# Patient Record
Sex: FEMALE | Race: BLACK OR AFRICAN AMERICAN | Employment: UNEMPLOYED | URBAN - METROPOLITAN AREA
[De-identification: names, ages, dates, MRNs, and addresses within clinical notes are randomized per-mention and may not be internally consistent; named-entity substitution may affect disease eponyms.]

---

## 2018-05-18 ENCOUNTER — APPOINTMENT (OUTPATIENT)
Dept: GENERAL RADIOLOGY | Age: 58
End: 2018-05-18
Attending: PHYSICIAN ASSISTANT
Payer: SELF-PAY

## 2018-05-18 ENCOUNTER — HOSPITAL ENCOUNTER (EMERGENCY)
Age: 58
Discharge: HOME | End: 2018-05-18
Attending: EMERGENCY MEDICINE | Admitting: EMERGENCY MEDICINE
Payer: SELF-PAY

## 2018-05-18 VITALS
SYSTOLIC BLOOD PRESSURE: 156 MMHG | HEART RATE: 87 BPM | TEMPERATURE: 98.2 F | OXYGEN SATURATION: 96 % | RESPIRATION RATE: 16 BRPM | BODY MASS INDEX: 26.21 KG/M2 | DIASTOLIC BLOOD PRESSURE: 92 MMHG | HEIGHT: 59 IN | WEIGHT: 130 LBS

## 2018-05-18 DIAGNOSIS — S82.832A OTHER CLOSED FRACTURE OF DISTAL END OF LEFT FIBULA, INITIAL ENCOUNTER: Primary | ICD-10-CM

## 2018-05-18 PROCEDURE — 99282 EMERGENCY DEPT VISIT SF MDM: CPT

## 2018-05-18 PROCEDURE — 73610 X-RAY EXAM OF ANKLE: CPT

## 2018-05-18 PROCEDURE — 75810000053 HC SPLINT APPLICATION

## 2018-05-18 RX ORDER — HYDROCODONE BITARTRATE AND ACETAMINOPHEN 5; 325 MG/1; MG/1
1 TABLET ORAL
Qty: 10 TAB | Refills: 0 | OUTPATIENT
Start: 2018-05-18 | End: 2022-04-29

## 2018-05-18 RX ORDER — IBUPROFEN 600 MG/1
600 TABLET ORAL
Qty: 20 TAB | Refills: 0 | OUTPATIENT
Start: 2018-05-18 | End: 2022-04-29

## 2018-05-18 NOTE — ED NOTES
Short leg cast applied and pt return demonstrate use of crutches. Pt accepted DC data and med's. Patient (s)  given copy of dc instructions and 2 script(s). Patient (s)  verbalized understanding of instructions and script (s). Patient given a current medication reconciliation form and verbalized understanding of their medications. Patient (s) verbalized understanding of the importance of discussing medications with  his or her physician or clinic they will be following up with. Patient alert and oriented and in no acute distress. Patient discharged home ambulatory with self.

## 2018-05-18 NOTE — ED NOTES
Patient alert and oriented x4. Patient reports left ankle pain x2 days after slipping on a wet piece of cardboard. Denies hitting head and denies LOC. Patient denies any other complaints at this time. Emergency Department Nursing Plan of Care       The Nursing Plan of Care is developed from the Nursing assessment and Emergency Department Attending provider initial evaluation. The plan of care may be reviewed in the ED Provider note.     The Plan of Care was developed with the following considerations:   Patient / Family readiness to learn indicated by:verbalized understanding  Persons(s) to be included in education: patient  Barriers to Learning/Limitations:No    [id number] Domingo Toribio RN    5/18/2018   2:01 PM

## 2018-05-18 NOTE — ED PROVIDER NOTES
EMERGENCY DEPARTMENT HISTORY AND PHYSICAL EXAM    Date: 5/18/2018  Patient Name: Scott Rodriguez    History of Presenting Illness     Chief Complaint   Patient presents with    Ankle Pain     left x 2 days after slipping on wet board         History Provided By: Patient    HPI: Scott Rodriguez is a 65 y.o. female with No significant past medical history who presents with L ankle pain x 2 days. Pt states while walking home she slipped on a wet board and twisted ankle. Pt states \"I think it's sprained\"  Pt states she took one of her mother's \"prescription tylenol\" pills PTA. Pt rates pain 7/10. Pt has been using crutches at home    PCP: No primary care provider on file. Current Outpatient Prescriptions   Medication Sig Dispense Refill    HYDROcodone-acetaminophen (NORCO) 5-325 mg per tablet Take 1 Tab by mouth every eight (8) hours as needed for Pain. Max Daily Amount: 3 Tabs. 10 Tab 0    ibuprofen (MOTRIN) 600 mg tablet Take 1 Tab by mouth every six (6) hours as needed for Pain. 20 Tab 0       Past History     Past Medical History:  History reviewed. No pertinent past medical history. Past Surgical History:  History reviewed. No pertinent surgical history. Family History:  History reviewed. No pertinent family history. Social History:  Social History   Substance Use Topics    Smoking status: Current Every Day Smoker     Packs/day: 1.00    Smokeless tobacco: None    Alcohol use Yes       Allergies:  No Known Allergies      Review of Systems   Review of Systems   Musculoskeletal: Positive for arthralgias, gait problem and joint swelling. Neurological: Negative for speech difficulty and weakness. Psychiatric/Behavioral: Positive for self-injury. All other systems reviewed and are negative.       Physical Exam     Vitals:    05/18/18 1328   BP: (!) 156/92   Pulse: 87   Resp: 16   Temp: 98.2 °F (36.8 °C)   SpO2: 96%   Weight: 59 kg (130 lb)   Height: 4' 11\" (1.499 m)     Physical Exam Constitutional: She is oriented to person, place, and time. She appears well-developed and well-nourished. No distress. HENT:   Head: Normocephalic and atraumatic. Eyes: Conjunctivae are normal.   Cardiovascular: Normal rate, regular rhythm and normal heart sounds. Pulmonary/Chest: Effort normal and breath sounds normal. No respiratory distress. She has no wheezes. She has no rales. Musculoskeletal:        Left ankle: She exhibits decreased range of motion and swelling. She exhibits no ecchymosis, no deformity, no laceration and normal pulse. Tenderness. Lateral malleolus and medial malleolus tenderness found. Achilles tendon normal.   Neurological: She is alert and oriented to person, place, and time. Skin: Skin is warm and dry. Psychiatric: She has a normal mood and affect. Her behavior is normal. Judgment and thought content normal.   Nursing note and vitals reviewed. at 3:20 PM    Diagnostic Study Results     Labs -   No results found for this or any previous visit (from the past 12 hour(s)). Radiologic Studies -   XR ANKLE LT MIN 3 V   Final Result        CT Results  (Last 48 hours)    None        CXR Results  (Last 48 hours)    None        Study Result      EXAM:  XR ANKLE LT MIN 3 V     INDICATION:  Fall 2 days ago. Left ankle pain     COMPARISON: None.     FINDINGS: Three views of the left ankle demonstrate a spiral fracture of the  distal fibula. There is soft tissue swelling.       IMPRESSION  IMPRESSION: Spiral fracture distal fibula. Medical Decision Making   I am the first provider for this patient. I reviewed the vital signs, available nursing notes, past medical history, past surgical history, family history and social history. Vital Signs-Reviewed the patient's vital signs. ED Course:   Definitive fracture care:  2:48 PM  Splint applied, +NVI, pt already has crutches , ortho follow up given.     Disposition:  Discharged    DISCHARGE NOTE:   3:20 PM      Care plan outlined and precautions discussed. Patient has no new complaints, changes, or physical findings. Results of xray were reviewed with the patient. All medications were reviewed with the patient; will d/c home. All of pt's questions and concerns were addressed. Patient was instructed and agrees to follow up with ortho, as well as to return to the ED upon further deterioration. Patient is ready to go home. Follow-up Information     Follow up With Details Comments [address] [place] Schedule an appointment as soon as possible for a visit today podiatrist- for f/u of fracture Meka Srinivasan [facility] [address]  [phone]            Current Discharge Medication List      START taking these medications    Details   HYDROcodone-acetaminophen (Gonsales Raul) 5-325 mg per tablet Take 1 Tab by mouth every eight (8) hours as needed for Pain. Max Daily Amount: 3 Tabs. Qty: 10 Tab, Refills: 0    Associated Diagnoses: Other closed fracture of distal end of left fibula, initial encounter      ibuprofen (MOTRIN) 600 mg tablet Take 1 Tab by mouth every six (6) hours as needed for Pain. Qty: 20 Tab, Refills: 0             Provider Notes (Medical Decision Making):   DDX: strain, sprain, fracture    Procedures:  APPLY SHORT LEG SPLINT  Date/Time: 5/18/2018 3:19 PM  Performed by: Юлия Arteaga  Authorized by: Юлия Arteaga     Consent:     Consent obtained:  Verbal    Consent given by:  Parent    Risks discussed:  Pain and swelling  Pre-procedure details:     Sensation:  Normal  Procedure details:     Laterality:  Left    Location:  Ankle    Ankle:  L ankle    Cast type:  Short leg    Splint type:  Short leg    Supplies:  Elastic bandage, cotton padding and Ortho-Glass  Post-procedure details:     Pain:  Unchanged    Sensation:  Normal    Patient tolerance of procedure:   Tolerated well, no immediate complications  Comments:      Placed by PA student and tech        Diagnosis     Clinical Impression:   1.  Other closed fracture of distal end of left fibula, initial encounter

## 2018-05-18 NOTE — DISCHARGE INSTRUCTIONS
Learning About RICE (Rest, Ice, Compression, and Elevation)  What is RICE? RICE is a way to care for an injury. RICE helps relieve pain and swelling. It may also help with healing and flexibility. RICE stands for:  · Rest and protect the injured or sore area. · Ice or a cold pack used as soon as possible. · Compression, or wrapping the injured or sore area with an elastic bandage. · Elevation (propping up) the injured or sore area. How do you do RICE? You can use RICE for home treatment when you have general aches and pains or after an injury or surgery. Rest  · Do not put weight on the injury for at least 24 to 48 hours. · Use crutches for a badly sprained knee or ankle. · Support a sprained wrist, elbow, or shoulder with a sling. Ice  · Put ice or a cold pack on the injury right away to reduce pain and swelling. Frozen vegetables will also work as an ice pack. Put a thin cloth between the ice or cold pack and your skin. The cloth protects the injured area from getting too cold. · Use ice for 10 to 15 minutes at a time for the first 48 to 72 hours. Compression  · Use compression for sprains, strains, and surgeries of the arms and legs. · Wrap the injured area with an elastic bandage or compression sleeve to reduce swelling. · Don't wrap it too tightly. If the area below it feels numb, tingles, or feels cool, loosen the wrap. Elevation  · Use elevation for areas of the body that can be propped up, such as arms and legs. · Prop up the injured area on pillows whenever you use ice. Keep it propped up anytime you sit or lie down. · Try to keep the injured area at or above the level of your heart. This will help reduce swelling and bruising. Where can you learn more? Go to http://cezar-jourdan.info/. Enter B758 in the search box to learn more about \"Learning About RICE (Rest, Ice, Compression, and Elevation). \"  Current as of: March 21, 2017  Content Version: 11.4  © 4201-0150 Healthwise, Incorporated. Care instructions adapted under license by [employer] (which disclaims liability or warranty for this information). If you have questions about a medical condition or this instruction, always ask your healthcare professional. [address] any warranty or liability for your use of this information. Broken Ankle: Care Instructions  Your Care Instructions    An ankle may break (fracture) during sports, a fall, or other accidents. Fractures can range from a small, hairline crack, to a bone or bones broken into two or more pieces. Your treatment depends on how bad the break is. Your doctor may have put your ankle in a splint or cast to allow it to heal or to keep it stable until you see another doctor. It may take weeks or months for your ankle to heal. You can help your ankle heal with some care at home. You heal best when you take good care of yourself. Eat a variety of healthy foods, and don't smoke. You may have had a sedative to help you relax. You may be unsteady after having sedation. It can take a few hours for the medicine's effects to wear off. Common side effects of sedation include nausea, vomiting, and feeling sleepy or tired. The doctor has checked you carefully, but problems can develop later. If you notice any problems or new symptoms,  get medical treatment right away. Follow-up care is a key part of your treatment and safety. Be sure to make and go to all appointments, and call your doctor if you are having problems. It's also a good idea to know your test results and keep a list of the medicines you take. How can you care for yourself at home? · If the doctor gave you a sedative:  ¨ For 24 hours, don't do anything that requires attention to detail. It takes time for the medicine's effects to completely wear off. ¨ For your safety, do not drive or operate any machinery that could be dangerous.  Wait until the medicine wears off and you can think clearly and react easily. · Put ice or a cold pack on your ankle for 10 to 20 minutes at a time. Try to do this every 1 to 2 hours for the next 3 days (when you are awake). Put a thin cloth between the ice and your cast or splint. Keep your cast or splint dry. · Follow the cast care instructions your doctor gives you. If you have a splint, do not take it off unless your doctor tells you to. · Be safe with medicines. Take pain medicines exactly as directed. ¨ If the doctor gave you a prescription medicine for pain, take it as prescribed. ¨ If you are not taking a prescription pain medicine, ask your doctor if you can take an over-the-counter medicine. · Prop up your leg on pillows in the first few days after the injury. Keep the ankle higher than the level of your heart. This will help reduce swelling. · Do not put weight on your ankle unless your doctor tells you to. Use crutches to walk. · Follow instructions for exercises to keep your leg strong. · Wiggle your toes often to reduce swelling and stiffness. When should you call for help? Call 911 anytime you think you may need emergency care. For example, call if:  ? · You have chest pain, are short of breath, or you cough up blood. ? · You are very sleepy and you have trouble waking up. ?Call your doctor now or seek immediate medical care if:  ? · You have new or worse nausea or vomiting. ? · You have new or worse pain. ? · Your foot is cool or pale or changes color. ? · You have tingling, weakness, or numbness in your toes. ? · Your cast or splint feels too tight. ? · You have signs of a blood clot in your leg (called a deep vein thrombosis), such as:  ¨ Pain in your calf, back of the knee, thigh, or groin. ¨ Redness or swelling in your leg. ? Watch closely for changes in your health, and be sure to contact your doctor if:  ? · You have a problem with your splint or cast.   ? · You do not get better as expected.    Where can you learn more? Go to http://cezar-jourdan.info/. Enter P763 in the search box to learn more about \"Broken Ankle: Care Instructions. \"  Current as of: March 21, 2017  Content Version: 11.4  © 8800-5472 Healthwise, Incorporated. Care instructions adapted under license by [employer] (which disclaims liability or warranty for this information). If you have questions about a medical condition or this instruction, always ask your healthcare professional. [place] [address] any warranty or liability for your use of this information.

## 2021-10-28 ENCOUNTER — HOSPITAL ENCOUNTER (EMERGENCY)
Age: 61
Discharge: HOME | End: 2021-10-28
Attending: EMERGENCY MEDICINE
Payer: MEDICAID

## 2021-10-28 ENCOUNTER — APPOINTMENT (OUTPATIENT)
Dept: GENERAL RADIOLOGY | Age: 61
End: 2021-10-28
Attending: EMERGENCY MEDICINE
Payer: MEDICAID

## 2021-10-28 VITALS
OXYGEN SATURATION: 98 % | SYSTOLIC BLOOD PRESSURE: 156 MMHG | HEART RATE: 80 BPM | RESPIRATION RATE: 16 BRPM | BODY MASS INDEX: 25.8 KG/M2 | WEIGHT: 128 LBS | TEMPERATURE: 98.1 F | DIASTOLIC BLOOD PRESSURE: 96 MMHG | HEIGHT: 59 IN

## 2021-10-28 DIAGNOSIS — Z72.0 TOBACCO USE: ICD-10-CM

## 2021-10-28 DIAGNOSIS — I10 ASYMPTOMATIC HYPERTENSION: Primary | ICD-10-CM

## 2021-10-28 DIAGNOSIS — R05.9 COUGH: ICD-10-CM

## 2021-10-28 PROCEDURE — 99284 EMERGENCY DEPT VISIT MOD MDM: CPT

## 2021-10-28 PROCEDURE — 93005 ELECTROCARDIOGRAM TRACING: CPT

## 2021-10-28 PROCEDURE — 71045 X-RAY EXAM CHEST 1 VIEW: CPT

## 2021-10-28 NOTE — ED PROVIDER NOTES
EMERGENCY DEPARTMENT HISTORY AND PHYSICAL EXAM      Date: 10/28/2021  Patient Name: Abbey Landa    History of Presenting Illness     Chief Complaint   Patient presents with    Hypertension    Cough       History Provided By: Patient    HPI: Abbey Landa, 62 y.o. female presents to the ED with cc of hypertension. 11year-old female with history of tobacco use presents emergency department with a chief complaint of high blood pressure. Patient went to donate plasma and was told her blood pressure was high in the 613L systolic. She denies any history of hypertension. She has not seen a PCP as she \"stays away from doctors. \". Patient presents emergency department due to concern for high blood pressure. Patient denies chest pain, shortness of breath, numbness or weakness, headache, dizziness, abdominal pain, vomiting or diarrhea. Patient does report a dry nonproductive cough x1 month. There are no other complaints, changes, or physical findings at this time. PCP: Pk, MD Yeyo    No current facility-administered medications on file prior to encounter. Current Outpatient Medications on File Prior to Encounter   Medication Sig Dispense Refill    HYDROcodone-acetaminophen (NORCO) 5-325 mg per tablet Take 1 Tab by mouth every eight (8) hours as needed for Pain. Max Daily Amount: 3 Tabs. (Patient not taking: Reported on 10/28/2021) 10 Tab 0    ibuprofen (MOTRIN) 600 mg tablet Take 1 Tab by mouth every six (6) hours as needed for Pain. (Patient not taking: Reported on 10/28/2021) 20 Tab 0       Past History     Past Medical History:  History reviewed. No pertinent past medical history. Past Surgical History:  History reviewed. No pertinent surgical history. Family History:  History reviewed. No pertinent family history.     Social History:  Social History     Tobacco Use    Smoking status: Current Every Day Smoker     Packs/day: 1.00    Smokeless tobacco: Never Used   Substance Use Topics  Alcohol use: Yes    Drug use: Yes     Types: Cocaine     Comment: last use 10/26/2021       Allergies:  No Known Allergies      Review of Systems   Review of Systems   Constitutional: Negative for activity change, chills and fever. HENT: Negative for facial swelling and voice change. Eyes: Negative for redness. Respiratory: Positive for cough. Negative for shortness of breath and wheezing. Cardiovascular: Negative for chest pain and leg swelling. Gastrointestinal: Negative for abdominal pain, diarrhea, nausea and vomiting. Genitourinary: Negative for decreased urine volume. Musculoskeletal: Negative for gait problem. Skin: Negative for pallor and rash. Neurological: Negative for tremors and facial asymmetry. Psychiatric/Behavioral: Negative for agitation. All other systems reviewed and are negative. Physical Exam   Physical Exam  Vitals and nursing note reviewed. Constitutional:       Comments: 11year-old female, sitting on stretcher, no acute distress   HENT:      Head: Normocephalic and atraumatic. Cardiovascular:      Rate and Rhythm: Normal rate and regular rhythm. Heart sounds: No murmur heard. No friction rub. No gallop. Pulmonary:      Effort: Pulmonary effort is normal.      Breath sounds: Normal breath sounds. No wheezing, rhonchi or rales. Comments: Not hypoxic on room air, no respiratory distress  Abdominal:      Palpations: Abdomen is soft. Tenderness: There is no abdominal tenderness. Musculoskeletal:         General: No swelling. Normal range of motion. Cervical back: Normal range of motion. Skin:     General: Skin is warm. Capillary Refill: Capillary refill takes less than 2 seconds. Neurological:      General: No focal deficit present. Mental Status: She is alert.    Psychiatric:         Mood and Affect: Mood normal.         Diagnostic Study Results     Labs -     Recent Results (from the past 12 hour(s))   EKG, 12 LEAD, INITIAL    Collection Time: 10/28/21 10:48 AM   Result Value Ref Range    Ventricular Rate 79 BPM    Atrial Rate 79 BPM    P-R Interval 148 ms    QRS Duration 74 ms    Q-T Interval 386 ms    QTC Calculation (Bezet) 442 ms    Calculated P Axis 46 degrees    Calculated R Axis 5 degrees    Calculated T Axis 52 degrees    Diagnosis       Normal sinus rhythm  Possible Left atrial enlargement  Borderline ECG  No previous ECGs available         Radiologic Studies -   XR CHEST PORT   Final Result   No acute process. CT Results  (Last 48 hours)    None        CXR Results  (Last 48 hours)               10/28/21 1041  XR CHEST PORT Final result    Impression:  No acute process. Narrative: Indication: Cough       Comparison: None       Portable exam of the chest obtained at 1036 demonstrates normal heart size. There is no acute process in the lung fields. The osseous structures are   unremarkable. Medical Decision Making   I am the first provider for this patient. I reviewed the vital signs, available nursing notes, past medical history, past surgical history, family history and social history. Vital Signs-Reviewed the patient's vital signs. Patient Vitals for the past 12 hrs:   Temp Pulse Resp BP SpO2   10/28/21 1110 -- -- -- (!) 147/90 97 %   10/28/21 1100 -- -- -- (!) 167/87 97 %   10/28/21 1037 -- -- -- (!) 126/102 --   10/28/21 1025 98.2 °F (36.8 °C) 88 16 (!) 155/101 97 %     Records Reviewed: Nursing Notes and Old Medical Records    Provider Notes (Medical Decision Making):     81-year-old female presents emergency department chief complaint of elevated blood pressure. Vital signs stable, not hypoxic. Blood pressure is mildly elevated here but not at a level to make me suspect hypertensive urgency or emergency. Suspect asymptomatic hypertension. Check EKG. Guarding patient's cough, likely smoker's cough, check plain film to exclude pneumothorax, pneumonia.   No wheezing on exam.  Doubt PE. Discussed with patient, likely asymptomatic hypertension, recommend lifestyle trial such as quitting smoking. Recommend PCP follow-up to discuss initiation of antihypertensives. ED Course:   Initial assessment performed. The patients presenting problems have been discussed, and they are in agreement with the care plan formulated and outlined with them. I have encouraged them to ask questions as they arise throughout their visit. ED Course as of Oct 28 1119   Thu Oct 28, 2021   1048 Preliminary EKG interpreted by me. Shows normal sinus rhythm with a HR of 79. No ST elevations or depressions concerning for ischemia. Normal intervals. [MB]   1104 Chest x-ray is unremarkable. [MB]   1651 Patient reassessed, resting in bed, no distress. Discussed PCP follow-up and return precautions. [MB]      ED Course User Index  [MB] Racheal Suarez MD         PROCEDURES      The patient was counseled on the dangers of tobacco use, and was advised to quit. Reviewed strategies to maximize success, including substitution of other forms of reinforcement. 3 minutes spent. Radha Dillon MD      Disposition:    Discharged    DISCHARGE PLAN:  1. Current Discharge Medication List        2. Follow-up Information     Follow up With Specialties Details Why Contact Info    [facility] EMERGENCY DEPT Emergency Medicine  If symptoms worsen [address]    [facility] [address] [place]  In 1 week  [facility] [address] [facility]  In 1 week  [address]  [address] [facility]  [phone]        3. Return to ED if worse     Diagnosis     Clinical Impression:   1. Asymptomatic hypertension    2. Cough    3. Tobacco use        Attestations:    Radha Dillon MD    Please note that this dictation was completed with [email or web address], the [employer] voice recognition software.   Quite often unanticipated grammatical, syntax, homophones, and other interpretive errors are inadvertently transcribed by the computer software. Please disregard these errors. Please excuse any errors that have escaped final proofreading. Thank you.

## 2021-10-28 NOTE — ED NOTES
Pt presents to ED with c/o high blood pressure x 1 week. Pt reports she was trying to donate blood but was unable to do so because they told her that her BP was too high. Per pt, she does not have a PMH of HTN nor is she on medication. Pt also reporting a chronic, dry cough but patient is a daily smoker. Additionally, pt reports occasional crack/cocaine use with her last use being 10/26/2021. RR even and unlabored, skin is warm and dry. Resting on stretcher in NAD. A/O x 4. Call bell within reach. Emergency Department Nursing Plan of Care       The Nursing Plan of Care is developed from the Nursing assessment and Emergency Department Attending provider initial evaluation. The plan of care may be reviewed in the ED Provider note.     The Plan of Care was developed with the following considerations:   Patient / Family readiness to learn indicated by:verbalized understanding  Persons(s) to be included in education: patient  Barriers to Learning/Limitations:No    Signed     Roxanne Corbett RN    10/28/2021   10:55 AM

## 2021-10-28 NOTE — ED NOTES
Pt appears stable, VSS, AOx4, calm and cooperative. Pt discharged home by self. Instructions to follow up with PCP by 11/04/2021 given to patient. Pt ambulatory w steady gait.

## 2021-10-28 NOTE — DISCHARGE INSTRUCTIONS
You were seen in the ER for your symptoms. Please try to cut back and quit smoking. Please monitor your blood pressure and follow-up with your primary doctors.

## 2021-10-28 NOTE — ED TRIAGE NOTES
Pt arrives in the ED with complaints of hypertension x few weeks. Pt denies symptoms related to this. Pt states that when she went to donate blood she was told that her pressure was too high. Pt also reports dry cough x 1 month.

## 2021-10-29 LAB
ATRIAL RATE: 79 BPM
CALCULATED P AXIS, ECG09: 46 DEGREES
CALCULATED R AXIS, ECG10: 5 DEGREES
CALCULATED T AXIS, ECG11: 52 DEGREES
DIAGNOSIS, 93000: NORMAL
P-R INTERVAL, ECG05: 148 MS
Q-T INTERVAL, ECG07: 386 MS
QRS DURATION, ECG06: 74 MS
QTC CALCULATION (BEZET), ECG08: 442 MS
VENTRICULAR RATE, ECG03: 79 BPM

## 2022-04-29 ENCOUNTER — APPOINTMENT (OUTPATIENT)
Dept: VASCULAR SURGERY | Age: 62
End: 2022-04-29
Attending: NURSE PRACTITIONER
Payer: MEDICAID

## 2022-04-29 ENCOUNTER — HOSPITAL ENCOUNTER (EMERGENCY)
Age: 62
Discharge: HOME | End: 2022-04-29
Attending: EMERGENCY MEDICINE
Payer: MEDICAID

## 2022-04-29 ENCOUNTER — APPOINTMENT (OUTPATIENT)
Dept: GENERAL RADIOLOGY | Age: 62
End: 2022-04-29
Attending: NURSE PRACTITIONER
Payer: MEDICAID

## 2022-04-29 VITALS
HEIGHT: 59 IN | BODY MASS INDEX: 26.21 KG/M2 | TEMPERATURE: 99.1 F | DIASTOLIC BLOOD PRESSURE: 95 MMHG | RESPIRATION RATE: 19 BRPM | SYSTOLIC BLOOD PRESSURE: 184 MMHG | WEIGHT: 130 LBS | OXYGEN SATURATION: 96 % | HEART RATE: 96 BPM

## 2022-04-29 DIAGNOSIS — R03.0 ELEVATED BLOOD PRESSURE READING: ICD-10-CM

## 2022-04-29 DIAGNOSIS — J42 CHRONIC BRONCHITIS, UNSPECIFIED CHRONIC BRONCHITIS TYPE (HCC): Primary | ICD-10-CM

## 2022-04-29 LAB
ALBUMIN SERPL-MCNC: 3.7 G/DL (ref 3.5–5)
ALBUMIN/GLOB SERPL: 0.9 {RATIO} (ref 1.1–2.2)
ALP SERPL-CCNC: 97 U/L (ref 45–117)
ALT SERPL-CCNC: 21 U/L (ref 12–78)
ANION GAP SERPL CALC-SCNC: 9 MMOL/L (ref 5–15)
AST SERPL-CCNC: 19 U/L (ref 15–37)
BASOPHILS # BLD: 0.1 K/UL (ref 0–0.1)
BASOPHILS NFR BLD: 1 % (ref 0–1)
BILIRUB SERPL-MCNC: 0.1 MG/DL (ref 0.2–1)
BNP SERPL-MCNC: 103 PG/ML (ref 0–125)
BUN SERPL-MCNC: 14 MG/DL (ref 6–20)
BUN/CREAT SERPL: 14 (ref 12–20)
CALCIUM SERPL-MCNC: 9.1 MG/DL (ref 8.5–10.1)
CHLORIDE SERPL-SCNC: 107 MMOL/L (ref 97–108)
CO2 SERPL-SCNC: 25 MMOL/L (ref 21–32)
CREAT SERPL-MCNC: 0.97 MG/DL (ref 0.55–1.02)
DIFFERENTIAL METHOD BLD: ABNORMAL
EOSINOPHIL # BLD: 0.1 K/UL (ref 0–0.4)
EOSINOPHIL NFR BLD: 1 % (ref 0–7)
ERYTHROCYTE [DISTWIDTH] IN BLOOD BY AUTOMATED COUNT: 15.3 % (ref 11.5–14.5)
GLOBULIN SER CALC-MCNC: 4 G/DL (ref 2–4)
GLUCOSE SERPL-MCNC: 102 MG/DL (ref 65–100)
HCT VFR BLD AUTO: 43.6 % (ref 35–47)
HGB BLD-MCNC: 14.4 G/DL (ref 11.5–16)
IMM GRANULOCYTES # BLD AUTO: 0 K/UL (ref 0–0.04)
IMM GRANULOCYTES NFR BLD AUTO: 0 % (ref 0–0.5)
LYMPHOCYTES # BLD: 2.2 K/UL (ref 0.8–3.5)
LYMPHOCYTES NFR BLD: 29 % (ref 12–49)
MCH RBC QN AUTO: 30 PG (ref 26–34)
MCHC RBC AUTO-ENTMCNC: 33 G/DL (ref 30–36.5)
MCV RBC AUTO: 90.8 FL (ref 80–99)
MONOCYTES # BLD: 0.5 K/UL (ref 0–1)
MONOCYTES NFR BLD: 7 % (ref 5–13)
NEUTS SEG # BLD: 4.7 K/UL (ref 1.8–8)
NEUTS SEG NFR BLD: 62 % (ref 32–75)
NRBC # BLD: 0 K/UL (ref 0–0.01)
NRBC BLD-RTO: 0 PER 100 WBC
PLATELET # BLD AUTO: 202 K/UL (ref 150–400)
PMV BLD AUTO: 11.1 FL (ref 8.9–12.9)
POTASSIUM SERPL-SCNC: 4.1 MMOL/L (ref 3.5–5.1)
PROT SERPL-MCNC: 7.7 G/DL (ref 6.4–8.2)
RBC # BLD AUTO: 4.8 M/UL (ref 3.8–5.2)
SODIUM SERPL-SCNC: 141 MMOL/L (ref 136–145)
WBC # BLD AUTO: 7.6 K/UL (ref 3.6–11)

## 2022-04-29 PROCEDURE — 77030025612 HC NEB KT VYRM -A

## 2022-04-29 PROCEDURE — 74011000250 HC RX REV CODE- 250: Performed by: NURSE PRACTITIONER

## 2022-04-29 PROCEDURE — 83880 ASSAY OF NATRIURETIC PEPTIDE: CPT

## 2022-04-29 PROCEDURE — 93970 EXTREMITY STUDY: CPT

## 2022-04-29 PROCEDURE — 85025 COMPLETE CBC W/AUTO DIFF WBC: CPT

## 2022-04-29 PROCEDURE — 74011636637 HC RX REV CODE- 636/637: Performed by: NURSE PRACTITIONER

## 2022-04-29 PROCEDURE — 80053 COMPREHEN METABOLIC PANEL: CPT

## 2022-04-29 PROCEDURE — 71045 X-RAY EXAM CHEST 1 VIEW: CPT

## 2022-04-29 PROCEDURE — 94640 AIRWAY INHALATION TREATMENT: CPT

## 2022-04-29 PROCEDURE — 36415 COLL VENOUS BLD VENIPUNCTURE: CPT

## 2022-04-29 PROCEDURE — 99284 EMERGENCY DEPT VISIT MOD MDM: CPT

## 2022-04-29 RX ORDER — IPRATROPIUM BROMIDE AND ALBUTEROL SULFATE 2.5; .5 MG/3ML; MG/3ML
3 SOLUTION RESPIRATORY (INHALATION)
Status: COMPLETED | OUTPATIENT
Start: 2022-04-29 | End: 2022-04-29

## 2022-04-29 RX ORDER — PREDNISONE 20 MG/1
60 TABLET ORAL DAILY
Qty: 15 TABLET | Refills: 0 | Status: SHIPPED | OUTPATIENT
Start: 2022-04-29 | End: 2022-05-04

## 2022-04-29 RX ORDER — PREDNISONE 20 MG/1
60 TABLET ORAL
Status: COMPLETED | OUTPATIENT
Start: 2022-04-29 | End: 2022-04-29

## 2022-04-29 RX ORDER — AZITHROMYCIN 250 MG/1
TABLET, FILM COATED ORAL
Qty: 6 TABLET | Refills: 0 | Status: SHIPPED | OUTPATIENT
Start: 2022-04-29

## 2022-04-29 RX ORDER — FLUTICASONE PROPIONATE AND SALMETEROL 250; 50 UG/1; UG/1
1 POWDER RESPIRATORY (INHALATION) EVERY 12 HOURS
Qty: 1 EACH | Refills: 0 | Status: SHIPPED | OUTPATIENT
Start: 2022-04-29

## 2022-04-29 RX ORDER — PREDNISONE 20 MG/1
60 TABLET ORAL ONCE
Status: DISCONTINUED | OUTPATIENT
Start: 2022-04-29 | End: 2022-04-29

## 2022-04-29 RX ORDER — ALBUTEROL SULFATE 90 UG/1
2 AEROSOL, METERED RESPIRATORY (INHALATION)
Qty: 18 G | Refills: 0 | Status: SHIPPED | OUTPATIENT
Start: 2022-04-29

## 2022-04-29 RX ADMIN — IPRATROPIUM BROMIDE AND ALBUTEROL SULFATE 3 ML: .5; 2.5 SOLUTION RESPIRATORY (INHALATION) at 15:40

## 2022-04-29 RX ADMIN — PREDNISONE 60 MG: 20 TABLET ORAL at 15:40

## 2022-04-29 NOTE — PROGRESS NOTES
CM opened case for assessment of D/C planning needs, CM reviewed chart. CM reviewed chart. CM completed assessment with pt at bedside. Pt is alert and oriented throughout encounter. CM introduced self/role, verified demographics, and discussed discharge planning. CM spoke with patient in therm of multiple ER visits she states she has a PCP at [facility] but don't like [facility] so she comes here cause she lives in [place]. Discuss follow-up with patient okay to have PCP scheduled.      [address] [facility], [address] [phone]

## 2022-04-29 NOTE — ED NOTES
Pt reports bilat calf pain x 1 year and dry, non productive cough that is worse at night x 5-6 months. Pt is alert and oriented x 4, RR even and unlabored, skin is warm and dry. Assessment completed and pt updated on plan of care. Call bell in reach. Emergency Department Nursing Plan of Care       The Nursing Plan of Care is developed from the Nursing assessment and Emergency Department Attending provider initial evaluation. The plan of care may be reviewed in the ED Provider note.     The Plan of Care was developed with the following considerations:   Patient / Family readiness to learn indicated by:verbalized understanding  Persons(s) to be included in education: patient  Barriers to Learning/Limitations:No    Signed     Bart Tatum RN    4/29/2022   11:45 AM

## 2022-04-29 NOTE — ED PROVIDER NOTES
EMERGENCY DEPARTMENT HISTORY AND PHYSICAL EXAM      Date: 4/29/2022  Patient Name: Osei Salinas    History of Presenting Illness     Chief Complaint   Patient presents with    Cough     non productive cough x 5-6 months, denies fever, chills and body aches    Leg Pain     \"I walked two blocks and my legs are aching like crazy. \"        History Provided By: Patient    Additional History (Context): Osei Salinas is a 62 y.o. female with substance use  who presents with cough and leg pain. Patient states cough present for approximately a year. Patient states symptoms are intermittent and is a dry cough. Associated with shortness of breath and wheezing. Denies fever, chills, chest pain, nasal symptoms, sore throat. Patient states she has been told she had bronchitis or asthma in the past and has an albuterol inhaler. States inhaler helps at times. He also reports bilateral leg pain that aches more after walking 2 blocks. Denies swelling, redness, warmth, bruising to legs. Denies history of DVT, PE, history of immobilizations or surgeries. She reports history of EtOH and substance use. Reports last crack cocaine use 2 days ago. Denies history of heart disease or CHF. PCP: Yeyo Whittington MD    Current Outpatient Medications   Medication Sig Dispense Refill    predniSONE (DELTASONE) 20 mg tablet Take 60 mg by mouth daily for 5 days. With Breakfast 15 Tablet 0    albuterol (PROVENTIL HFA, VENTOLIN HFA, PROAIR HFA) 90 mcg/actuation inhaler Take 2 Puffs by inhalation every four (4) hours as needed for Wheezing. 18 g 0    fluticasone propion-salmeteroL (Advair Diskus) 250-50 mcg/dose diskus inhaler Take 1 Puff by inhalation every twelve (12) hours.  1 Each 0    azithromycin (Zithromax Z-Tejinder) 250 mg tablet Take 2 tabs by mouth once and then repeat daily for 4 days 6 Tablet 0       Past History     Past Medical History:  Past Medical History:   Diagnosis Date    Drug abuse ([facility] [address]     states, \"I do crack every once in a while\"       Past Surgical History:  No past surgical history on file. Family History:  No family history on file. Social History:  Social History     Tobacco Use    Smoking status: Current Every Day Smoker     Packs/day: 1.00    Smokeless tobacco: Never Used   Substance Use Topics    Alcohol use: Yes    Drug use: Yes     Types: Cocaine     Comment: last use 10/26/2021       Allergies:  No Known Allergies      Review of Systems   Review of Systems   Constitutional: Negative for appetite change, chills, fatigue and fever. HENT: Negative for congestion, ear pain, rhinorrhea and sore throat. Eyes: Negative for pain and itching. Respiratory: Positive for cough, shortness of breath and wheezing. Negative for chest tightness. Cardiovascular: Negative for chest pain, palpitations and leg swelling. Gastrointestinal: Negative for abdominal pain, constipation, diarrhea, nausea and vomiting. Genitourinary: Negative for dysuria, frequency and urgency. Musculoskeletal: Positive for arthralgias (leg pain). Negative for back pain, gait problem and joint swelling. Skin: Negative for color change and rash. Neurological: Negative for dizziness, weakness, numbness and headaches. All other systems reviewed and are negative. Physical Exam     Vitals:    04/29/22 1430 04/29/22 1506 04/29/22 1539   BP: (!) 184/95     Pulse: 96     Resp: 19     Temp: 99.1 °F (37.3 °C)     SpO2: 98% 98% 96%   Weight: 59 kg (130 lb)     Height: 4' 11\" (1.499 m)       Physical Exam  Vitals and nursing note reviewed. Constitutional:       General: She is not in acute distress. Appearance: She is well-developed. She is not ill-appearing. HENT:      Head: Normocephalic and atraumatic.       Right Ear: Tympanic membrane and ear canal normal.      Left Ear: Tympanic membrane and ear canal normal.      Nose: Nose normal.      Mouth/Throat:      Mouth: Mucous membranes are moist.      Pharynx: Oropharynx is clear. No oropharyngeal exudate or posterior oropharyngeal erythema. Eyes:      Extraocular Movements: Extraocular movements intact. Conjunctiva/sclera: Conjunctivae normal.      Pupils: Pupils are equal, round, and reactive to light. Cardiovascular:      Rate and Rhythm: Normal rate and regular rhythm. Pulses: Normal pulses. Heart sounds: Normal heart sounds. Pulmonary:      Effort: Pulmonary effort is normal.      Breath sounds: Normal breath sounds. Abdominal:      General: Bowel sounds are normal. There is no distension. Palpations: Abdomen is soft. Tenderness: There is no abdominal tenderness. There is no guarding or rebound. Musculoskeletal:      Cervical back: Normal range of motion and neck supple. Comments:   BLE calf tenderness. No rubor on elevation. Skin texture intact. Pedal pulses 2+ bilaterally. No swelling, erythema or bruising. Skin:     General: Skin is warm and dry. Capillary Refill: Capillary refill takes less than 2 seconds. Neurological:      Mental Status: She is alert and oriented to person, place, and time. Diagnostic Study Results     Labs -     Recent Results (from the past 12 hour(s))   CBC WITH AUTOMATED DIFF    Collection Time: 04/29/22  3:13 PM   Result Value Ref Range    WBC 7.6 3.6 - 11.0 K/uL    RBC 4.80 3.80 - 5.20 M/uL    HGB 14.4 11.5 - 16.0 g/dL    HCT 43.6 35.0 - 47.0 %    MCV 90.8 80.0 - 99.0 FL    MCH 30.0 26.0 - 34.0 PG    MCHC 33.0 30.0 - 36.5 g/dL    RDW 15.3 (H) 11.5 - 14.5 %    PLATELET 829 131 - 765 K/uL    MPV 11.1 8.9 - 12.9 FL    NRBC 0.0 0  WBC    ABSOLUTE NRBC 0.00 0.00 - 0.01 K/uL    NEUTROPHILS 62 32 - 75 %    LYMPHOCYTES 29 12 - 49 %    MONOCYTES 7 5 - 13 %    EOSINOPHILS 1 0 - 7 %    BASOPHILS 1 0 - 1 %    IMMATURE GRANULOCYTES 0 0.0 - 0.5 %    ABS. NEUTROPHILS 4.7 1.8 - 8.0 K/UL    ABS. LYMPHOCYTES 2.2 0.8 - 3.5 K/UL    ABS. MONOCYTES 0.5 0.0 - 1.0 K/UL    ABS.  EOSINOPHILS 0.1 0.0 - 0.4 K/UL ABS. BASOPHILS 0.1 0.0 - 0.1 K/UL    ABS. IMM. GRANS. 0.0 0.00 - 0.04 K/UL    DF AUTOMATED     METABOLIC PANEL, COMPREHENSIVE    Collection Time: 04/29/22  3:13 PM   Result Value Ref Range    Sodium 141 136 - 145 mmol/L    Potassium 4.1 3.5 - 5.1 mmol/L    Chloride 107 97 - 108 mmol/L    CO2 25 21 - 32 mmol/L    Anion gap 9 5 - 15 mmol/L    Glucose 102 (H) 65 - 100 mg/dL    BUN 14 6 - 20 MG/DL    Creatinine 0.97 0.55 - 1.02 MG/DL    BUN/Creatinine ratio 14 12 - 20      GFR est AA >60 >60 ml/min/1.73m2    GFR est non-AA 59 (L) >60 ml/min/1.73m2    Calcium 9.1 8.5 - 10.1 MG/DL    Bilirubin, total 0.1 (L) 0.2 - 1.0 MG/DL    ALT (SGPT) 21 12 - 78 U/L    AST (SGOT) 19 15 - 37 U/L    Alk. phosphatase 97 45 - 117 U/L    Protein, total 7.7 6.4 - 8.2 g/dL    Albumin 3.7 3.5 - 5.0 g/dL    Globulin 4.0 2.0 - 4.0 g/dL    A-G Ratio 0.9 (L) 1.1 - 2.2     NT-PRO BNP    Collection Time: 04/29/22  3:13 PM   Result Value Ref Range    NT pro- 0 - 125 PG/ML       Radiologic Studies -   XR CHEST PORT   Final Result   Mild interstitial coarsening is nonspecific and can be seen in asthma,   bronchitis, atypical or viral pneumonia            DUPLEX LOWER EXT VENOUS BILAT    (Results Pending)     CT Results  (Last 48 hours)    None        CXR Results  (Last 48 hours)               04/29/22 1521  XR CHEST PORT Final result    Impression:  Mild interstitial coarsening is nonspecific and can be seen in asthma,   bronchitis, atypical or viral pneumonia               Narrative:  EXAM: XR CHEST PORT       INDICATION: c/o cough r/o pneumonia       COMPARISON: 10.28.21       FINDINGS: A portable AP radiograph of the chest was obtained at 1512 hours. There is mild new coarsening of interstitial markings but no infiltrate. The   cardiac and mediastinal contours are stable. The bones and soft tissues are   grossly within normal limits. Medical Decision Making   I am the first provider for this patient.     I reviewed the vital signs, available nursing notes, past medical history, past surgical history, family history and social history. Vital Signs-Reviewed the patient's vital signs. Records Reviewed: Nursing Notes and Old Medical Records    Provider Notes (Medical Decision Making):   61 yo F present with cough and leg pain exhibiting clear breath sounds bilaterally but observed persistent non productive cough. Pt has low grade temp on arrival with elevated blood pressure. Pt does not have appropriate follow up with PCP. Due to uncontrolled HTN and chronic cough plan to obtain labs and CXR to r/o PNA, pleural effusion, CHF, pulmonary edema, Asthma, bronchitis, COPD    Pt has bi;lateral calf tenderness upon palpation but not significant on dorsiflexion. Plan to obtain doppler to r/o DVT. Differential diagnosis include PVD, PAD       ED Course:   ED Course as of 04/29/22 1744 Fri Apr 29, 2022   1531 Progress Note:   Pt has persistent cough while in room. Plan to give duoneb and prednisone. CXR shows interstitial marking consistent with asthma, bronchitis or PNA  [NA]   1742 Progress Note:     Informed by vascular tech, Thomas Santiago, that preliminary study negative for DVT. Informed by  jordyn that patient has PCP at [facility] but has not followed up in some  time.  will follow up with patient regarding assisting appointment. Plan to treat with prednisone, albuterol inhaler and azithromycin. In addition, pt will benefit from ICS inhaler due to chronic bronchitis. Patient sx improved while in the ER with nebulizer and prednisone. Advised to start prednisone tomorrow  [NA]      ED Course User Index  [NA] Elaine Lr NP         Disposition:  Discharge   DISCHARGE NOTE:     Pt has been reexamined. Patient has no new complaints, changes, or physical findings. Care plan outlined and precautions discussed. All of pt's questions and concerns were addressed.  Patient was instructed and agrees to follow up with PCP, as well as to return to the ED upon further deterioration. Patient is ready to go home. Follow-up Information     Follow up With Specialties Details Why Contact Info        the  will contact [facility] to set up PCP care for you          Current Discharge Medication List      START taking these medications    Details   predniSONE (DELTASONE) 20 mg tablet Take 60 mg by mouth daily for 5 days. With Breakfast  Qty: 15 Tablet, Refills: 0  Start date: 4/29/2022, End date: 5/4/2022      albuterol (PROVENTIL HFA, VENTOLIN HFA, PROAIR HFA) 90 mcg/actuation inhaler Take 2 Puffs by inhalation every four (4) hours as needed for Wheezing. Qty: 18 g, Refills: 0  Start date: 4/29/2022      fluticasone propion-salmeteroL (Advair Diskus) 250-50 mcg/dose diskus inhaler Take 1 Puff by inhalation every twelve (12) hours. Qty: 1 Each, Refills: 0  Start date: 4/29/2022      azithromycin (Zithromax Z-Tejinder) 250 mg tablet Take 2 tabs by mouth once and then repeat daily for 4 days  Qty: 6 Tablet, Refills: 0  Start date: 4/29/2022         STOP taking these medications       HYDROcodone-acetaminophen (NORCO) 5-325 mg per tablet Comments:   Reason for Stopping:         ibuprofen (MOTRIN) 600 mg tablet Comments:   Reason for Stopping:                   Diagnosis     Clinical Impression:   1.  Chronic bronchitis, unspecified chronic bronchitis type ([address]    2. Elevated blood pressure reading

## 2022-04-29 NOTE — ED NOTES
Patient (s) was given copy of dc instructions and 0 paper script(s) and 3 electronic scripts. Patient (s)  verbalized understanding of instructions and script (s). Patient given a current medication reconciliation form and verbalized understanding of their medications. Patient (s) verbalized understanding of the importance of discussing medications with  his or her physician or clinic they will be following up with. Patient alert and oriented and in no acute distress. Patient offered wheelchair from treatment area to hospital entrance, patient  wheelchair.

## 2022-04-29 NOTE — DISCHARGE INSTRUCTIONS
It was a pleasure taking care of you at [facility] Emergency Department today. We know that when you come to [facility], you are entrusting us with your health, comfort, and safety. Our physicians and nurses honor that trust, and we truly appreciate the opportunity to care for you and your loved ones. We also value our feedback. If you receive a survey about your Emergency Department experience today, please fill it out. We care about our patients' feedback, and we listen to what you have to say. Thank you!

## 2022-05-02 PROCEDURE — 93970 EXTREMITY STUDY: CPT | Performed by: INTERNAL MEDICINE

## 2023-01-21 ENCOUNTER — APPOINTMENT (OUTPATIENT)
Dept: GENERAL RADIOLOGY | Age: 63
End: 2023-01-21
Attending: EMERGENCY MEDICINE
Payer: MEDICAID

## 2023-01-21 ENCOUNTER — HOSPITAL ENCOUNTER (EMERGENCY)
Age: 63
Discharge: HOME | End: 2023-01-21
Attending: EMERGENCY MEDICINE
Payer: MEDICAID

## 2023-01-21 VITALS
HEIGHT: 59 IN | RESPIRATION RATE: 18 BRPM | TEMPERATURE: 98 F | HEART RATE: 114 BPM | DIASTOLIC BLOOD PRESSURE: 77 MMHG | SYSTOLIC BLOOD PRESSURE: 125 MMHG | OXYGEN SATURATION: 96 % | WEIGHT: 134 LBS | BODY MASS INDEX: 27.01 KG/M2

## 2023-01-21 DIAGNOSIS — J20.9 ACUTE BRONCHITIS, UNSPECIFIED ORGANISM: Primary | ICD-10-CM

## 2023-01-21 LAB
FLUAV RNA SPEC QL NAA+PROBE: NOT DETECTED
FLUBV RNA SPEC QL NAA+PROBE: NOT DETECTED
SARS-COV-2, COV2: NOT DETECTED

## 2023-01-21 PROCEDURE — 99283 EMERGENCY DEPT VISIT LOW MDM: CPT

## 2023-01-21 PROCEDURE — 87636 SARSCOV2 & INF A&B AMP PRB: CPT

## 2023-01-21 PROCEDURE — 77030029684 HC NEB SM VOL KT MONA -A

## 2023-01-21 PROCEDURE — 71045 X-RAY EXAM CHEST 1 VIEW: CPT

## 2023-01-21 PROCEDURE — 74011250637 HC RX REV CODE- 250/637: Performed by: EMERGENCY MEDICINE

## 2023-01-21 PROCEDURE — 74011636637 HC RX REV CODE- 636/637: Performed by: EMERGENCY MEDICINE

## 2023-01-21 PROCEDURE — 74011000250 HC RX REV CODE- 250: Performed by: EMERGENCY MEDICINE

## 2023-01-21 PROCEDURE — 94640 AIRWAY INHALATION TREATMENT: CPT

## 2023-01-21 RX ORDER — PREDNISONE 20 MG/1
60 TABLET ORAL DAILY
Qty: 15 TABLET | Refills: 0 | Status: SHIPPED | OUTPATIENT
Start: 2023-01-21 | End: 2023-01-26

## 2023-01-21 RX ORDER — IPRATROPIUM BROMIDE AND ALBUTEROL SULFATE 2.5; .5 MG/3ML; MG/3ML
3 SOLUTION RESPIRATORY (INHALATION)
Status: COMPLETED | OUTPATIENT
Start: 2023-01-21 | End: 2023-01-21

## 2023-01-21 RX ORDER — ALBUTEROL SULFATE 90 UG/1
2 AEROSOL, METERED RESPIRATORY (INHALATION)
Qty: 1 EACH | Refills: 0 | Status: SHIPPED | OUTPATIENT
Start: 2023-01-21 | End: 2023-01-28

## 2023-01-21 RX ORDER — BENZONATATE 100 MG/1
100 CAPSULE ORAL
Qty: 30 CAPSULE | Refills: 0 | Status: SHIPPED | OUTPATIENT
Start: 2023-01-21 | End: 2023-01-28

## 2023-01-21 RX ORDER — PREDNISONE 20 MG/1
60 TABLET ORAL
Status: COMPLETED | OUTPATIENT
Start: 2023-01-21 | End: 2023-01-21

## 2023-01-21 RX ORDER — GUAIFENESIN 100 MG/5ML
200 SOLUTION ORAL
Status: COMPLETED | OUTPATIENT
Start: 2023-01-21 | End: 2023-01-21

## 2023-01-21 RX ADMIN — IPRATROPIUM BROMIDE AND ALBUTEROL SULFATE 3 ML: 2.5; .5 SOLUTION RESPIRATORY (INHALATION) at 11:18

## 2023-01-21 RX ADMIN — GUAIFENESIN 200 MG: 200 SOLUTION ORAL at 10:32

## 2023-01-21 RX ADMIN — PREDNISONE 60 MG: 20 TABLET ORAL at 10:33

## 2023-01-21 NOTE — LETTER
[facility] EMERGENCY DEPT  [address] [address] [phone]    Work/School Note    Date: 1/21/2023    To Whom It May concern:      Josh Levi was seen and treated today in the emergency room by the following provider(s):  Attending Provider: David Floyd MD.      Josh Levi is excused from work/school on 01/21/23. She is clear to return to work/school on 01/22/23.         Sincerely,          Batsheva Cary RN

## 2023-01-21 NOTE — ED PROVIDER NOTES
EMERGENCY DEPARTMENT HISTORY AND PHYSICAL EXAM      Date: 1/21/2023  Patient Name: Rose Gregory    History of Presenting Illness     Chief Complaint   Patient presents with    Cold Symptoms     Patient presents to ED with c/o cold symptom since yesterday       History Provided By: Patient    HPI: Rose Gregory, 65 y.o. female presents to the ED with cc of cough, chest congestion and body aches for the past 2 days. There are no other associated symptoms, patient concerns, or physical findings at this time. I reviewed the vital signs, available nursing notes, past medical history, past surgical history, family history and social history. Vital Signs:  Patient Vitals for the past 12 hrs:   Temp Pulse Resp BP SpO2   01/21/23 1010 98 °F (36.7 °C) (!) 114 18 (!) 108/96 95 %     Vital signs reviewed. Current Medications:  No current facility-administered medications on file prior to encounter. Current Outpatient Medications on File Prior to Encounter   Medication Sig Dispense Refill    albuterol (PROVENTIL HFA, VENTOLIN HFA, PROAIR HFA) 90 mcg/actuation inhaler Take 2 Puffs by inhalation every four (4) hours as needed for Wheezing. 18 g 0    fluticasone propion-salmeteroL (Advair Diskus) 250-50 mcg/dose diskus inhaler Take 1 Puff by inhalation every twelve (12) hours. 1 Each 0    azithromycin (Zithromax Z-Tejinder) 250 mg tablet Take 2 tabs by mouth once and then repeat daily for 4 days (Patient not taking: Reported on 1/21/2023) 6 Tablet 0       Past History     Past Medical History:  Past Medical History:   Diagnosis Date    Asthma     Drug abuse ([facility] [address]     states, \"I do crack every once in a while\"       Past Surgical History:  History reviewed. No pertinent surgical history. Family History:  History reviewed. No pertinent family history.     Social History:  Social History     Tobacco Use    Smoking status: Every Day     Packs/day: 1.00     Types: Cigarettes    Smokeless tobacco: Never   Substance Use Topics    Alcohol use: Yes    Drug use: Yes     Types: Cocaine     Comment: last use 10/26/2021       Allergies:  No Known Allergies      Review of Systems   Review of Systems   Constitutional:  Positive for chills. Negative for fever. HENT:  Positive for congestion. Negative for sore throat. Eyes:  Negative for photophobia and redness. Respiratory:  Positive for cough and shortness of breath. Negative for wheezing. Cardiovascular:  Negative for chest pain and leg swelling. Gastrointestinal:  Negative for abdominal pain, blood in stool, nausea and vomiting. Genitourinary:  Negative for difficulty urinating, dysuria, hematuria, menstrual problem and vaginal bleeding. Musculoskeletal:  Negative for back pain and joint swelling. Neurological:  Negative for dizziness, seizures, syncope, speech difficulty, weakness, numbness and headaches. Hematological:  Negative for adenopathy. Psychiatric/Behavioral:  Negative for agitation, confusion and suicidal ideas. The patient is not nervous/anxious. All other systems reviewed and are negative. Physical Exam   Physical Exam  Vitals and nursing note reviewed. Exam conducted with a chaperone present. Constitutional:       General: She is not in acute distress. Appearance: Normal appearance. She is well-developed. HENT:      Head: Normocephalic and atraumatic. Mouth/Throat:      Pharynx: No oropharyngeal exudate. Eyes:      General:         Right eye: No discharge. Left eye: No discharge. Extraocular Movements: Extraocular movements intact. Conjunctiva/sclera: Conjunctivae normal.      Pupils: Pupils are equal, round, and reactive to light. Neck:      Vascular: No JVD. Cardiovascular:      Rate and Rhythm: Normal rate and regular rhythm. Heart sounds: Normal heart sounds. Pulmonary:      Effort: Pulmonary effort is normal. No respiratory distress. Breath sounds: Rhonchi present. No wheezing. Abdominal:      General: Bowel sounds are normal. There is no distension. Palpations: Abdomen is soft. Tenderness: There is no abdominal tenderness. There is no guarding or rebound. Musculoskeletal:         General: No tenderness. Normal range of motion. Cervical back: Normal range of motion and neck supple. Lymphadenopathy:      Cervical: No cervical adenopathy. Skin:     General: Skin is warm and dry. Findings: No rash. Neurological:      Mental Status: She is alert and oriented to person, place, and time. Cranial Nerves: No cranial nerve deficit. Deep Tendon Reflexes: Reflexes are normal and symmetric. Psychiatric:         Behavior: Behavior normal.       Emergency Department Course   ED Course:  Initial assessment performed. The patient's complaints have been discussed, and they are in agreement with the care plan formulated and outlined with them. I have encouraged them to ask questions as they arise throughout their visit. EKG interpretation: (Preliminary)  EKG read and interpreted by Dakota Juárez MD     Medical Decision Making:  Bronchitis, pneumonia, influenza, COVID-19 infection. Critical Care Time:      Procedure:      Progress note:   Time:    Disposition:  DISCHARGED at 11:50 am,  I reviewed exam findings, diagnostic results, and clinical impression with patient. Counseled patient on diagnosis and care plan. Encouraged patient to ask questions and discussed need for follow up with primary care and to return to ED precautions. Patient expresses understanding at this time. I have reviewed discharge instructions with the patient and/or family/caregiver who verbalized understanding. The patient has been re-evaluated and is ready for discharge. Discharge instructions have been provided and explained to the patient. Ready for discharge. DISCHARGE PLAN:  1. Current Discharge Medication List        2. Follow-up Information    None       3.   Return to ED if current symptoms worsen or new symptoms arise. 4. Follow up with Other, Phys, MD in 3-5 days. Diagnosis     Clinical Impression: No diagnosis found.

## 2023-01-21 NOTE — ED NOTES
Bedside shift change report given to Elisa Mcrae (oncoming nurse) by Michelle Woodson (offgoing nurse). Report included the following information SBAR, Kardex, ED Summary, [facility] and Recent Results.

## 2023-04-28 ENCOUNTER — APPOINTMENT (OUTPATIENT)
Dept: CT IMAGING | Age: 63
End: 2023-04-28
Attending: EMERGENCY MEDICINE
Payer: MEDICAID

## 2023-04-28 ENCOUNTER — HOSPITAL ENCOUNTER (EMERGENCY)
Age: 63
Discharge: HOME | End: 2023-04-28
Attending: EMERGENCY MEDICINE
Payer: MEDICAID

## 2023-04-28 VITALS
SYSTOLIC BLOOD PRESSURE: 136 MMHG | OXYGEN SATURATION: 93 % | TEMPERATURE: 98.5 F | DIASTOLIC BLOOD PRESSURE: 67 MMHG | HEART RATE: 100 BPM | HEIGHT: 59 IN | WEIGHT: 133 LBS | RESPIRATION RATE: 24 BRPM | BODY MASS INDEX: 26.81 KG/M2

## 2023-04-28 DIAGNOSIS — R10.11 ABDOMINAL PAIN, RIGHT UPPER QUADRANT: ICD-10-CM

## 2023-04-28 DIAGNOSIS — F14.10 COCAINE ABUSE (HCC): ICD-10-CM

## 2023-04-28 DIAGNOSIS — R07.9 CHEST PAIN, UNSPECIFIED TYPE: Primary | ICD-10-CM

## 2023-04-28 DIAGNOSIS — D18.03 HEMANGIOMA OF LIVER: ICD-10-CM

## 2023-04-28 DIAGNOSIS — R06.02 SOB (SHORTNESS OF BREATH): ICD-10-CM

## 2023-04-28 LAB
ALBUMIN SERPL-MCNC: 4.2 G/DL (ref 3.5–5)
ALBUMIN/GLOB SERPL: 0.9 (ref 1.1–2.2)
ALP SERPL-CCNC: 90 U/L (ref 45–117)
ALT SERPL-CCNC: 29 U/L (ref 12–78)
ANION GAP BLD CALC-SCNC: 9 (ref 10–20)
ANION GAP SERPL CALC-SCNC: 9 MMOL/L (ref 5–15)
AST SERPL-CCNC: 35 U/L (ref 15–37)
ATRIAL RATE: 112 BPM
BASOPHILS # BLD: 0.1 K/UL (ref 0–0.1)
BASOPHILS NFR BLD: 1 % (ref 0–1)
BILIRUB SERPL-MCNC: 0.5 MG/DL (ref 0.2–1)
BNP SERPL-MCNC: 37 PG/ML (ref 0–125)
BUN SERPL-MCNC: 13 MG/DL (ref 6–20)
BUN/CREAT SERPL: 12 (ref 12–20)
CA-I BLD-MCNC: 1.19 MMOL/L (ref 1.12–1.32)
CALCIUM SERPL-MCNC: 9.5 MG/DL (ref 8.5–10.1)
CALCULATED P AXIS, ECG09: 70 DEGREES
CALCULATED R AXIS, ECG10: 10 DEGREES
CALCULATED T AXIS, ECG11: 53 DEGREES
CHLORIDE BLD-SCNC: 105 MMOL/L (ref 100–108)
CHLORIDE SERPL-SCNC: 101 MMOL/L (ref 97–108)
CO2 BLD-SCNC: 25 MMOL/L (ref 19–24)
CO2 SERPL-SCNC: 28 MMOL/L (ref 21–32)
CREAT SERPL-MCNC: 1.05 MG/DL (ref 0.55–1.02)
CREAT UR-MCNC: 0.9 MG/DL (ref 0.6–1.3)
DIAGNOSIS, 93000: NORMAL
DIFFERENTIAL METHOD BLD: ABNORMAL
EOSINOPHIL # BLD: 0.2 K/UL (ref 0–0.4)
EOSINOPHIL NFR BLD: 2 % (ref 0–7)
ERYTHROCYTE [DISTWIDTH] IN BLOOD BY AUTOMATED COUNT: 15.2 % (ref 11.5–14.5)
GLOBULIN SER CALC-MCNC: 4.5 G/DL (ref 2–4)
GLUCOSE BLD STRIP.AUTO-MCNC: 120 MG/DL (ref 74–106)
GLUCOSE SERPL-MCNC: 113 MG/DL (ref 65–100)
HCT VFR BLD AUTO: 41 % (ref 35–47)
HGB BLD-MCNC: 13.2 G/DL (ref 11.5–16)
IMM GRANULOCYTES # BLD AUTO: 0.1 K/UL (ref 0–0.04)
IMM GRANULOCYTES NFR BLD AUTO: 0 % (ref 0–0.5)
LACTATE BLD-SCNC: 1.25 MMOL/L (ref 0.4–2)
LIPASE SERPL-CCNC: 80 U/L (ref 73–393)
LYMPHOCYTES # BLD: 3.8 K/UL (ref 0.8–3.5)
LYMPHOCYTES NFR BLD: 33 % (ref 12–49)
MCH RBC QN AUTO: 27.5 PG (ref 26–34)
MCHC RBC AUTO-ENTMCNC: 32.2 G/DL (ref 30–36.5)
MCV RBC AUTO: 85.4 FL (ref 80–99)
MONOCYTES # BLD: 0.9 K/UL (ref 0–1)
MONOCYTES NFR BLD: 8 % (ref 5–13)
NEUTS SEG # BLD: 6.6 K/UL (ref 1.8–8)
NEUTS SEG NFR BLD: 56 % (ref 32–75)
NRBC # BLD: 0 K/UL (ref 0–0.01)
NRBC BLD-RTO: 0 PER 100 WBC
P-R INTERVAL, ECG05: 140 MS
PLATELET # BLD AUTO: 338 K/UL (ref 150–400)
PMV BLD AUTO: 11 FL (ref 8.9–12.9)
POTASSIUM BLD-SCNC: 4.1 MMOL/L (ref 3.5–5.5)
POTASSIUM SERPL-SCNC: 4.2 MMOL/L (ref 3.5–5.1)
PROT SERPL-MCNC: 8.7 G/DL (ref 6.4–8.2)
Q-T INTERVAL, ECG07: 328 MS
QRS DURATION, ECG06: 64 MS
QTC CALCULATION (BEZET), ECG08: 447 MS
RBC # BLD AUTO: 4.8 M/UL (ref 3.8–5.2)
SODIUM BLD-SCNC: 139 MMOL/L (ref 136–145)
SODIUM SERPL-SCNC: 138 MMOL/L (ref 136–145)
TROPONIN I SERPL HS-MCNC: 6 NG/L (ref 0–51)
TROPONIN I SERPL HS-MCNC: 6 NG/L (ref 0–51)
VENTRICULAR RATE, ECG03: 112 BPM
WBC # BLD AUTO: 11.6 K/UL (ref 3.6–11)

## 2023-04-28 PROCEDURE — 80053 COMPREHEN METABOLIC PANEL: CPT

## 2023-04-28 PROCEDURE — 93005 ELECTROCARDIOGRAM TRACING: CPT

## 2023-04-28 PROCEDURE — 85025 COMPLETE CBC W/AUTO DIFF WBC: CPT

## 2023-04-28 PROCEDURE — 99285 EMERGENCY DEPT VISIT HI MDM: CPT

## 2023-04-28 PROCEDURE — 83605 ASSAY OF LACTIC ACID: CPT

## 2023-04-28 PROCEDURE — 83880 ASSAY OF NATRIURETIC PEPTIDE: CPT

## 2023-04-28 PROCEDURE — 74011250636 HC RX REV CODE- 250/636: Performed by: EMERGENCY MEDICINE

## 2023-04-28 PROCEDURE — 96374 THER/PROPH/DIAG INJ IV PUSH: CPT

## 2023-04-28 PROCEDURE — 80047 BASIC METABLC PNL IONIZED CA: CPT

## 2023-04-28 PROCEDURE — 96375 TX/PRO/DX INJ NEW DRUG ADDON: CPT

## 2023-04-28 PROCEDURE — 74011000636 HC RX REV CODE- 636: Performed by: EMERGENCY MEDICINE

## 2023-04-28 PROCEDURE — 94760 N-INVAS EAR/PLS OXIMETRY 1: CPT

## 2023-04-28 PROCEDURE — 83690 ASSAY OF LIPASE: CPT

## 2023-04-28 PROCEDURE — 84484 ASSAY OF TROPONIN QUANT: CPT

## 2023-04-28 PROCEDURE — 71275 CT ANGIOGRAPHY CHEST: CPT

## 2023-04-28 PROCEDURE — 36415 COLL VENOUS BLD VENIPUNCTURE: CPT

## 2023-04-28 PROCEDURE — 74011000250 HC RX REV CODE- 250: Performed by: EMERGENCY MEDICINE

## 2023-04-28 PROCEDURE — 94640 AIRWAY INHALATION TREATMENT: CPT

## 2023-04-28 RX ORDER — LORAZEPAM 2 MG/ML
1 INJECTION INTRAMUSCULAR
Status: COMPLETED | OUTPATIENT
Start: 2023-04-28 | End: 2023-04-28

## 2023-04-28 RX ORDER — KETOROLAC TROMETHAMINE 30 MG/ML
15 INJECTION, SOLUTION INTRAMUSCULAR; INTRAVENOUS
Status: COMPLETED | OUTPATIENT
Start: 2023-04-28 | End: 2023-04-28

## 2023-04-28 RX ORDER — IPRATROPIUM BROMIDE AND ALBUTEROL SULFATE 2.5; .5 MG/3ML; MG/3ML
3 SOLUTION RESPIRATORY (INHALATION)
Status: COMPLETED | OUTPATIENT
Start: 2023-04-28 | End: 2023-04-28

## 2023-04-28 RX ADMIN — IPRATROPIUM BROMIDE AND ALBUTEROL SULFATE 3 ML: .5; 3 SOLUTION RESPIRATORY (INHALATION) at 18:47

## 2023-04-28 RX ADMIN — KETOROLAC TROMETHAMINE 15 MG: 30 INJECTION, SOLUTION INTRAMUSCULAR at 17:04

## 2023-04-28 RX ADMIN — LORAZEPAM 1 MG: 2 INJECTION INTRAMUSCULAR; INTRAVENOUS at 16:13

## 2023-04-28 RX ADMIN — METHYLPREDNISOLONE SODIUM SUCCINATE 125 MG: 125 INJECTION, POWDER, FOR SOLUTION INTRAMUSCULAR; INTRAVENOUS at 18:54

## 2023-04-28 RX ADMIN — IOPAMIDOL 100 ML: 755 INJECTION, SOLUTION INTRAVENOUS at 18:37

## 2023-04-28 NOTE — ED NOTES
Per pt reports sob that started today with non productive cough x 1 year, +bilateral breast spasms, admits to using cocaine yesterday. PMHx of chronic bronchitis. Pt is alert and oriented x 4, speech is clear, no acute distress noted. Emergency Department Nursing Plan of Care       The Nursing Plan of Care is developed from the Nursing assessment and Emergency Department Attending provider initial evaluation. The plan of care may be reviewed in the ED Provider note.     The Plan of Care was developed with the following considerations:   Patient / Family readiness to learn indicated by:verbalized understanding  Persons(s) to be included in education: patient  Barriers to Learning/Limitations:No    [address] [facility], [place]    4/28/2023   3:28 PM

## 2023-04-28 NOTE — DISCHARGE INSTRUCTIONS
You were evaluated in the emergency department for chest pain and shortness of breath. Your examination was reassuring as was your work-up including blood work, EKG, chest xray, and CT scan. It will be important for you to follow-up with your primary care physician in 2-3 days. If you develop worsening symptoms such as worsening chest pain, shortness of breath, or abdominal pain, please return to the emergency department immediately. CTA CHEST ABD PELV W CONT    Result Date: 4/28/2023  No evidence of pulmonary embolism. No aortic aneurysm or dissection. No acute process is identified in the chest, abdomen or pelvis. Enhancing focus in the left hepatic lobe most likely represents a hemangioma. There are additional areas of hyper enhancement hepatic parenchyma which also most likely represent hemangiomas. Nonemergent abdominal ultrasound for further delineation recommended. Incidental/nonemergent findings are as described above.

## 2023-04-28 NOTE — ED TRIAGE NOTES
Increased shortness of breath today. Cough worsened over last few days. Chest pain with cough.   History of chronic bronchitis

## 2023-04-28 NOTE — ED PROVIDER NOTES
[facility] EMERGENCY DEPT  EMERGENCY DEPARTMENT ENCOUNTER       Pt Name: Bernardino Hawk  MRN: [id number]  [place] 1960  Date of evaluation: 4/28/2023  Provider: Allyn Parrish MD   PCP: Octavia Conley MD  Note Started: 3:10 PM 4/28/23     CHIEF COMPLAINT       Chief Complaint   Patient presents with    Shortness of Breath    Cough        HISTORY OF PRESENT ILLNESS: 1 or more elements      History From: Patient, History limited by: No limitations     Bernardino Hawk is a 65 y.o. female with history of polysubstance abuse who presents with chief complaint of chest pain, right breast pain, diffuse abdominal pain. Symptoms are associated with coughing. Symptoms have been present over the past 2 to 3 days. She describes symptoms as a spasm. Nursing Notes were all reviewed and agreed with or any disagreements were addressed in the HPI. REVIEW OF SYSTEMS        Positives and Pertinent negatives as per HPI. PAST HISTORY     Past Medical History:  Past Medical History:   Diagnosis Date    Asthma     Drug abuse ([facility] [address]     states, \"I do crack every once in a while\"       Past Surgical History:  History reviewed. No pertinent surgical history. Family History:  History reviewed. No pertinent family history. Social History:  Social History     Tobacco Use    Smoking status: Every Day     Packs/day: 1.00     Types: Cigarettes    Smokeless tobacco: Never   Substance Use Topics    Alcohol use: Yes    Drug use: Yes     Types: Cocaine     Comment: last use 10/26/2021       Allergies:  No Known Allergies    CURRENT MEDICATIONS      Previous Medications    ALBUTEROL (PROVENTIL HFA, VENTOLIN HFA, PROAIR HFA) 90 MCG/ACTUATION INHALER    Take 2 Puffs by inhalation every four (4) hours as needed for Wheezing.     AZITHROMYCIN (ZITHROMAX Z-LUKASZ) 250 MG TABLET    Take 2 tabs by mouth once and then repeat daily for 4 days    FLUTICASONE PROPION-SALMETEROL (ADVAIR DISKUS) 250-50 MCG/DOSE DISKUS INHALER    Take 1 Puff by inhalation every twelve (12) hours. SCREENINGS               No data recorded         PHYSICAL EXAM      ED Triage Vitals [04/28/23 1400]   ED Encounter Vitals Group      /67      Pulse (Heart Rate) (!) 101      Resp Rate 24      Temp 98.5 °F (36.9 °C)      Temp src       O2 Sat (%) 96 %      Weight 133 lb      Height 4' 11\"        Physical Exam     DIAGNOSTIC RESULTS   LABS:     No results found for this or any previous visit (from the past 12 hour(s)). EKG: If performed, independent interpretation documented below in the MDM section     RADIOLOGY:  Non-plain film images such as CT, Ultrasound and MRI are read by the radiologist. Plain radiographic images are visualized and preliminarily interpreted by the ED Provider with the findings documented in the MDM section. Interpretation per the Radiologist below, if available at the time of this note:     No results found. PROCEDURES   Unless otherwise noted below, none  Procedures     CRITICAL CARE TIME   ***    EMERGENCY DEPARTMENT COURSE and DIFFERENTIAL DIAGNOSIS/MDM   Vitals:    Vitals:    04/28/23 1400   BP: 136/67   Pulse: (!) 101   Resp: 24   Temp: 98.5 °F (36.9 °C)   SpO2: 96%   Weight: 60.3 kg (133 lb)   Height: 4' 11\" (1.499 m)        Patient was given the following medications:  Medications   LORazepam (ATIVAN) injection 1 mg (has no administration in time range)       Medical Decision Making  Amount and/or Complexity of Data Reviewed  Labs: ordered. Radiology: ordered. Risk  Prescription drug management. ***           Cardiac Monitoring: The cardiac monitor revealed the following rhythm as interpreted by me: {Rhythm including paccardio:81823}    The cardiac monitor was ordered secondary to the patient's reported complaint of *** and to monitor the patient for dysrhythmia. Soledad Cherry MD      FINAL IMPRESSION   No diagnosis found.       DISPOSITION/PLAN     ***Discharge Note:  The patient has been re-evaluated and is ready for discharge. Reviewed available results with patient. Counseled patient on diagnosis and care plan. Patient has expressed understanding, and all questions have been answered. Patient agrees with plan and agrees to follow up as recommended, or to return to the ED if their symptoms worsen. Discharge instructions have been provided and explained to the patient, along with reasons to return to the ED.        ***Admission Note:  Patient is being admitted to the hospital by  ***, Service: Hospitalist.  The results of their tests and reasons for their admission have been discussed with them and available family. They convey agreement and understanding for the need to be admitted and for their admission diagnosis. CLINICAL IMPRESSION  No diagnosis found. DISPOSITION  ***     PATIENT REFERRED TO:  Follow-up Information    None           DISCHARGE MEDICATIONS:  Current Discharge Medication List            DISCONTINUED MEDICATIONS:  Current Discharge Medication List          I am the Primary Clinician of Record. Sony Crowder MD (electronically signed)    (Please note that parts of this dictation were completed with voice recognition software. Quite often unanticipated grammatical, syntax, homophones, and other interpretive errors are inadvertently transcribed by the computer software. Please disregards these errors.  Please excuse any errors that have escaped final proofreading.)

## 2023-04-28 NOTE — ED NOTES
Bedside and Verbal shift change report given to Lauren Chaudhry  (oncoming nurse) by Sruthi Chacko (offgoing nurse). Report included the following information SBAR, ED Summary, MAR and Recent Results.

## 2023-05-01 LAB
ATRIAL RATE: 112 BPM
CALCULATED P AXIS, ECG09: 70 DEGREES
CALCULATED R AXIS, ECG10: 10 DEGREES
CALCULATED T AXIS, ECG11: 53 DEGREES
DIAGNOSIS, 93000: NORMAL
P-R INTERVAL, ECG05: 140 MS
Q-T INTERVAL, ECG07: 328 MS
QRS DURATION, ECG06: 64 MS
QTC CALCULATION (BEZET), ECG08: 447 MS
VENTRICULAR RATE, ECG03: 112 BPM

## 2023-09-16 ENCOUNTER — APPOINTMENT (OUTPATIENT)
Facility: HOSPITAL | Age: 63
End: 2023-09-16
Payer: MEDICAID

## 2023-09-16 ENCOUNTER — HOSPITAL ENCOUNTER (EMERGENCY)
Facility: HOSPITAL | Age: 63
Discharge: HOME | End: 2023-09-16
Attending: EMERGENCY MEDICINE
Payer: MEDICAID

## 2023-09-16 VITALS
BODY MASS INDEX: 26.21 KG/M2 | OXYGEN SATURATION: 100 % | HEART RATE: 115 BPM | WEIGHT: 130 LBS | DIASTOLIC BLOOD PRESSURE: 105 MMHG | TEMPERATURE: 98.5 F | RESPIRATION RATE: 20 BRPM | SYSTOLIC BLOOD PRESSURE: 157 MMHG | HEIGHT: 59 IN

## 2023-09-16 DIAGNOSIS — J45.901 ASTHMA WITH ACUTE EXACERBATION, UNSPECIFIED ASTHMA SEVERITY, UNSPECIFIED WHETHER PERSISTENT: Primary | ICD-10-CM

## 2023-09-16 DIAGNOSIS — R04.0 EPISTAXIS: ICD-10-CM

## 2023-09-16 DIAGNOSIS — S02.2XXA CLOSED FRACTURE OF NASAL BONE, INITIAL ENCOUNTER: ICD-10-CM

## 2023-09-16 DIAGNOSIS — F19.10 SUBSTANCE ABUSE (HCC): ICD-10-CM

## 2023-09-16 LAB
ANION GAP SERPL CALC-SCNC: 12 MMOL/L (ref 5–15)
BASOPHILS # BLD: 0.1 K/UL (ref 0–0.1)
BASOPHILS NFR BLD: 1 % (ref 0–1)
BUN SERPL-MCNC: 10 MG/DL (ref 6–20)
BUN/CREAT SERPL: 11 (ref 12–20)
CALCIUM SERPL-MCNC: 9.5 MG/DL (ref 8.5–10.1)
CHLORIDE SERPL-SCNC: 102 MMOL/L (ref 97–108)
CO2 SERPL-SCNC: 25 MMOL/L (ref 21–32)
CREAT SERPL-MCNC: 0.87 MG/DL (ref 0.55–1.02)
DIFFERENTIAL METHOD BLD: ABNORMAL
EKG ATRIAL RATE: 117 BPM
EKG DIAGNOSIS: NORMAL
EKG P AXIS: 53 DEGREES
EKG P-R INTERVAL: 144 MS
EKG Q-T INTERVAL: 326 MS
EKG QRS DURATION: 66 MS
EKG QTC CALCULATION (BAZETT): 454 MS
EKG R AXIS: -8 DEGREES
EKG T AXIS: 42 DEGREES
EKG VENTRICULAR RATE: 117 BPM
EOSINOPHIL # BLD: 0.1 K/UL (ref 0–0.4)
EOSINOPHIL NFR BLD: 1 % (ref 0–7)
ERYTHROCYTE [DISTWIDTH] IN BLOOD BY AUTOMATED COUNT: 17.9 % (ref 11.5–14.5)
GLUCOSE SERPL-MCNC: 101 MG/DL (ref 65–100)
HCT VFR BLD AUTO: 32.7 % (ref 35–47)
HGB BLD-MCNC: 9.9 G/DL (ref 11.5–16)
IMM GRANULOCYTES # BLD AUTO: 0 K/UL (ref 0–0.04)
IMM GRANULOCYTES NFR BLD AUTO: 0 % (ref 0–0.5)
LYMPHOCYTES # BLD: 2.3 K/UL (ref 0.8–3.5)
LYMPHOCYTES NFR BLD: 25 % (ref 12–49)
MCH RBC QN AUTO: 22.8 PG (ref 26–34)
MCHC RBC AUTO-ENTMCNC: 30.3 G/DL (ref 30–36.5)
MCV RBC AUTO: 75.2 FL (ref 80–99)
MONOCYTES # BLD: 0.6 K/UL (ref 0–1)
MONOCYTES NFR BLD: 6 % (ref 5–13)
NEUTS SEG # BLD: 6 K/UL (ref 1.8–8)
NEUTS SEG NFR BLD: 67 % (ref 32–75)
NRBC # BLD: 0 K/UL (ref 0–0.01)
NRBC BLD-RTO: 0 PER 100 WBC
PLATELET # BLD AUTO: 310 K/UL (ref 150–400)
PMV BLD AUTO: 10.2 FL (ref 8.9–12.9)
POTASSIUM SERPL-SCNC: 3.9 MMOL/L (ref 3.5–5.1)
RBC # BLD AUTO: 4.35 M/UL (ref 3.8–5.2)
SODIUM SERPL-SCNC: 139 MMOL/L (ref 136–145)
TROPONIN I SERPL HS-MCNC: 10 NG/L (ref 0–51)
WBC # BLD AUTO: 9 K/UL (ref 3.6–11)

## 2023-09-16 PROCEDURE — 30901 CONTROL OF NOSEBLEED: CPT

## 2023-09-16 PROCEDURE — 84484 ASSAY OF TROPONIN QUANT: CPT

## 2023-09-16 PROCEDURE — 70486 CT MAXILLOFACIAL W/O DYE: CPT

## 2023-09-16 PROCEDURE — 99285 EMERGENCY DEPT VISIT HI MDM: CPT

## 2023-09-16 PROCEDURE — 96374 THER/PROPH/DIAG INJ IV PUSH: CPT

## 2023-09-16 PROCEDURE — 2580000003 HC RX 258: Performed by: EMERGENCY MEDICINE

## 2023-09-16 PROCEDURE — 94640 AIRWAY INHALATION TREATMENT: CPT

## 2023-09-16 PROCEDURE — 70450 CT HEAD/BRAIN W/O DYE: CPT

## 2023-09-16 PROCEDURE — 80048 BASIC METABOLIC PNL TOTAL CA: CPT

## 2023-09-16 PROCEDURE — 6370000000 HC RX 637 (ALT 250 FOR IP): Performed by: EMERGENCY MEDICINE

## 2023-09-16 PROCEDURE — 85025 COMPLETE CBC W/AUTO DIFF WBC: CPT

## 2023-09-16 PROCEDURE — 96361 HYDRATE IV INFUSION ADD-ON: CPT

## 2023-09-16 PROCEDURE — 36415 COLL VENOUS BLD VENIPUNCTURE: CPT

## 2023-09-16 PROCEDURE — 6360000002 HC RX W HCPCS: Performed by: EMERGENCY MEDICINE

## 2023-09-16 PROCEDURE — 71045 X-RAY EXAM CHEST 1 VIEW: CPT

## 2023-09-16 RX ORDER — OXYMETAZOLINE HYDROCHLORIDE 0.05 G/100ML
2 SPRAY NASAL ONCE
Status: COMPLETED | OUTPATIENT
Start: 2023-09-16 | End: 2023-09-16

## 2023-09-16 RX ORDER — 0.9 % SODIUM CHLORIDE 0.9 %
1000 INTRAVENOUS SOLUTION INTRAVENOUS ONCE
Status: COMPLETED | OUTPATIENT
Start: 2023-09-16 | End: 2023-09-16

## 2023-09-16 RX ORDER — DEXAMETHASONE SODIUM PHOSPHATE 10 MG/ML
10 INJECTION INTRAMUSCULAR; INTRAVENOUS ONCE
Status: COMPLETED | OUTPATIENT
Start: 2023-09-16 | End: 2023-09-16

## 2023-09-16 RX ORDER — TRANEXAMIC ACID 100 MG/ML
100 INJECTION, SOLUTION INTRAVENOUS
Status: DISCONTINUED | OUTPATIENT
Start: 2023-09-16 | End: 2023-09-16 | Stop reason: HOSPADM

## 2023-09-16 RX ORDER — IPRATROPIUM BROMIDE AND ALBUTEROL SULFATE 2.5; .5 MG/3ML; MG/3ML
1 SOLUTION RESPIRATORY (INHALATION)
Status: COMPLETED | OUTPATIENT
Start: 2023-09-16 | End: 2023-09-16

## 2023-09-16 RX ADMIN — IPRATROPIUM BROMIDE AND ALBUTEROL SULFATE 1 DOSE: .5; 3 SOLUTION RESPIRATORY (INHALATION) at 18:18

## 2023-09-16 RX ADMIN — SODIUM CHLORIDE 1000 ML: 9 INJECTION, SOLUTION INTRAVENOUS at 18:39

## 2023-09-16 RX ADMIN — DEXAMETHASONE SODIUM PHOSPHATE 10 MG: 10 INJECTION INTRAMUSCULAR; INTRAVENOUS at 18:38

## 2023-09-16 RX ADMIN — NASAL DECONGESTANT 2 SPRAY: 0.05 SPRAY NASAL at 18:09

## 2023-09-16 ASSESSMENT — PAIN - FUNCTIONAL ASSESSMENT: PAIN_FUNCTIONAL_ASSESSMENT: NONE - DENIES PAIN

## 2023-09-16 NOTE — DISCHARGE INSTRUCTIONS
PLEASE FOLLOW UP WITH AN EAR NOSE AND THROAT SPECIALIST WITHIN 5 DAYS FOR FURTHER MANAGEMENT OF YOUR NOSE BLEED AND YOUR BROKEN NOSE. ATTACHED ARE RECOMMENDATIONS FOR CLINICS WHERE YOU CAN FOLLOW UP BUT YOU MAY GO TO ANY EAR NOSE AND THROAT SPECIALIST OF YOUR CHOICE. How can you care for your broken nose? - Leave nasal packing in place until a doctor removes it. - Put ice or a cold pack on your nose for 10 to 20 minutes at a time. Try to do this every 1 to 2 hours for the first 3 days (when you are awake) or until the swelling goes down. Put a thin cloth between the ice pack and your skin. - Sleep with your head slightly raised until the swelling goes down. Prop up your head and shoulders on pillows. - Ask your doctor when it's okay to return to your normal activities.

## 2023-09-16 NOTE — ED NOTES
General: well developed   VS: tachycardic and hypertensive upon arrival   Neuro: alert and oriented x4, follows commands, no neuro deficits   HEENT: chief complaint of nose bleed for the past hour. The bleeding was coming from both nostrils. Soaked through approx 8 gauze pads prior to administering afrin. Hearing and vision intact. Membranes moist   Cardiovascular: tachycardic and hypertensive. No chest pain. Respiratory: patient has audible wheezing and visible increased work of breathing. Adequate oxygenation on RA.   Abdominal: soft, non tender   Genitourinary: wdl   Musculoskeletal: ambulatory   Psych: appropriate     Appearance: increased work of breathing        Michelle Morales RN  09/16/23 3056
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
## 2023-09-17 NOTE — ED NOTES
Discharge instructions were given to the patient by May Austin RN. The patient left the Emergency Department ambulatory, alert and oriented and in no acute distress with 0 prescriptions. The patient was encouraged to call or return to the ED for worsening issues or problems and was encouraged to schedule a follow up appointment for continuing care. The patient verbalized understanding of discharge instructions and prescriptions, all questions were answered. The patient has no further concerns at this time.         Tyler Hernández RN  09/16/23 2100

## 2023-09-18 LAB
EKG ATRIAL RATE: 117 BPM
EKG DIAGNOSIS: NORMAL
EKG P AXIS: 53 DEGREES
EKG P-R INTERVAL: 144 MS
EKG Q-T INTERVAL: 326 MS
EKG QRS DURATION: 66 MS
EKG QTC CALCULATION (BAZETT): 454 MS
EKG R AXIS: -8 DEGREES
EKG T AXIS: 42 DEGREES
EKG VENTRICULAR RATE: 117 BPM

## 2024-03-18 ENCOUNTER — HOSPITAL ENCOUNTER (INPATIENT)
Facility: HOSPITAL | Age: 64
LOS: 2 days | Discharge: HOME | End: 2024-03-21
Attending: EMERGENCY MEDICINE | Admitting: GENERAL ACUTE CARE HOSPITAL
Payer: MEDICAID

## 2024-03-18 ENCOUNTER — APPOINTMENT (OUTPATIENT)
Facility: HOSPITAL | Age: 64
End: 2024-03-18
Payer: MEDICAID

## 2024-03-18 DIAGNOSIS — J44.1 COPD EXACERBATION (HCC): ICD-10-CM

## 2024-03-18 DIAGNOSIS — I21.4 NSTEMI (NON-ST ELEVATED MYOCARDIAL INFARCTION) (HCC): Primary | ICD-10-CM

## 2024-03-18 LAB
ALBUMIN SERPL-MCNC: 3.6 G/DL (ref 3.5–5)
ALBUMIN/GLOB SERPL: 0.9 (ref 1.1–2.2)
ALP SERPL-CCNC: 80 U/L (ref 45–117)
ALT SERPL-CCNC: 26 U/L (ref 12–78)
ANION GAP SERPL CALC-SCNC: 6 MMOL/L (ref 5–15)
AST SERPL-CCNC: 24 U/L (ref 15–37)
BASOPHILS # BLD: 0.1 K/UL (ref 0–0.1)
BASOPHILS NFR BLD: 1 % (ref 0–1)
BILIRUB SERPL-MCNC: 0.1 MG/DL (ref 0.2–1)
BUN SERPL-MCNC: 11 MG/DL (ref 6–20)
BUN/CREAT SERPL: 12 (ref 12–20)
CALCIUM SERPL-MCNC: 8.6 MG/DL (ref 8.5–10.1)
CHLORIDE SERPL-SCNC: 108 MMOL/L (ref 97–108)
CO2 SERPL-SCNC: 23 MMOL/L (ref 21–32)
CREAT SERPL-MCNC: 0.9 MG/DL (ref 0.55–1.02)
DIFFERENTIAL METHOD BLD: ABNORMAL
EOSINOPHIL # BLD: 0.1 K/UL (ref 0–0.4)
EOSINOPHIL NFR BLD: 1 % (ref 0–7)
ERYTHROCYTE [DISTWIDTH] IN BLOOD BY AUTOMATED COUNT: 16.4 % (ref 11.5–14.5)
GLOBULIN SER CALC-MCNC: 4 G/DL (ref 2–4)
GLUCOSE SERPL-MCNC: 138 MG/DL (ref 65–100)
HCT VFR BLD AUTO: 34.1 % (ref 35–47)
HGB BLD-MCNC: 10.3 G/DL (ref 11.5–16)
IMM GRANULOCYTES # BLD AUTO: 0.1 K/UL (ref 0–0.04)
IMM GRANULOCYTES NFR BLD AUTO: 1 % (ref 0–0.5)
LYMPHOCYTES # BLD: 0.9 K/UL (ref 0.8–3.5)
LYMPHOCYTES NFR BLD: 8 % (ref 12–49)
MCH RBC QN AUTO: 28.6 PG (ref 26–34)
MCHC RBC AUTO-ENTMCNC: 30.2 G/DL (ref 30–36.5)
MCV RBC AUTO: 94.7 FL (ref 80–99)
MONOCYTES # BLD: 0.5 K/UL (ref 0–1)
MONOCYTES NFR BLD: 4 % (ref 5–13)
NEUTS SEG # BLD: 9.5 K/UL (ref 1.8–8)
NEUTS SEG NFR BLD: 85 % (ref 32–75)
NRBC # BLD: 0 K/UL (ref 0–0.01)
NRBC BLD-RTO: 0 PER 100 WBC
NT PRO BNP: 56 PG/ML
PLATELET # BLD AUTO: 306 K/UL (ref 150–400)
PMV BLD AUTO: 10.3 FL (ref 8.9–12.9)
POTASSIUM SERPL-SCNC: 3.8 MMOL/L (ref 3.5–5.1)
PROT SERPL-MCNC: 7.6 G/DL (ref 6.4–8.2)
RBC # BLD AUTO: 3.6 M/UL (ref 3.8–5.2)
SODIUM SERPL-SCNC: 137 MMOL/L (ref 136–145)
TROPONIN I SERPL HS-MCNC: 235 NG/L (ref 0–51)
WBC # BLD AUTO: 11.1 K/UL (ref 3.6–11)

## 2024-03-18 PROCEDURE — 84484 ASSAY OF TROPONIN QUANT: CPT

## 2024-03-18 PROCEDURE — 6360000004 HC RX CONTRAST MEDICATION: Performed by: EMERGENCY MEDICINE

## 2024-03-18 PROCEDURE — 71275 CT ANGIOGRAPHY CHEST: CPT

## 2024-03-18 PROCEDURE — 36415 COLL VENOUS BLD VENIPUNCTURE: CPT

## 2024-03-18 PROCEDURE — 80053 COMPREHEN METABOLIC PANEL: CPT

## 2024-03-18 PROCEDURE — 99291 CRITICAL CARE FIRST HOUR: CPT

## 2024-03-18 PROCEDURE — 6370000000 HC RX 637 (ALT 250 FOR IP): Performed by: EMERGENCY MEDICINE

## 2024-03-18 PROCEDURE — 85025 COMPLETE CBC W/AUTO DIFF WBC: CPT

## 2024-03-18 PROCEDURE — 83880 ASSAY OF NATRIURETIC PEPTIDE: CPT

## 2024-03-18 PROCEDURE — 71045 X-RAY EXAM CHEST 1 VIEW: CPT

## 2024-03-18 PROCEDURE — 93005 ELECTROCARDIOGRAM TRACING: CPT | Performed by: EMERGENCY MEDICINE

## 2024-03-18 RX ORDER — IPRATROPIUM BROMIDE AND ALBUTEROL SULFATE 2.5; .5 MG/3ML; MG/3ML
1 SOLUTION RESPIRATORY (INHALATION)
Status: COMPLETED | OUTPATIENT
Start: 2024-03-18 | End: 2024-03-18

## 2024-03-18 RX ADMIN — IPRATROPIUM BROMIDE AND ALBUTEROL SULFATE 1 DOSE: .5; 3 SOLUTION RESPIRATORY (INHALATION) at 21:25

## 2024-03-18 RX ADMIN — IOPAMIDOL 60 ML: 755 INJECTION, SOLUTION INTRAVENOUS at 22:54

## 2024-03-18 ASSESSMENT — PAIN - FUNCTIONAL ASSESSMENT: PAIN_FUNCTIONAL_ASSESSMENT: NONE - DENIES PAIN

## 2024-03-19 ENCOUNTER — APPOINTMENT (OUTPATIENT)
Facility: HOSPITAL | Age: 64
End: 2024-03-19
Payer: MEDICAID

## 2024-03-19 PROBLEM — I21.4 NSTEMI (NON-ST ELEVATED MYOCARDIAL INFARCTION) (HCC): Status: ACTIVE | Noted: 2024-03-19

## 2024-03-19 LAB
AMMONIA PLAS-SCNC: 23 UMOL/L
AMPHET UR QL SCN: NEGATIVE
ANION GAP SERPL CALC-SCNC: 5 MMOL/L (ref 5–15)
APTT PPP: 27.5 SEC (ref 22.1–31)
B PERT DNA SPEC QL NAA+PROBE: NOT DETECTED
BARBITURATES UR QL SCN: NEGATIVE
BASOPHILS # BLD: 0 K/UL (ref 0–0.1)
BASOPHILS NFR BLD: 0 % (ref 0–1)
BENZODIAZ UR QL: NEGATIVE
BORDETELLA PARAPERTUSSIS BY PCR: NOT DETECTED
BUN SERPL-MCNC: 9 MG/DL (ref 6–20)
BUN/CREAT SERPL: 12 (ref 12–20)
C PNEUM DNA SPEC QL NAA+PROBE: NOT DETECTED
CALCIUM SERPL-MCNC: 9.6 MG/DL (ref 8.5–10.1)
CANNABINOIDS UR QL SCN: NEGATIVE
CHLORIDE SERPL-SCNC: 108 MMOL/L (ref 97–108)
CO2 SERPL-SCNC: 24 MMOL/L (ref 21–32)
COCAINE UR QL SCN: POSITIVE
CREAT SERPL-MCNC: 0.73 MG/DL (ref 0.55–1.02)
DIFFERENTIAL METHOD BLD: ABNORMAL
EKG ATRIAL RATE: 113 BPM
EKG DIAGNOSIS: NORMAL
EKG P AXIS: 51 DEGREES
EKG P-R INTERVAL: 144 MS
EKG Q-T INTERVAL: 340 MS
EKG QRS DURATION: 64 MS
EKG QTC CALCULATION (BAZETT): 466 MS
EKG R AXIS: 3 DEGREES
EKG T AXIS: 49 DEGREES
EKG VENTRICULAR RATE: 113 BPM
EOSINOPHIL # BLD: 0 K/UL (ref 0–0.4)
EOSINOPHIL NFR BLD: 0 % (ref 0–7)
ERYTHROCYTE [DISTWIDTH] IN BLOOD BY AUTOMATED COUNT: 16.4 % (ref 11.5–14.5)
FLUAV SUBTYP SPEC NAA+PROBE: NOT DETECTED
FLUBV RNA SPEC QL NAA+PROBE: NOT DETECTED
GLUCOSE SERPL-MCNC: 150 MG/DL (ref 65–100)
HADV DNA SPEC QL NAA+PROBE: NOT DETECTED
HCOV 229E RNA SPEC QL NAA+PROBE: NOT DETECTED
HCOV HKU1 RNA SPEC QL NAA+PROBE: NOT DETECTED
HCOV NL63 RNA SPEC QL NAA+PROBE: NOT DETECTED
HCOV OC43 RNA SPEC QL NAA+PROBE: NOT DETECTED
HCT VFR BLD AUTO: 35 % (ref 35–47)
HGB BLD-MCNC: 10.6 G/DL (ref 11.5–16)
HMPV RNA SPEC QL NAA+PROBE: NOT DETECTED
HPIV1 RNA SPEC QL NAA+PROBE: NOT DETECTED
HPIV2 RNA SPEC QL NAA+PROBE: NOT DETECTED
HPIV3 RNA SPEC QL NAA+PROBE: NOT DETECTED
HPIV4 RNA SPEC QL NAA+PROBE: NOT DETECTED
IMM GRANULOCYTES # BLD AUTO: 0 K/UL (ref 0–0.04)
IMM GRANULOCYTES NFR BLD AUTO: 0 % (ref 0–0.5)
INR PPP: 1.1 (ref 0.9–1.1)
LACTATE SERPL-SCNC: 0.7 MMOL/L (ref 0.4–2)
LYMPHOCYTES # BLD: 0.8 K/UL (ref 0.8–3.5)
LYMPHOCYTES NFR BLD: 9 % (ref 12–49)
Lab: ABNORMAL
M PNEUMO DNA SPEC QL NAA+PROBE: NOT DETECTED
MCH RBC QN AUTO: 28.3 PG (ref 26–34)
MCHC RBC AUTO-ENTMCNC: 30.3 G/DL (ref 30–36.5)
MCV RBC AUTO: 93.3 FL (ref 80–99)
METHADONE UR QL: NEGATIVE
MONOCYTES # BLD: 0.1 K/UL (ref 0–1)
MONOCYTES NFR BLD: 1 % (ref 5–13)
NEUTS SEG # BLD: 8 K/UL (ref 1.8–8)
NEUTS SEG NFR BLD: 90 % (ref 32–75)
NRBC # BLD: 0 K/UL (ref 0–0.01)
NRBC BLD-RTO: 0 PER 100 WBC
OPIATES UR QL: NEGATIVE
PCP UR QL: NEGATIVE
PLATELET # BLD AUTO: 352 K/UL (ref 150–400)
PMV BLD AUTO: 11 FL (ref 8.9–12.9)
POTASSIUM SERPL-SCNC: 4.3 MMOL/L (ref 3.5–5.1)
PROCALCITONIN SERPL-MCNC: <0.05 NG/ML
PROTHROMBIN TIME: 11.3 SEC (ref 9–11.1)
RBC # BLD AUTO: 3.75 M/UL (ref 3.8–5.2)
RBC MORPH BLD: ABNORMAL
RSV RNA SPEC QL NAA+PROBE: NOT DETECTED
RV+EV RNA SPEC QL NAA+PROBE: NOT DETECTED
SARS-COV-2 RNA RESP QL NAA+PROBE: NOT DETECTED
SODIUM SERPL-SCNC: 137 MMOL/L (ref 136–145)
THERAPEUTIC RANGE: NORMAL SECS (ref 58–77)
TROPONIN I SERPL HS-MCNC: 791 NG/L (ref 0–51)
TROPONIN I SERPL HS-MCNC: 872 NG/L (ref 0–51)
TROPONIN I SERPL HS-MCNC: 895 NG/L (ref 0–51)
UFH PPP CHRO-ACNC: 0.24 IU/ML
UFH PPP CHRO-ACNC: <0.1 IU/ML
WBC # BLD AUTO: 8.9 K/UL (ref 3.6–11)

## 2024-03-19 PROCEDURE — 1100000003 HC PRIVATE W/ TELEMETRY

## 2024-03-19 PROCEDURE — 2700000000 HC OXYGEN THERAPY PER DAY

## 2024-03-19 PROCEDURE — 84145 PROCALCITONIN (PCT): CPT

## 2024-03-19 PROCEDURE — 80307 DRUG TEST PRSMV CHEM ANLYZR: CPT

## 2024-03-19 PROCEDURE — 70450 CT HEAD/BRAIN W/O DYE: CPT

## 2024-03-19 PROCEDURE — 85730 THROMBOPLASTIN TIME PARTIAL: CPT

## 2024-03-19 PROCEDURE — 36415 COLL VENOUS BLD VENIPUNCTURE: CPT

## 2024-03-19 PROCEDURE — 99222 1ST HOSP IP/OBS MODERATE 55: CPT | Performed by: INTERNAL MEDICINE

## 2024-03-19 PROCEDURE — 84484 ASSAY OF TROPONIN QUANT: CPT

## 2024-03-19 PROCEDURE — 6360000002 HC RX W HCPCS: Performed by: GENERAL ACUTE CARE HOSPITAL

## 2024-03-19 PROCEDURE — 2580000003 HC RX 258: Performed by: GENERAL ACUTE CARE HOSPITAL

## 2024-03-19 PROCEDURE — 2500000003 HC RX 250 WO HCPCS: Performed by: GENERAL ACUTE CARE HOSPITAL

## 2024-03-19 PROCEDURE — 85025 COMPLETE CBC W/AUTO DIFF WBC: CPT

## 2024-03-19 PROCEDURE — 96374 THER/PROPH/DIAG INJ IV PUSH: CPT

## 2024-03-19 PROCEDURE — 85610 PROTHROMBIN TIME: CPT

## 2024-03-19 PROCEDURE — 83605 ASSAY OF LACTIC ACID: CPT

## 2024-03-19 PROCEDURE — 95816 EEG AWAKE AND DROWSY: CPT

## 2024-03-19 PROCEDURE — 85520 HEPARIN ASSAY: CPT

## 2024-03-19 PROCEDURE — 0202U NFCT DS 22 TRGT SARS-COV-2: CPT

## 2024-03-19 PROCEDURE — 6360000002 HC RX W HCPCS: Performed by: EMERGENCY MEDICINE

## 2024-03-19 PROCEDURE — 6370000000 HC RX 637 (ALT 250 FOR IP): Performed by: GENERAL ACUTE CARE HOSPITAL

## 2024-03-19 PROCEDURE — 80048 BASIC METABOLIC PNL TOTAL CA: CPT

## 2024-03-19 PROCEDURE — 94640 AIRWAY INHALATION TREATMENT: CPT

## 2024-03-19 PROCEDURE — 82140 ASSAY OF AMMONIA: CPT

## 2024-03-19 RX ORDER — CLOPIDOGREL BISULFATE 75 MG/1
75 TABLET ORAL DAILY
COMMUNITY

## 2024-03-19 RX ORDER — ACETAMINOPHEN 650 MG/1
650 SUPPOSITORY RECTAL EVERY 6 HOURS PRN
Status: DISCONTINUED | OUTPATIENT
Start: 2024-03-19 | End: 2024-03-21 | Stop reason: HOSPADM

## 2024-03-19 RX ORDER — BUDESONIDE AND FORMOTEROL FUMARATE DIHYDRATE 80; 4.5 UG/1; UG/1
2 AEROSOL RESPIRATORY (INHALATION) 2 TIMES DAILY
Status: ON HOLD | COMMUNITY
End: 2024-03-21

## 2024-03-19 RX ORDER — CETIRIZINE HYDROCHLORIDE 10 MG/1
10 TABLET ORAL DAILY
COMMUNITY

## 2024-03-19 RX ORDER — SODIUM CHLORIDE 9 MG/ML
INJECTION, SOLUTION INTRAVENOUS CONTINUOUS
Status: DISCONTINUED | OUTPATIENT
Start: 2024-03-19 | End: 2024-03-19

## 2024-03-19 RX ORDER — SODIUM CHLORIDE 0.9 % (FLUSH) 0.9 %
5-40 SYRINGE (ML) INJECTION PRN
Status: DISCONTINUED | OUTPATIENT
Start: 2024-03-19 | End: 2024-03-21 | Stop reason: HOSPADM

## 2024-03-19 RX ORDER — POTASSIUM CHLORIDE 20 MEQ/1
40 TABLET, EXTENDED RELEASE ORAL PRN
Status: DISCONTINUED | OUTPATIENT
Start: 2024-03-19 | End: 2024-03-21 | Stop reason: HOSPADM

## 2024-03-19 RX ORDER — SODIUM CHLORIDE 0.9 % (FLUSH) 0.9 %
5-40 SYRINGE (ML) INJECTION EVERY 12 HOURS SCHEDULED
Status: DISCONTINUED | OUTPATIENT
Start: 2024-03-19 | End: 2024-03-21 | Stop reason: HOSPADM

## 2024-03-19 RX ORDER — HEPARIN SODIUM 1000 [USP'U]/ML
30 INJECTION, SOLUTION INTRAVENOUS; SUBCUTANEOUS PRN
Status: DISCONTINUED | OUTPATIENT
Start: 2024-03-19 | End: 2024-03-21 | Stop reason: ALTCHOICE

## 2024-03-19 RX ORDER — ONDANSETRON 4 MG/1
4 TABLET, ORALLY DISINTEGRATING ORAL EVERY 8 HOURS PRN
Status: DISCONTINUED | OUTPATIENT
Start: 2024-03-19 | End: 2024-03-21 | Stop reason: HOSPADM

## 2024-03-19 RX ORDER — HEPARIN SODIUM 1000 [USP'U]/ML
60 INJECTION, SOLUTION INTRAVENOUS; SUBCUTANEOUS PRN
Status: DISCONTINUED | OUTPATIENT
Start: 2024-03-19 | End: 2024-03-21 | Stop reason: ALTCHOICE

## 2024-03-19 RX ORDER — ONDANSETRON 2 MG/ML
4 INJECTION INTRAMUSCULAR; INTRAVENOUS EVERY 4 HOURS PRN
Status: DISCONTINUED | OUTPATIENT
Start: 2024-03-19 | End: 2024-03-19 | Stop reason: SDUPTHER

## 2024-03-19 RX ORDER — TRAZODONE HYDROCHLORIDE 50 MG/1
50 TABLET ORAL NIGHTLY
COMMUNITY

## 2024-03-19 RX ORDER — LANOLIN ALCOHOL/MO/W.PET/CERES
325 CREAM (GRAM) TOPICAL
COMMUNITY

## 2024-03-19 RX ORDER — POTASSIUM CHLORIDE 7.45 MG/ML
10 INJECTION INTRAVENOUS PRN
Status: DISCONTINUED | OUTPATIENT
Start: 2024-03-19 | End: 2024-03-21 | Stop reason: HOSPADM

## 2024-03-19 RX ORDER — HEPARIN SODIUM 1000 [USP'U]/ML
60 INJECTION, SOLUTION INTRAVENOUS; SUBCUTANEOUS ONCE
Status: COMPLETED | OUTPATIENT
Start: 2024-03-19 | End: 2024-03-19

## 2024-03-19 RX ORDER — ATORVASTATIN CALCIUM 20 MG/1
20 TABLET, FILM COATED ORAL DAILY
Status: ON HOLD | COMMUNITY
End: 2024-03-21 | Stop reason: HOSPADM

## 2024-03-19 RX ORDER — IPRATROPIUM BROMIDE AND ALBUTEROL SULFATE 2.5; .5 MG/3ML; MG/3ML
1 SOLUTION RESPIRATORY (INHALATION) EVERY 4 HOURS PRN
Status: DISCONTINUED | OUTPATIENT
Start: 2024-03-19 | End: 2024-03-21 | Stop reason: HOSPADM

## 2024-03-19 RX ORDER — ATORVASTATIN CALCIUM 40 MG/1
40 TABLET, FILM COATED ORAL NIGHTLY
Status: DISCONTINUED | OUTPATIENT
Start: 2024-03-19 | End: 2024-03-21 | Stop reason: HOSPADM

## 2024-03-19 RX ORDER — CARVEDILOL 6.25 MG/1
6.25 TABLET ORAL 2 TIMES DAILY WITH MEALS
Status: DISCONTINUED | OUTPATIENT
Start: 2024-03-19 | End: 2024-03-21 | Stop reason: HOSPADM

## 2024-03-19 RX ORDER — FOLIC ACID 1 MG/1
1 TABLET ORAL DAILY
COMMUNITY

## 2024-03-19 RX ORDER — POLYETHYLENE GLYCOL 3350 17 G/17G
17 POWDER, FOR SOLUTION ORAL DAILY PRN
Status: DISCONTINUED | OUTPATIENT
Start: 2024-03-19 | End: 2024-03-21 | Stop reason: HOSPADM

## 2024-03-19 RX ORDER — MAGNESIUM SULFATE IN WATER 40 MG/ML
2000 INJECTION, SOLUTION INTRAVENOUS PRN
Status: DISCONTINUED | OUTPATIENT
Start: 2024-03-19 | End: 2024-03-21 | Stop reason: HOSPADM

## 2024-03-19 RX ORDER — ONDANSETRON 2 MG/ML
4 INJECTION INTRAMUSCULAR; INTRAVENOUS EVERY 6 HOURS PRN
Status: DISCONTINUED | OUTPATIENT
Start: 2024-03-19 | End: 2024-03-21 | Stop reason: HOSPADM

## 2024-03-19 RX ORDER — ACETAMINOPHEN 325 MG/1
650 TABLET ORAL EVERY 6 HOURS PRN
Status: DISCONTINUED | OUTPATIENT
Start: 2024-03-19 | End: 2024-03-21 | Stop reason: HOSPADM

## 2024-03-19 RX ORDER — ASPIRIN 81 MG/1
81 TABLET, CHEWABLE ORAL DAILY
Status: ON HOLD | COMMUNITY
End: 2024-03-19 | Stop reason: ALTCHOICE

## 2024-03-19 RX ORDER — HYDROCHLOROTHIAZIDE 25 MG/1
25 TABLET ORAL DAILY
COMMUNITY

## 2024-03-19 RX ORDER — ACETAMINOPHEN 325 MG/1
650 TABLET ORAL EVERY 4 HOURS PRN
Status: DISCONTINUED | OUTPATIENT
Start: 2024-03-19 | End: 2024-03-19 | Stop reason: SDUPTHER

## 2024-03-19 RX ORDER — DOCUSATE SODIUM 100 MG/1
100 CAPSULE, LIQUID FILLED ORAL 2 TIMES DAILY PRN
COMMUNITY

## 2024-03-19 RX ORDER — BACILLUS COAGULANS 1B CELL
1 CAPSULE ORAL DAILY
COMMUNITY

## 2024-03-19 RX ORDER — HEPARIN SODIUM 10000 [USP'U]/100ML
5-30 INJECTION, SOLUTION INTRAVENOUS CONTINUOUS
Status: DISCONTINUED | OUTPATIENT
Start: 2024-03-19 | End: 2024-03-21 | Stop reason: ALTCHOICE

## 2024-03-19 RX ORDER — ASPIRIN 81 MG/1
81 TABLET ORAL DAILY
Status: DISCONTINUED | OUTPATIENT
Start: 2024-03-19 | End: 2024-03-21 | Stop reason: HOSPADM

## 2024-03-19 RX ORDER — SODIUM CHLORIDE 9 MG/ML
INJECTION, SOLUTION INTRAVENOUS PRN
Status: DISCONTINUED | OUTPATIENT
Start: 2024-03-19 | End: 2024-03-21 | Stop reason: HOSPADM

## 2024-03-19 RX ADMIN — ASPIRIN 81 MG: 81 TABLET, COATED ORAL at 08:56

## 2024-03-19 RX ADMIN — CARVEDILOL 6.25 MG: 6.25 TABLET, FILM COATED ORAL at 08:56

## 2024-03-19 RX ADMIN — SODIUM CHLORIDE, PRESERVATIVE FREE 10 ML: 5 INJECTION INTRAVENOUS at 21:32

## 2024-03-19 RX ADMIN — HEPARIN SODIUM 3540 UNITS: 1000 INJECTION INTRAVENOUS; SUBCUTANEOUS at 01:02

## 2024-03-19 RX ADMIN — WATER 40 MG: 1 INJECTION INTRAMUSCULAR; INTRAVENOUS; SUBCUTANEOUS at 17:26

## 2024-03-19 RX ADMIN — WATER 40 MG: 1 INJECTION INTRAMUSCULAR; INTRAVENOUS; SUBCUTANEOUS at 08:54

## 2024-03-19 RX ADMIN — ATORVASTATIN CALCIUM 40 MG: 40 TABLET, FILM COATED ORAL at 21:25

## 2024-03-19 RX ADMIN — SODIUM CHLORIDE, PRESERVATIVE FREE 10 ML: 5 INJECTION INTRAVENOUS at 08:57

## 2024-03-19 RX ADMIN — CARVEDILOL 6.25 MG: 6.25 TABLET, FILM COATED ORAL at 17:24

## 2024-03-19 RX ADMIN — HEPARIN SODIUM 1770 UNITS: 1000 INJECTION INTRAVENOUS; SUBCUTANEOUS at 08:50

## 2024-03-19 RX ADMIN — HEPARIN SODIUM AND DEXTROSE 12 UNITS/KG/HR: 10000; 5 INJECTION INTRAVENOUS at 01:05

## 2024-03-19 RX ADMIN — ARFORMOTEROL TARTRATE: 15 SOLUTION RESPIRATORY (INHALATION) at 21:04

## 2024-03-19 RX ADMIN — DOXYCYCLINE 100 MG: 100 INJECTION, POWDER, LYOPHILIZED, FOR SOLUTION INTRAVENOUS at 17:43

## 2024-03-19 NOTE — PROGRESS NOTES
Pharmacy Medication Reconciliation     Patient was not a good candidate for interview as patient is confused/delirious at a baseline. Reached out to patient's emergency contact, Amaya Freire who lives with the patient and is their cousin, per Amaya patient requires assistance with ADLs and is at her baseline mental status. She takes medications as written on bottles that were sent with patient via EMS. Medication rec is completed with medication bottles a patient brought with her and with insurance fill information available.       Allergy Update: Patient has no known allergies.    Recommendations/Findings:   The following amendments were made to the patient's active medication list on file at [facility]:   1) Additions:   -Symbicort 80-4.5 mcg/acuation - two puffs bid - per Amaya patient broke this medication PTA - unclear how long patient has been without     2) Deletions:   - Azithromycin  - Advair Diskus     3) Changes:   - Aspirin 81 mg tablets - OTC medication       Pertinent Findings:     Patient prescribed atorvastatin 20 mg po daily with the last fill 1/29/23 for 90 days, however bottle appeared nearly completely full, patient compliance is unknown.     Patient is on symbicort inhaler however may have not taken in last few days as family reports patient broke inhaler prior to admission          Clarified PTA med list with prescription bottles patient brought in and insurance fill information. PTA medication list was corrected to the following:     Prior to Admission Medications   Prescriptions Last Dose Informant   ASPIRIN 81 PO Past Week Caregiver/Healthcare Decision Maker, Outside Pharmacy/PCP   Sig: Take 81 mg by mouth daily   albuterol sulfate HFA (PROVENTIL;VENTOLIN;PROAIR) 108 (90 Base) MCG/ACT inhaler Unknown Outside Pharmacy/PCP, Caregiver/Healthcare Decision Maker   Sig: Inhale 2 puffs into the lungs every 4 hours as needed   atorvastatin (LIPITOR) 20 MG tablet Unknown Caregiver/Healthcare Decision

## 2024-03-19 NOTE — ED NOTES
Patient's breath sounds are clear, tested the patient on room air and her sats were 86%, O2 turned back to 2L NC

## 2024-03-19 NOTE — PROGRESS NOTES
IP Cardiology Consult       Date of consult:  03/19/24  Date of admission: 3/18/2024  Primary Cardiologist: DIDIER  Physician Requesting consult: Dr Coughlin    Assessment:    Problem list:   NSTEMI, probably type II versus cannot rule out type I  COPD/asthma with exacerbation  Cocaine use, last use was yesterday  History of CVA in 10/2023  Hypertension  Hyperlipidemia  Smoking  Daily alcohol use  Confusions intermittently     Not working, lives with Amaya (cousin)       Recommendations:    Presented with shortness of breath and has mildly elevated troponin.  She also used cocaine yesterday.  COPD and cocaine use is probably contributing to elevated troponin.  No chest pain.  No significant EKG abnormalities.  Will plan for stress test for risk stratification.    Continue to trend cardiac enzymes   Continue aspirin and heparin   Continue Coreg   Continue Lipitor 40 mg daily  COPD treatment per primary team  Check 2D echocardiogram  stress test for risk stratification    Discussed with cousin - Amaya, discussed about left heart catheterization and stress test, plan for stress test for now        [x]        High complexity decision making was performed      CC / Reason for consult: Shortness of breath, troponin elevation    History of the presenting illness:  Li Casanova is a 63 y.o. female with past medical history of CVA, COPD/asthma, hypertension, hyperlipidemia, smoking, cocaine use presented to hospital with dyspnea.  She denied any chest pain.  She had wheezing.  She also has history of asthma.  Last cocaine use was yesterday.  EKG was unremarkable.  Troponin is elevated.  Currently she is chest pain-free.  She also reports cough.  No fever or chills.    Past Medical History:   Diagnosis Date    Asthma     CVA (cerebral vascular accident) ([facility])     Drug abuse ([facility])     states, \"I do crack every once in a while\"       Prior to Admission medications    Medication Sig Start Date End Date Taking?

## 2024-03-19 NOTE — CONSULTS
Date of Consultation:  March 19, 2024    Referring Physician: MD Jada     Reason for Consultation:  intermittent confusion, abnormal head CT     Chief Complaint   Patient presents with    Shortness of Breath     SOB x 1 hour, BIBEMS, pt. Wheezing bilaterally with a history of asthma, patient given 10mg decadron and one neb PTA, v/s as noted, patient denies pain, hoarse voice       History of Present Illness:   Li Casanova is a 63 y.o. female with history of cocaine use disorder, hypertension, recent left MCA territory stroke with subsequent hemorrhagic transformation in October 2023, hyperlipidemia, tobacco use disorder who presents with severe shortness of breath associated with cough, found to have an NSTEMI, started on heparin drip.  Of note urine drug screen was positive for cocaine.  Patient was having some intermittent confusion this morning such as forgetting her name and therefore head CT was ordered given the fact that she has had a history of stroke and is currently on heparin drip, which showed old infarcts in the right frontal lobe and left parieto-occipital lobe with densities within the left parietal occipital infarct which were thought to be more consistent with chronic calcification however there was still suspicion for possible bleed and recommended for head CT follow-up by Dr. Muro, radiology.     Patient's prior MRI brain done with contrast at [facility] showed multiple focal areas of acute to subacute ischemic infarct in the left MCA distribution with enhancement underlying hemorrhagic transformation, possible trace subarachnoid hemorrhage in the left posterior temporoparietal convexity at the site of infarct.    Patient is on aspirin and statin at home for stroke.    She has had EEG in the past which did not show any evidence of seizures, has no known history of seizures.    Currently she denies any headaches or new neurological deficits.  She states that she is thirsty and does have a bad cough.     Tongue protrusion full and midline    Motor:   Normal tone   Drift: No evidence of pronator drift   Finger tapping equal and symmetric      Strength testing:  Antigravity in all 4 limbs.       Sensory:  Intact to light touch throughout     Reflexes:   Biceps Triceps  Brachiorad Patellar Achilles Plantar Hoffmans   Right  2 2 2 2 2 Down NT   Left  2 2 2 2 2 Down NT     Cerebellar testing:  No dysmetria.     Data:     Lab Results   Component Value Date/Time     03/19/2024 06:56 AM    K 4.3 03/19/2024 06:56 AM     03/19/2024 06:56 AM    BUN 9 03/19/2024 06:56 AM    WBC 8.9 03/19/2024 06:56 AM    HCT 35.0 03/19/2024 06:56 AM    HGB 10.6 03/19/2024 06:56 AM     03/19/2024 06:56 AM       Imaging:  Reviewed independently.    IMPRESSION/RECOMMENDATIONS:  Li Casanova is a 63 y.o. female who presented with NSTEMI, cocaine abuse, COPD exacerbation, with recent history of stroke with hemorrhagic conversion last year, now with intermittent confusion for which a head CT was performed showing findings of old infarcts in the right frontal lobe and left parieto-occipital lobe with densities within the left parieto-occipital infarct concerning for calcification and follow-up head CT was recommended to ensure that there was no new bleed.  Heparin drip has been started initially but has been held at this time.    Patient is very stable without any headaches and no focal neurological deficits other than being confused to the date which is typical and her baseline.    # Abnormal head CT, likely chronic findings, consistent with old hemorrhage.  - Attention to follow-up, would recommend repeating within 6 hours  - Monitor patient for any new focal neurological deficits, headache  -Agree with holding heparin drip and any antiplatelet therapy for now until repeat head CT is stable.      # Intermittent confusion, seems to be patient's baseline per family  - Able to answer most questions other than date which she

## 2024-03-19 NOTE — PROGRESS NOTES
Hospitalist Progress Note    Ms. Li Casanova is a 62 yo female with hx of COPD, HTN, HLD, cocaine abuse, recent ischemic stroke on 10/23 with hemorrhagic conversion, who was admitted by nocturnist MD Dr. Coughlin (see H&P @0500).  H&P reviewed and agree with assessment and plan with the following additions:  Apparently some confusion is normal for patient per family ever since her stroke, but pt could not remember her own name at one point this morning.  Given that she is on heparin gtt, has history of CVA with hemorrhagic conversion, and this seems more than her usual confusion, obtained stat CT head.  D/w Dr. Muro radiology who states that new densities within the left parieto-occipital infarct are noted and that he most likely suspects calcifications, he does have a small suspicion for new bleeding and out of an abundance of caution might suggest holding heparin gtt.  D/w cardiology Dr. COLLETTE Quinones.  Troponin has peaked in 890s now trending down.  Plan for stress test.  OK to hold heparin gtt given questionable CT findings.  D/w attending MD Dr. Elias -- given CT findings, we should initiate stroke order set and consult neurology for further evaluation and recommendations.  Have entered relevant orders for this.    Will remain available for any further needs and will continue to follow results and consults.  Please PerfectServe me Chano Barber NP.    This is a nonbillable note.

## 2024-03-19 NOTE — PROCEDURES
EEG REPORT    Patient Name: Li Casanova  : 1960  Age: 63 y.o.    Ordering physician: No referring provider defined for this encounter.  Date of EEG start time: 3/19/2024 at 1:04 PM  Date of EEG end time: 3/19/2024 at 1:37 PM  EEG procedure number: [id number]  Diagnosis: Altered mental status  Interpreting physician: Iona Masterson D.O.      Procedure: EEG    CLINICAL INDICATION: The patient is a 63 y.o. female who is being evaluated for baseline electro cerebral activities and to rule out seizure focus.      Current Facility-Administered Medications   Medication Dose Route Frequency    [Held by provider] heparin (porcine) injection 3,540 Units  60 Units/kg IntraVENous PRN    [Held by provider] heparin (porcine) injection 1,770 Units  30 Units/kg IntraVENous PRN    [Held by provider] heparin 25,000 units in dextrose 5% 250 mL (premix) infusion  5-30 Units/kg/hr IntraVENous Continuous    sodium chloride flush 0.9 % injection 5-40 mL  5-40 mL IntraVENous 2 times per day    sodium chloride flush 0.9 % injection 5-40 mL  5-40 mL IntraVENous PRN    0.9 % sodium chloride infusion   IntraVENous PRN    potassium chloride (KLOR-CON M) extended release tablet 40 mEq  40 mEq Oral PRN    Or    potassium bicarb-citric acid (EFFER-K) effervescent tablet 40 mEq  40 mEq Oral PRN    Or    potassium chloride 10 mEq/100 mL IVPB (Peripheral Line)  10 mEq IntraVENous PRN    magnesium sulfate 2000 mg in 50 mL IVPB premix  2,000 mg IntraVENous PRN    ondansetron (ZOFRAN-ODT) disintegrating tablet 4 mg  4 mg Oral Q8H PRN    Or    ondansetron (ZOFRAN) injection 4 mg  4 mg IntraVENous Q6H PRN    polyethylene glycol (GLYCOLAX) packet 17 g  17 g Oral Daily PRN    acetaminophen (TYLENOL) tablet 650 mg  650 mg Oral Q6H PRN    Or    acetaminophen (TYLENOL) suppository 650 mg  650 mg Rectal Q6H PRN    ipratropium 0.5 mg-albuterol 2.5 mg (DUONEB) nebulizer solution 1 Dose  1 Dose Inhalation Q4H PRN    methylPREDNISolone sodium succ

## 2024-03-19 NOTE — H&P
Hospitalist Admission Note    NAME:   Li Casanova   : 1960   MRN: [id number]     Date/Time: 3/19/2024 5:43 AM    Patient PCP: No primary care provider on file.    ______________________________________________________________________  Given the patient's current clinical presentation, I have a high level of concern for decompensation if discharged from the emergency department.  Complex decision making was performed, which includes reviewing the patient's available past medical records, laboratory results, and x-ray films.       My assessment of this patient's clinical condition and my plan of care is as follows.    Assessment / Plan:    Non-STEMI  Cocaine abuse  Cardiology consult  Start heparin drip  Start aspirin and statin  Start Coreg  Echocardiogram  Cardiology consult in a.m.  N.p.o. after midnight in case going for cath    COPD exacerbation  Acute hypoxic respiratory failure  Pulmonary nodules  Start Solu-Medrol  Switch Advair to budesonide/arformoterol  DuoNeb as needed  Doxycycline  Wean off oxygen  Check viral panel and procalcitonin  Repeat CT chest in 4 weeks and pulmonary follow-up for further management for pulmonary nodules and COPD    Cocaine abuse  Check drug screen    Recent stroke on 10/2023 with aphasia/metabolic extremity weakness  Hypertension/hyperlipidemia  Continue aspirin and statin  Blood pressure control  Advised to quit smoking and drug abuse      Medical Decision Making:   I personally reviewed labs: yes as below   I personally reviewed imaging reports:yes as below   Toxic drug monitoring: Monitor hemoglobin while on heparin drip  Discussed case with: ED provider. After discussion I am in agreement that acuity of patient's medical condition necessitates hospital stay.      Code Status: Full Code  Baseline: Up ad nikolay.    Subjective:   CHIEF COMPLAINT: Shortness of breath    HISTORY OF PRESENT ILLNESS:     Li Casanova is a 63 y.o.  female with PMHx significant for  has

## 2024-03-19 NOTE — ED NOTES
TRANSFER - OUT REPORT:    Verbal report given to CHANDANA Blackwood on Li Casanova  being transferred to 2210 for routine progression of patient care       Report consisted of patient's Situation, Background, Assessment and   Recommendations(SBAR).     Information from the following report(s) Nurse Handoff Report, MAR, and Cardiac Rhythm NSR  was reviewed with the receiving nurse.    [place] Fall Assessment:    Presents to emergency department  because of falls (Syncope, seizure, or loss of consciousness): No  Age > 70: No  Altered Mental Status, Intoxication with alcohol or substance confusion (Disorientation, impaired judgment, poor safety awaremess, or inability to follow instructions): No  Impaired Mobility: Ambulates or transfers with assistive devices or assistance; Unable to ambulate or transer.: Yes  Nursing Judgement: Yes          Lines:   Peripheral IV 03/18/24 Left Antecubital (Active)       Peripheral IV 03/19/24 Right Hand (Active)   Site Assessment Clean, dry & intact 03/19/24 0055   Line Status Blood return noted 03/19/24 0055   Phlebitis Assessment No symptoms 03/19/24 0055   Infiltration Assessment 0 03/19/24 0055        Opportunity for questions and clarification was provided.      Patient transported with:  Tech

## 2024-03-19 NOTE — ED NOTES
Pt stood and pivoted to BSC independently. Pt was visibly SOB while doing so, but maintained O2 sats above 95%.

## 2024-03-19 NOTE — ED PROVIDER NOTES
\[facility]\"" EMERGENCY DEPT  EMERGENCY DEPARTMENT ENCOUNTER       Pt Name: Li Casanova  MRN: [id number]  Birthdate 1960  Date of evaluation: 3/18/2024  Provider: Aubree Contreras MD   PCP: No primary care provider on file.  Note Started: 10:16 PM EDT 3/18/24     CHIEF COMPLAINT       Chief Complaint   Patient presents with    Shortness of Breath     SOB x 1 hour, BIBEMS, pt. Wheezing bilaterally with a history of asthma, patient given 10mg decadron and one neb PTA, v/s as noted, patient denies pain, hoarse voice        HISTORY OF PRESENT ILLNESS: 1 or more elements      History From: patient, ems, History limited by: none     Li Casanova is a 63 y.o. female who presents with a cc of SOB       Please See MDM for Additional Details of the HPI/PMH  Nursing Notes were all reviewed and agreed with or any disagreements were addressed in the HPI.     REVIEW OF SYSTEMS        Positives and Pertinent negatives as per HPI.    PAST HISTORY     Past Medical History:  Past Medical History:   Diagnosis Date    Asthma     Drug abuse (HCC)     states, \"I do crack every once in a while\"       Past Surgical History:  No past surgical history on file.    Family History:  No family history on file.    Social History:  Social History     Tobacco Use    Smoking status: Every Day     Current packs/day: 1.00     Types: Cigarettes    Smokeless tobacco: Never   Substance Use Topics    Alcohol use: Yes    Drug use: Yes     Types: Cocaine       Allergies:  No Known Allergies    CURRENT MEDICATIONS      Previous Medications    ALBUTEROL SULFATE HFA (PROVENTIL;VENTOLIN;PROAIR) 108 (90 BASE) MCG/ACT INHALER    Inhale 2 puffs into the lungs every 4 hours as needed    AZITHROMYCIN (ZITHROMAX) 250 MG TABLET    Take 2 tabs by mouth once and then repeat daily for 4 days    FLUTICASONE-SALMETEROL (ADVAIR DISKUS) 250-50 MCG/ACT AEPB DISKUS INHALER    Inhale 1 puff into the lungs every 12 hours       SCREENINGS               No data recorded        mg of dexamethasone.  EMS also reports that the patient did not finish the nebulizer treatment because she told them she \"did not want any more.\"  Patient states she was in her normal state of health until her symptoms began.  She denies any chest pain, abdominal pain, nausea, vomiting.    On exam, patient is afebrile, mildly tachycardic, satting 88% on room air with diffusely tight lung sounds with mild scattered wheezing.  Presentation consistent with asthma exacerbation.  Suspect tachycardia likely related to nebulizer given prehospital.  Additional differentials include pneumonia, ACS, electrolyte disturbance, anemia    Will check basic lab work to rule out severe electrolyte derangements or anemia.   Will check troponin and ekg to rule out ACS  Will check chest XR to rule out focal airspace disease/fluid overload/PTX      Problems Addressed:  COPD exacerbation (HCC): acute illness or injury  NSTEMI (non-ST elevated myocardial infarction) (HCC): acute illness or injury    Amount and/or Complexity of Data Reviewed  Labs: ordered. Decision-making details documented in ED Course.  Radiology: ordered. Decision-making details documented in ED Course.  ECG/medicine tests: ordered and independent interpretation performed. Decision-making details documented in ED Course.    Risk  OTC drugs.  Prescription drug management.  Decision regarding hospitalization.          CLINICAL MANAGEMENT TOOLS:  Not Applicable    Lab work notable for elevated troponin, otherwise no acute concerning findings.  Repeat troponin uptrending so patient started on heparin drip.  CTA without acute PE.  Discussed with Dr. Coughlin, hospitalist, for admission    ED Course as of 03/19/24 0209   Mon Mar 18, 2024   2136 EKG performed at 2130 shows sinus tachycardia with a rate of 113, no acute ischemic changes per my interpretation [PURVI]   2226 Troponin, High Sensitivity(!!): 235  Will check CTA to r/o PE given tachycardia and elevated troponin [PURVI]

## 2024-03-19 NOTE — PROGRESS NOTES
0804: Patient's troponin is 895 ng/L, ALIYA Barber notified via [employer]. Patient denies having CP.   0812: Rate verified heparin drip with pharmacy; was ordered to give 1770 unit bolus of heparin and increase the rate of heparin to 14 units/kg/hr.    0836: Cardiology consult placed to [facility] with on-call provider for NSTEMI.   1123 Pt troponin is 872 ng/dL; MD Quinones notified.     Predictive Model Details          49 (Caution)  Factor Value    Calculated 3/19/2024 17:16 23% Supplemental oxygen Nasal cannula    Deterioration Index Model 21% Age 63 years old     19% Neurological exam X     16% Respiratory rate 22     10% Pulse 113     5% Systolic 149     3% Potassium 4.3 mmol/L     2% Sodium 137 mmol/L     1% WBC count 8.9 K/uL     0% Pulse oximetry 98 %     0% Hematocrit 35.0 %     0% Temperature 97.9 °F (36.6 °C)

## 2024-03-19 NOTE — CARE COORDINATION
MILA:    RUR: 8%    CM attempted to meet with pt in room to complete initial case management assessment. Pt was asleep. CM discussed pt's discharge plan with MD and nurse in IDR at 10:00 AM. Nurse communicated that pt has been experiencing confusion since admission.     CM called pt's emergency contact, Amaya Freire [phone], to complete initial assessment. Amaya told CM that she is pt's cousin. Pt has been living with Amaya since pt's stroke in December 2023. Pt requires assistance with ADLs and does not use any DME at home (other than an inhaler). Pt previously had a nebulizer, but Amaya told CM that pt reports that the nebulizer is broken/lost. Amaya told CM that pt became frustrated while waiting for the EMTs to bring her to [facility] and broke her inhaler. Amaya said that pt's current baseline involves some confusion. This has been pt's baseline since her stroke in 2023. Amaya told CM that the doctor has told pt that she should be supervised at all times. Pt has a hx of wandering from her home and getting lost. IFEANYI communicated the information that she received about pt's baseline to pt's nurse.    Amaya said that she is able to provide pt with transportation home from the hospital if pt is discharged during the day. Aamya experiences diabetic vision loss and does not drive at night. Amaya told CM that pt has a brother and another sibling that is currently incarcerated. Amaya has been attempting to get pt set up with PCA services in the home. Amaya said that a [facility]AI assessment was recently done for pt, but she has been having a hard time finding a PCA agency that will accept [place]. IFEANYI discussed a referral to [facility]. Amaya agreeable to CM sending a referral to [facility] on pt's behalf. IFEANYI told Amaya that she is not sure if [facility] takes [place], but that she will inquire about this.    Amaya told CM that she has been asking pt for multiple years to consider

## 2024-03-20 ENCOUNTER — APPOINTMENT (OUTPATIENT)
Facility: HOSPITAL | Age: 64
End: 2024-03-20
Payer: MEDICAID

## 2024-03-20 ENCOUNTER — HOSPITAL ENCOUNTER (INPATIENT)
Facility: HOSPITAL | Age: 64
Discharge: HOME | End: 2024-03-22
Payer: MEDICAID

## 2024-03-20 ENCOUNTER — APPOINTMENT (OUTPATIENT)
Facility: HOSPITAL | Age: 64
End: 2024-03-20
Attending: GENERAL ACUTE CARE HOSPITAL
Payer: MEDICAID

## 2024-03-20 LAB
ANION GAP SERPL CALC-SCNC: 3 MMOL/L (ref 5–15)
BASOPHILS # BLD: 0 K/UL (ref 0–0.1)
BASOPHILS NFR BLD: 0 % (ref 0–1)
BUN SERPL-MCNC: 14 MG/DL (ref 6–20)
BUN/CREAT SERPL: 19 (ref 12–20)
CALCIUM SERPL-MCNC: 10.2 MG/DL (ref 8.5–10.1)
CHLORIDE SERPL-SCNC: 108 MMOL/L (ref 97–108)
CHOLEST SERPL-MCNC: 207 MG/DL
CO2 SERPL-SCNC: 25 MMOL/L (ref 21–32)
CREAT SERPL-MCNC: 0.75 MG/DL (ref 0.55–1.02)
DIFFERENTIAL METHOD BLD: ABNORMAL
ECHO BSA: 1.56 M2
EOSINOPHIL # BLD: 0 K/UL (ref 0–0.4)
EOSINOPHIL NFR BLD: 0 % (ref 0–7)
ERYTHROCYTE [DISTWIDTH] IN BLOOD BY AUTOMATED COUNT: 16.3 % (ref 11.5–14.5)
EST. AVERAGE GLUCOSE BLD GHB EST-MCNC: 82 MG/DL
GLUCOSE SERPL-MCNC: 132 MG/DL (ref 65–100)
HBA1C MFR BLD: 4.5 % (ref 4–5.6)
HCT VFR BLD AUTO: 35.4 % (ref 35–47)
HDLC SERPL-MCNC: 66 MG/DL
HDLC SERPL: 3.1 (ref 0–5)
HGB BLD-MCNC: 10.8 G/DL (ref 11.5–16)
IMM GRANULOCYTES # BLD AUTO: 0.1 K/UL (ref 0–0.04)
IMM GRANULOCYTES NFR BLD AUTO: 1 % (ref 0–0.5)
LDLC SERPL CALC-MCNC: 124.4 MG/DL (ref 0–100)
LYMPHOCYTES # BLD: 1.6 K/UL (ref 0.8–3.5)
LYMPHOCYTES NFR BLD: 8 % (ref 12–49)
MCH RBC QN AUTO: 28.8 PG (ref 26–34)
MCHC RBC AUTO-ENTMCNC: 30.5 G/DL (ref 30–36.5)
MCV RBC AUTO: 94.4 FL (ref 80–99)
MONOCYTES # BLD: 0.9 K/UL (ref 0–1)
MONOCYTES NFR BLD: 5 % (ref 5–13)
NEUTS SEG # BLD: 17.4 K/UL (ref 1.8–8)
NEUTS SEG NFR BLD: 86 % (ref 32–75)
NRBC # BLD: 0 K/UL (ref 0–0.01)
NRBC BLD-RTO: 0 PER 100 WBC
PLATELET # BLD AUTO: 369 K/UL (ref 150–400)
PMV BLD AUTO: 10.9 FL (ref 8.9–12.9)
POTASSIUM SERPL-SCNC: 4.2 MMOL/L (ref 3.5–5.1)
RBC # BLD AUTO: 3.75 M/UL (ref 3.8–5.2)
SODIUM SERPL-SCNC: 136 MMOL/L (ref 136–145)
STRESS BASELINE DIAS BP: 84 MMHG
STRESS BASELINE HR: 79 BPM
STRESS BASELINE SYS BP: 140 MMHG
STRESS ESTIMATED WORKLOAD: 1 METS
STRESS O2 SAT PEAK: 92 %
STRESS O2 SAT REST: 91 %
STRESS PEAK DIAS BP: 78 MMHG
STRESS PEAK SYS BP: 161 MMHG
STRESS PERCENT HR ACHIEVED: 73 %
STRESS POST PEAK HR: 116 BPM
STRESS RATE PRESSURE PRODUCT: NORMAL BPM*MMHG
STRESS TARGET HR: 158 BPM
TRIGL SERPL-MCNC: 83 MG/DL
VLDLC SERPL CALC-MCNC: 16.6 MG/DL
WBC # BLD AUTO: 20.1 K/UL (ref 3.6–11)

## 2024-03-20 PROCEDURE — 99233 SBSQ HOSP IP/OBS HIGH 50: CPT

## 2024-03-20 PROCEDURE — 6360000002 HC RX W HCPCS: Performed by: GENERAL ACUTE CARE HOSPITAL

## 2024-03-20 PROCEDURE — 97162 PT EVAL MOD COMPLEX 30 MIN: CPT

## 2024-03-20 PROCEDURE — 6370000000 HC RX 637 (ALT 250 FOR IP): Performed by: GENERAL ACUTE CARE HOSPITAL

## 2024-03-20 PROCEDURE — 80048 BASIC METABOLIC PNL TOTAL CA: CPT

## 2024-03-20 PROCEDURE — 6370000000 HC RX 637 (ALT 250 FOR IP)

## 2024-03-20 PROCEDURE — 6370000000 HC RX 637 (ALT 250 FOR IP): Performed by: STUDENT IN AN ORGANIZED HEALTH CARE EDUCATION/TRAINING PROGRAM

## 2024-03-20 PROCEDURE — 97166 OT EVAL MOD COMPLEX 45 MIN: CPT

## 2024-03-20 PROCEDURE — 6360000002 HC RX W HCPCS: Performed by: INTERNAL MEDICINE

## 2024-03-20 PROCEDURE — 97535 SELF CARE MNGMENT TRAINING: CPT

## 2024-03-20 PROCEDURE — 1100000003 HC PRIVATE W/ TELEMETRY

## 2024-03-20 PROCEDURE — 97116 GAIT TRAINING THERAPY: CPT

## 2024-03-20 PROCEDURE — 2500000003 HC RX 250 WO HCPCS: Performed by: GENERAL ACUTE CARE HOSPITAL

## 2024-03-20 PROCEDURE — 85025 COMPLETE CBC W/AUTO DIFF WBC: CPT

## 2024-03-20 PROCEDURE — A9500 TC99M SESTAMIBI: HCPCS | Performed by: STUDENT IN AN ORGANIZED HEALTH CARE EDUCATION/TRAINING PROGRAM

## 2024-03-20 PROCEDURE — 2700000000 HC OXYGEN THERAPY PER DAY

## 2024-03-20 PROCEDURE — 36415 COLL VENOUS BLD VENIPUNCTURE: CPT

## 2024-03-20 PROCEDURE — 83036 HEMOGLOBIN GLYCOSYLATED A1C: CPT

## 2024-03-20 PROCEDURE — 2580000003 HC RX 258: Performed by: GENERAL ACUTE CARE HOSPITAL

## 2024-03-20 PROCEDURE — 80061 LIPID PANEL: CPT

## 2024-03-20 PROCEDURE — 3430000000 HC RX DIAGNOSTIC RADIOPHARMACEUTICAL: Performed by: STUDENT IN AN ORGANIZED HEALTH CARE EDUCATION/TRAINING PROGRAM

## 2024-03-20 PROCEDURE — 78452 HT MUSCLE IMAGE SPECT MULT: CPT

## 2024-03-20 PROCEDURE — 93017 CV STRESS TEST TRACING ONLY: CPT

## 2024-03-20 PROCEDURE — 94640 AIRWAY INHALATION TREATMENT: CPT

## 2024-03-20 RX ORDER — HYDROCHLOROTHIAZIDE 25 MG/1
25 TABLET ORAL DAILY
Status: DISCONTINUED | OUTPATIENT
Start: 2024-03-20 | End: 2024-03-21 | Stop reason: HOSPADM

## 2024-03-20 RX ORDER — TRAZODONE HYDROCHLORIDE 50 MG/1
50 TABLET ORAL NIGHTLY
Status: DISCONTINUED | OUTPATIENT
Start: 2024-03-20 | End: 2024-03-21 | Stop reason: HOSPADM

## 2024-03-20 RX ORDER — ALBUTEROL SULFATE 2.5 MG/3ML
2.5 SOLUTION RESPIRATORY (INHALATION) EVERY 4 HOURS PRN
Status: DISCONTINUED | OUTPATIENT
Start: 2024-03-20 | End: 2024-03-21 | Stop reason: HOSPADM

## 2024-03-20 RX ORDER — TETRAKIS(2-METHOXYISOBUTYLISOCYANIDE)COPPER(I) TETRAFLUOROBORATE 1 MG/ML
31.4 INJECTION, POWDER, LYOPHILIZED, FOR SOLUTION INTRAVENOUS
Status: COMPLETED | OUTPATIENT
Start: 2024-03-20 | End: 2024-03-20

## 2024-03-20 RX ORDER — CETIRIZINE HYDROCHLORIDE 10 MG/1
10 TABLET ORAL DAILY
Status: DISCONTINUED | OUTPATIENT
Start: 2024-03-20 | End: 2024-03-21 | Stop reason: HOSPADM

## 2024-03-20 RX ORDER — NEPHROCAP 1 MG
1 CAP ORAL DAILY
Status: DISCONTINUED | OUTPATIENT
Start: 2024-03-20 | End: 2024-03-21 | Stop reason: HOSPADM

## 2024-03-20 RX ORDER — TETRAKIS(2-METHOXYISOBUTYLISOCYANIDE)COPPER(I) TETRAFLUOROBORATE 1 MG/ML
10.8 INJECTION, POWDER, LYOPHILIZED, FOR SOLUTION INTRAVENOUS
Status: COMPLETED | OUTPATIENT
Start: 2024-03-20 | End: 2024-03-20

## 2024-03-20 RX ORDER — REGADENOSON 0.08 MG/ML
0.4 INJECTION, SOLUTION INTRAVENOUS
Status: COMPLETED | OUTPATIENT
Start: 2024-03-20 | End: 2024-03-20

## 2024-03-20 RX ORDER — DOCUSATE SODIUM 100 MG/1
100 CAPSULE, LIQUID FILLED ORAL 2 TIMES DAILY PRN
Status: DISCONTINUED | OUTPATIENT
Start: 2024-03-20 | End: 2024-03-21 | Stop reason: HOSPADM

## 2024-03-20 RX ORDER — CLOPIDOGREL BISULFATE 75 MG/1
75 TABLET ORAL DAILY
Status: DISCONTINUED | OUTPATIENT
Start: 2024-03-20 | End: 2024-03-21 | Stop reason: HOSPADM

## 2024-03-20 RX ORDER — FOLIC ACID 1 MG/1
1 TABLET ORAL DAILY
Status: DISCONTINUED | OUTPATIENT
Start: 2024-03-20 | End: 2024-03-21 | Stop reason: HOSPADM

## 2024-03-20 RX ORDER — PREDNISONE 5 MG/1
5 TABLET ORAL DAILY
Status: DISCONTINUED | OUTPATIENT
Start: 2024-03-20 | End: 2024-03-21 | Stop reason: HOSPADM

## 2024-03-20 RX ORDER — DOXYCYCLINE HYCLATE 100 MG
100 TABLET ORAL EVERY 12 HOURS SCHEDULED
Status: DISCONTINUED | OUTPATIENT
Start: 2024-03-20 | End: 2024-03-21 | Stop reason: HOSPADM

## 2024-03-20 RX ADMIN — TETRAKIS(2-METHOXYISOBUTYLISOCYANIDE)COPPER(I) TETRAFLUOROBORATE 31.4 MILLICURIE: 1 INJECTION, POWDER, LYOPHILIZED, FOR SOLUTION INTRAVENOUS at 11:30

## 2024-03-20 RX ADMIN — SODIUM CHLORIDE, PRESERVATIVE FREE 10 ML: 5 INJECTION INTRAVENOUS at 08:45

## 2024-03-20 RX ADMIN — CARVEDILOL 6.25 MG: 6.25 TABLET, FILM COATED ORAL at 17:54

## 2024-03-20 RX ADMIN — ATORVASTATIN CALCIUM 40 MG: 40 TABLET, FILM COATED ORAL at 20:48

## 2024-03-20 RX ADMIN — DOXYCYCLINE HYCLATE 100 MG: 100 TABLET, COATED ORAL at 20:49

## 2024-03-20 RX ADMIN — NEPHROCAP 1 MG: 1 CAP ORAL at 17:55

## 2024-03-20 RX ADMIN — TETRAKIS(2-METHOXYISOBUTYLISOCYANIDE)COPPER(I) TETRAFLUOROBORATE 10.8 MILLICURIE: 1 INJECTION, POWDER, LYOPHILIZED, FOR SOLUTION INTRAVENOUS at 08:50

## 2024-03-20 RX ADMIN — TRAZODONE HYDROCHLORIDE 50 MG: 50 TABLET ORAL at 20:49

## 2024-03-20 RX ADMIN — DOXYCYCLINE 100 MG: 100 INJECTION, POWDER, LYOPHILIZED, FOR SOLUTION INTRAVENOUS at 06:06

## 2024-03-20 RX ADMIN — REGADENOSON 0.4 MG: 0.08 INJECTION, SOLUTION INTRAVENOUS at 11:28

## 2024-03-20 RX ADMIN — SODIUM CHLORIDE, PRESERVATIVE FREE 10 ML: 5 INJECTION INTRAVENOUS at 20:49

## 2024-03-20 RX ADMIN — WATER 40 MG: 1 INJECTION INTRAMUSCULAR; INTRAVENOUS; SUBCUTANEOUS at 13:33

## 2024-03-20 RX ADMIN — SERTRALINE 50 MG: 50 TABLET, FILM COATED ORAL at 17:55

## 2024-03-20 RX ADMIN — PREDNISONE 5 MG: 5 TABLET ORAL at 17:55

## 2024-03-20 RX ADMIN — CLOPIDOGREL BISULFATE 75 MG: 75 TABLET ORAL at 17:55

## 2024-03-20 RX ADMIN — ARFORMOTEROL TARTRATE: 15 SOLUTION RESPIRATORY (INHALATION) at 07:48

## 2024-03-20 RX ADMIN — CETIRIZINE HYDROCHLORIDE 10 MG: 10 TABLET, FILM COATED ORAL at 17:55

## 2024-03-20 RX ADMIN — HYDROCHLOROTHIAZIDE 25 MG: 25 TABLET ORAL at 17:55

## 2024-03-20 RX ADMIN — FOLIC ACID 1 MG: 1 TABLET ORAL at 17:55

## 2024-03-20 NOTE — PROGRESS NOTES
1615: Patient returned to IVCU from nuclear medicine for stress test.  1430: Per MD Quinones orders patient can resume diet.     Predictive Model Details          32 (Caution)  Factor Value    Calculated 3/20/2024 16:46 32% Age 63 years old    Deterioration Index Model 28% Neurological exam X     17% Respiratory rate 20     11% WBC count abnormal (20.1 K/uL)     3% Sodium 136 mmol/L     3% Systolic 133     3% Potassium 4.2 mmol/L     1% Pulse 88     0% Temperature 98.9 °F (37.2 °C)     0% Hematocrit 35.4 %     0% Pulse oximetry 95 %

## 2024-03-20 NOTE — PROGRESS NOTES
Physician Progress Note      PATIENT:               JOSE SABILLON  [facility] #:                  [id number]  :                       1960  ADMIT DATE:       3/18/2024 8:55 PM  DISCH DATE:  RESPONDING  PROVIDER #:        Sabina Rosas MD          QUERY TEXT:    63yoF patient admitted with NSTEMI. Noted documentation of acute hypoxic   respiratory failure in the H&P.  In order to support the diagnosis of acute respiratory failure, please include   additional clinical indicators in your documentation.  Or please document if   the diagnosis of acute respiratory failure has been ruled out after further   study.    The medical record reflects the following:  Risk Factors: smoker, COPD exacerbation, cocaine abuse  Clinical Indicators: presents with SOB associated with cough, noted with   wheezing, er vs 86%-88% on RA placed on 2LNC 93-95%; er nursing flowsheet resp   normal, unlabored.  H&P respiratory assessment no accessory muscle use RR 22 - 24, O2 sats 93% -   95% on 2L NC.  Treatment: duoneb, supplemental O2, IV Doxycycline, solumedrol, inhaler, pulse   oximetry.    Acute Respiratory Failure Clinical Indicators per [employer] Training Guide and   Quick Reference Guide:  pO2 < 60 mmHg or SpO2 (pulse oximetry) < 91% breathing room air  pCO2 > 50 and pH < 7.35  P/F ratio (pO2 / FIO2) < 300  pO2 decrease or pCO2 increase by 10 mmHg from baseline (if known)  Supplemental oxygen of 40% or more  Presence of respiratory distress, tachypnea, dyspnea, shortness of breath,   wheezing  Unable to speak in complete sentences  Use of accessory muscles to breathe  Extreme anxiety and feeling of impending doom  Tripod position  Confusion/altered mental status/obtunded  Options provided:  -- Acute Respiratory Failure as evidenced by, Please document evidence.  -- Acute Respiratory Failure ruled out after study  -- Other - I will add my own diagnosis  -- Disagree - Not applicable / Not valid  -- Disagree - Clinically unable to

## 2024-03-20 NOTE — PROGRESS NOTES
Physical Therapy    Orders acknowledged. Chart reviewed. Pt currently off floor for stress test. Will defer and continue to follow.    Elba Fritz, PT, DPT

## 2024-03-20 NOTE — PROGRESS NOTES
300 ng/mL  Opiates:                     300 ng/mL   Phencyclidine, PCP:           25 ng/mL  Marijuana, THC:               50 ng/mL    This screening test can identify the presence of the following drugs   when above the cutoff value; see list posted on the intranet. It can   be viewed by yazmin                           ecting in sequence the following from the IRIS home   page: "[employer] [place], Resources; [facility]   Laboratory, Physician Resources Q to Z; \"UDS (Urine Drug Screen   Automated) List of Detectable Drugs.\"     Or use web address:   [email or web address]/[place]/[facility]/Physician%20Resources/  UDS%20List%20of%20Detectable%20Drugs.pdf      Adenovirus by PCR 03/19/2024 Not detected  NOTD   Final    Coronavirus 229E by PCR 03/19/2024 Not detected  NOTD   Final    Coronavirus HKU1 by PCR 03/19/2024 Not detected  NOTD   Final    Coronavirus NL63 by PCR 03/19/2024 Not detected  NOTD   Final    Coronavirus OC43 by PCR 03/19/2024 Not detected  NOTD   Final    SARS-CoV-2, PCR 03/19/2024 Not detected  NOTD   Final    Human Metapneumovirus by PCR 03/19/2024 Not detected  NOTD   Final    Rhinovirus Enterovirus PCR 03/19/2024 Not detected  NOTD   Final    Influenza A by PCR 03/19/2024 Not detected  NOTD   Final    Influenza B PCR 03/19/2024 Not detected  NOTD   Final    Parainfluenza 1 PCR 03/19/2024 Not detected  NOTD   Final    Parainfluenza 2 PCR 03/19/2024 Not detected  NOTD   Final    Parainfluenza 3 PCR 03/19/2024 Not detected  NOTD   Final    Parainfluenza 4 PCR 03/19/2024 Not detected  NOTD   Final    Respiratory Syncytial Virus by PCR 03/19/2024 Not detected  NOTD   Final    Bordetella parapertussis by PCR 03/19/2024 Not detected  NOTD   Final    Bordetella pertussis by PCR 03/19/2024 Not detected  NOTD   Final    Chlamydophila Pneumonia PCR 03/19/2024 Not detected  NOTD   Final    Mycoplasma pneumo by PCR 03/19/2024 Not detected  NOTD   Final    Procalcitonin  Follow-up head CT is recommended.    Repeat Head CT completed on 3/19/2024: Chronic infarcts including chronic calcifications on the left. No bleed. No acute finding or change.    TTE: Pending     EEG completed on 3/19/2024, read by Dr. Masterson: This EEG, performed during wakefulness and drowsiness/sleep, is abnormal due to asymmetry with left hemispheric slowing which is suggestive of focal cerebral dysfunction such as a structural insult, which is consistent with patient's prior history of stroke.  There is no epileptiform activity or electrographic seizures. The absence of epileptiform activity neither excludes nor supports the diagnosis of epilepsy. If further suspicion persists, would recommend obtaining a continuous EEG study. Clinical correlation recommended.     Ammonia : 23    Urine drug screen: Positive for cocaine      Stroke labs:  HgBA1c  : Pending       LDL   Lab Results   Component Value Date    CHOL 207 (H) 03/20/2024    TRIG 83 03/20/2024    HDL 66 03/20/2024    LDLCALC 124.4 (H) 03/20/2024    CHOLHDLRATIO 3.1 03/20/2024          IMPRESSION:  Li Casanova is a 63 y.o. female who presents with NSTEMI, cocaine use, COPD exacerbation, history of stroke with hemorrhagic conversion last year now with intermittent confusion in which head CT was obtained and showed findings of old infarcts in the right frontal lobe and left parieto- occipital lobe with densities within the left parieto- occipital infarct concerning for calcifications.  Follow head CT was unremarkable.    Patient has been stable without any headaches and other neurological deficits with the exception of confusion to date which is typical and that is her baseline.    Repeat head CT unremarkable    EEG was abnormal, showing asymmetry with left hemispheric slowing which is suggestive of focal cerebral dysfunction such as a structural insult, which is consistent with patient's prior history of stroke. There is no epileptiform activity or

## 2024-03-20 NOTE — PROGRESS NOTES
Hospitalist Progress Note    NAME:   Li Casanova   : 1960   MRN: [id number]     Date/Time: 3/20/2024 5:08 PM  Patient PCP: Star Napier MD    Estimated discharge date: 3/21?  Barriers: cards clearance, clinical stability, leukocytosis 2/2 steroids vs infectious      Assessment / Plan:  NSTEMI  Trops peaked @ 800s  Cocaine abuse, most likely culprit for CP  Cardiology consulted, appreciate expertise  Heparin gtt started on admit, then held d/t concern for new bleeding on CT  Resume ASA, con statin  Cont Coreg  Stress test 3/20 LVEF 63%, no definite evidence of ischemia or infarction  Further workup if any per cards, if cleared may be able to DC 3/21     COPD exacerbation  Acute hypoxic respiratory failure  Pulmonary nodules  Repeat CT chest in 4 weeks and pulmonary follow-up for further management for pulmonary nodules and COPD  SoluMedrol changed to PO prednisone  Doxycycline change to PO  Cont DuoNebs as needed  Weaned to room air  Resp PCR (-), procal < 0.05, lactic 0.7  WBC was nl 8's, today 20, most likely reactive to steroids rather than infectious  Remains afebrile, no new s/s  Check CXR, procal, lactic, CBC in AM    Recent ischemic stroke on 10/2023 with hemorrhagic conversion  Residual aphasia, confusion, BLE weakness  Hypertension/hyperlipidemia  Continue aspirin and statin  Blood pressure control  Advised to quit smoking and drug abuse  Pt has intermittent confusion, at one point during this visit seemed more confused than baseline, CT was initially very slightly concerning for possible new bleeding in area of old infarct  Heparin and ASA held out of abundance of caution  Neuro eval  Repeat CT no new bleeding  EEG no seizure activity  Neuro signed off, pt seems at neurological baseline    PT/OT eval/tx    Medical Decision Making:   I personally reviewed labs: cbc, bmp, lipid panel, hgba1c, UDS, troponin  I personally reviewed imaging: CT head x2 from 3/19  I personally reviewed  Vitals for the past 24 hrs:   BP Temp Temp src Pulse Resp SpO2 Weight   03/20/24 1430 (!) 133/104 -- -- 88 -- -- --   03/20/24 1415 138/80 98.9 °F (37.2 °C) Oral 80 20 95 % --   03/20/24 1315 124/73 98 °F (36.7 °C) Oral 95 18 97 % --   03/20/24 0804 -- -- -- 95 18 96 % --   03/20/24 0749 -- -- -- 90 18 96 % --   03/20/24 0727 (!) 153/68 97.9 °F (36.6 °C) Oral 91 16 97 % --   03/20/24 0600 -- -- -- -- -- -- 57.2 kg (126 lb 1.6 oz)   03/20/24 0344 (!) 159/88 98.2 °F (36.8 °C) Oral 89 15 94 % --   03/19/24 2238 (!) 147/88 98.4 °F (36.9 °C) Oral 87 22 93 % --   03/19/24 2104 -- -- -- -- -- 97 % --   03/19/24 1933 (!) 151/95 97.7 °F (36.5 °C) Oral 87 22 99 % --   03/19/24 1724 (!) 141/77 -- -- 92 -- -- --         Intake/Output Summary (Last 24 hours) at 3/20/2024 1708  Last data filed at 3/20/2024 0709  Gross per 24 hour   Intake --   Output 900 ml   Net -900 ml        I had a face to face encounter and independently examined this patient on 3/20/2024, as outlined below:  PHYSICAL EXAM:  General: Alert, cooperative, Aox2-3  EENT:  EOMI. Anicteric sclerae.  Resp:  Diminished bilaterally, no wheezing or rales.  No accessory muscle use.  On 2L NC  CV:  Regular  rhythm,  No edema  GI:  Soft, Non distended, Non tender.  +Bowel sounds  Neurologic:  Alert and oriented X 3, normal speech,   Psych:   Fair insight. Not anxious nor agitated  Skin:  No rashes.  No jaundice    Reviewed most current lab test results and cultures  YES  Reviewed most current radiology test results   YES  Review and summation of old records today    NO  Reviewed patient's current orders and MAR    YES  PMH/SH reviewed - no change compared to H&P    Procedures: see electronic medical records for all procedures/Xrays and details which were not copied into this note but were reviewed prior to creation of Plan.      LABS:  I reviewed today's most current labs and imaging studies.  Pertinent labs include:  Recent Labs     03/18/24  2143 03/19/24  0656

## 2024-03-20 NOTE — PROGRESS NOTES
Clinical Pharmacy Note: Re: IV to PO Automatic Conversion - Antibiotic    Please note: Li Casanova’s medication(s) (Doxycyline 100 mg IV Q12h ) has/have been changed from IV to PO based on the following criteria:    The patient:  Received IV therapy for at least 24 hours   Is tolerating diet more advanced than clear liquids  Is tolerating oral medications  Is not on vasopressor blood pressure support (i.e. no signs of shock)      This IV to PO conversion is based on the P&T approved automatic conversion policy for eligible patients.  Please call with questions.

## 2024-03-20 NOTE — PROGRESS NOTES
Occupational Therapy     Chart reviewed pt and orders acknowledged. Pt currently YOLANDA for stress test. Will defer and continue to follow.     Boris Luevano, SANJUANA, OTR/L

## 2024-03-20 NOTE — PROGRESS NOTES
Progress Note      3/20/2024 12:00 PM  NAME: Li Casanova   MRN:  [id number]   Admit Diagnosis: COPD exacerbation (HCC) [J44.1]  NSTEMI (non-ST elevated myocardial infarction) (formerly [facility]) [I21.4]       Primary Cardiologist: DIDIER  Physician Requesting consult: Dr Coughlin    Assessment:    Problem list:   NSTEMI, probably type II versus cannot rule out type I  COPD/asthma with exacerbation  Cocaine use, last use was yesterday  History of CVA in 10/2023  Hypertension  Hyperlipidemia  Smoking  Daily alcohol use  Confusions intermittently       CT scan reviewed, neurology was consulted.  Appears to have history of stroke with old hemorrhage.  That is stable.    Not working, lives with Amaya (cousin)       Recommendations:    Presented with shortness of breath and has mildly elevated troponin.  She also used cocaine yesterday.  COPD and cocaine use is probably contributing to elevated troponin.  No chest pain.  No significant EKG abnormalities.  Will plan for stress test for risk stratification.    Continue aspirin and heparin   Continue Coreg   Continue Lipitor 40 mg daily  COPD treatment per primary team  Echocardiogram is pending  Stress test is ongoing    Discussed with dara Conde, discussed about left heart catheterization and stress test, plan for stress test for now        [x]        High complexity decision making was performed    Subjective:     HPI:     No chest pain or shortness of breath    ROS: No CP, SOB, Abd pain, nausea, vomiting, syncope, palpitations, new focal neurological symptoms     Objective:      Physical Exam:    Last 24hrs VS reviewed since prior progress note. Most recent are:    BP (!) 153/68   Pulse 95   Temp 97.9 °F (36.6 °C) (Oral)   Resp 18   Ht 1.499 m (4' 11\")   Wt 57.2 kg (126 lb 1.6 oz)   SpO2 96%   BMI 25.47 kg/m²     Intake/Output Summary (Last 24 hours) at 3/20/2024 1200  Last data filed at 3/20/2024 0709  Gross per 24 hour   Intake --   Output 1100 ml   Net  IntraVENous PRN    magnesium sulfate 2000 mg in 50 mL IVPB premix  2,000 mg IntraVENous PRN    ondansetron (ZOFRAN-ODT) disintegrating tablet 4 mg  4 mg Oral Q8H PRN    Or    ondansetron (ZOFRAN) injection 4 mg  4 mg IntraVENous Q6H PRN    polyethylene glycol (GLYCOLAX) packet 17 g  17 g Oral Daily PRN    acetaminophen (TYLENOL) tablet 650 mg  650 mg Oral Q6H PRN    Or    acetaminophen (TYLENOL) suppository 650 mg  650 mg Rectal Q6H PRN    ipratropium 0.5 mg-albuterol 2.5 mg (DUONEB) nebulizer solution 1 Dose  1 Dose Inhalation Q4H PRN    methylPREDNISolone sodium succ (SOLU-MEDROL) 40 mg in sterile water 1 mL injection  40 mg IntraVENous Q12H    [Held by provider] aspirin EC tablet 81 mg  81 mg Oral Daily    atorvastatin (LIPITOR) tablet 40 mg  40 mg Oral Nightly    arformoterol 15 mcg-budesonide 0.5 mg neb solution   Nebulization BID RT    doxycycline (VIBRAMYCIN) 100 mg in sodium chloride 0.9 % 100 mL IVPB (mini-bag)  100 mg IntraVENous Q12H    carvedilol (COREG) tablet 6.25 mg  6.25 mg Oral BID               Abdirizak Quinones MD

## 2024-03-20 NOTE — PLAN OF CARE
Problem: Physical Therapy - Adult  Goal: By Discharge: Performs mobility at highest level of function for planned discharge setting.  See evaluation for individualized goals.  Description: FUNCTIONAL STATUS PRIOR TO ADMISSION: Patient reports ambulating with no AD and denies any falls recently. Cousin assists with showering.    HOME SUPPORT PRIOR TO ADMISSION: The patient lived with her cousin, 1-story home with 3 steps to enter.    Physical Therapy Goals  Initiated 3/20/2024  1.  Patient will perform sit to stand with modified independence within 7 day(s).  2.  Patient will transfer from bed to chair and chair to bed with modified independence using the least restrictive device within 7 day(s).  3.  Patient will ambulate with supervision/set-up for 200 feet with the least restrictive device within 7 day(s).   4.  Patient will ascend/descend 3 stairs with 1 handrail(s) with supervision/set-up within 7 day(s).   Outcome: Not Progressing     PHYSICAL THERAPY EVALUATION    Patient: Li Casanova (63 y.o. female)  Date: 3/20/2024  Primary Diagnosis: COPD exacerbation ([facility]) [J44.1]  NSTEMI (non-ST elevated myocardial infarction) ([facility]) [I21.4]       Precautions: Restrictions/Precautions: Fall Risk                      ASSESSMENT :   DEFICITS/IMPAIRMENTS:   The patient is limited by decreased functional mobility, strength, safety awareness, balance. Patient cleared by nursing to mobilize.    Based on the impairments listed above the patient is slightly below his prior level of function. Patient knew she is in the hospital, but couldn't remember some information regarding her home set-up. Performed bed mobility with modified independence. Sit<>stand transfers with contact guard assist. Patient demonstrating increased trunk sway and unsteadiness during static standing. BP dropped significantly from sit to stand, however patient asymptomatic, recovered after ambulating. Patient ambulated around the room with no AD and  outcome/ POC  MEDIUM Complexity : 3 Standardized tests and measures addressin body structure, function, activity limitation and / or participation in recreation  MEDIUM Complexity : Evolving with changing characteristics  Tinetti Gait and Balance  HIGH    Based on the above components, the patient evaluation is determined to be of the following complexity level: Medium

## 2024-03-20 NOTE — PLAN OF CARE
Problem: Occupational Therapy - Adult  Goal: By Discharge: Performs self-care activities at highest level of function for planned discharge setting.  See evaluation for individualized goals.  Description: FUNCTIONAL STATUS PRIOR TO ADMISSION:  pt stated receiving assistance w/ bathing from her cousin due to not being able to reach her legs during bathing, pt reports independence with all other ADLs; no DME used during ambulation.    ,  ,  ,  ,  ,  ,  ,  ,  ,  , Active : No     HOME SUPPORT: Patient lived w/ cousin, Amaya.    Occupational Therapy Goals:  Initiated 3/20/2024  1.  Patient will perform grooming while standing at sink with Stand by Assist within 7 day(s).  2.  Patient will perform upper body dressing with Modified Ransom within 7 day(s).  3.  Patient will perform lower body dressing with Modified Ransom within 7 day(s).  4.  Patient will perform toilet transfers with Modified Ransom  within 7 day(s).  5.  Patient will perform all aspects of toileting with Modified Ransom within 7 day(s).  6.  Patient will participate in upper extremity therapeutic exercise/activities with Supervision for 5 minutes within 7 day(s).    7.  Patient will utilize energy conservation techniques during functional activities with verbal cues within 7 day(s).   Outcome: Progressing    OCCUPATIONAL THERAPY EVALUATION    Patient: Li Casanova (63 y.o. female)  Date: 3/20/2024  Primary Diagnosis: COPD exacerbation ([facility]) [J44.1]  NSTEMI (non-ST elevated myocardial infarction) ([facility]) [I21.4]         Precautions: Fall Risk                  ASSESSMENT :  The patient is limited by decreased independence in ADLs, high-level IADLs, ROM, strength, body mechanics, activity tolerance, endurance, safety awareness, cognition, balance, general weakness . Pt has hx of CVA (L MCA CVA w/ hemorrhage - October 2023) and recent head CT displayed old infarcts of R frontal lobe and L parieto-occipital lobe.     Based on

## 2024-03-21 ENCOUNTER — APPOINTMENT (OUTPATIENT)
Facility: HOSPITAL | Age: 64
End: 2024-03-21
Payer: MEDICAID

## 2024-03-21 ENCOUNTER — APPOINTMENT (OUTPATIENT)
Facility: HOSPITAL | Age: 64
End: 2024-03-21
Attending: GENERAL ACUTE CARE HOSPITAL
Payer: MEDICAID

## 2024-03-21 VITALS
RESPIRATION RATE: 18 BRPM | DIASTOLIC BLOOD PRESSURE: 89 MMHG | BODY MASS INDEX: 25.24 KG/M2 | SYSTOLIC BLOOD PRESSURE: 141 MMHG | HEIGHT: 59 IN | HEART RATE: 68 BPM | OXYGEN SATURATION: 97 % | TEMPERATURE: 97.9 F | WEIGHT: 125.22 LBS

## 2024-03-21 LAB
ANION GAP SERPL CALC-SCNC: 2 MMOL/L (ref 5–15)
APPEARANCE UR: CLEAR
BACTERIA URNS QL MICRO: NEGATIVE /HPF
BASOPHILS # BLD: 0 K/UL (ref 0–0.1)
BASOPHILS NFR BLD: 0 % (ref 0–1)
BILIRUB UR QL: NEGATIVE
BUN SERPL-MCNC: 17 MG/DL (ref 6–20)
BUN/CREAT SERPL: 22 (ref 12–20)
CALCIUM SERPL-MCNC: 9.9 MG/DL (ref 8.5–10.1)
CHLORIDE SERPL-SCNC: 108 MMOL/L (ref 97–108)
CO2 SERPL-SCNC: 26 MMOL/L (ref 21–32)
COLOR UR: NORMAL
CREAT SERPL-MCNC: 0.79 MG/DL (ref 0.55–1.02)
DIFFERENTIAL METHOD BLD: ABNORMAL
ECHO AV MEAN GRADIENT: 3 MMHG
ECHO AV MEAN VELOCITY: 0.9 M/S
ECHO AV PEAK GRADIENT: 6 MMHG
ECHO AV PEAK VELOCITY: 1.2 M/S
ECHO AV VELOCITY RATIO: 0.83
ECHO AV VTI: 26 CM
ECHO BSA: 1.54 M2
ECHO LV E' LATERAL VELOCITY: 5 CM/S
ECHO LV E' SEPTAL VELOCITY: 5 CM/S
ECHO LV EDV A4C: 53 ML
ECHO LV EDV INDEX A4C: 35 ML/M2
ECHO LV EJECTION FRACTION A4C: 50 %
ECHO LV ESV A4C: 26 ML
ECHO LV ESV INDEX A4C: 17 ML/M2
ECHO LV FRACTIONAL SHORTENING: 22 % (ref 28–44)
ECHO LV INTERNAL DIMENSION DIASTOLE INDEX: 2.38 CM/M2
ECHO LV INTERNAL DIMENSION DIASTOLIC: 3.6 CM (ref 3.9–5.3)
ECHO LV INTERNAL DIMENSION SYSTOLIC INDEX: 1.85 CM/M2
ECHO LV INTERNAL DIMENSION SYSTOLIC: 2.8 CM
ECHO LV IVSD: 1 CM (ref 0.6–0.9)
ECHO LV MASS 2D: 100.2 G (ref 67–162)
ECHO LV MASS INDEX 2D: 66.4 G/M2 (ref 43–95)
ECHO LV POSTERIOR WALL DIASTOLIC: 0.9 CM (ref 0.6–0.9)
ECHO LV RELATIVE WALL THICKNESS RATIO: 0.5
ECHO LVOT PEAK GRADIENT: 4 MMHG
ECHO LVOT PEAK VELOCITY: 1 M/S
ECHO PV MAX VELOCITY: 0.7 M/S
ECHO PV PEAK GRADIENT: 2 MMHG
ECHO RV FREE WALL PEAK S': 11 CM/S
ECHO RV TAPSE: 2.2 CM (ref 1.7–?)
EOSINOPHIL # BLD: 0 K/UL (ref 0–0.4)
EOSINOPHIL NFR BLD: 0 % (ref 0–7)
EPITH CASTS URNS QL MICRO: NORMAL /LPF
ERYTHROCYTE [DISTWIDTH] IN BLOOD BY AUTOMATED COUNT: 16.3 % (ref 11.5–14.5)
GLUCOSE SERPL-MCNC: 125 MG/DL (ref 65–100)
GLUCOSE UR STRIP.AUTO-MCNC: NEGATIVE MG/DL
HCT VFR BLD AUTO: 36.2 % (ref 35–47)
HGB BLD-MCNC: 11 G/DL (ref 11.5–16)
HGB UR QL STRIP: NEGATIVE
HYALINE CASTS URNS QL MICRO: NORMAL /LPF (ref 0–2)
IMM GRANULOCYTES # BLD AUTO: 0.1 K/UL (ref 0–0.04)
IMM GRANULOCYTES NFR BLD AUTO: 1 % (ref 0–0.5)
KETONES UR QL STRIP.AUTO: NEGATIVE MG/DL
LACTATE SERPL-SCNC: 0.7 MMOL/L (ref 0.4–2)
LEUKOCYTE ESTERASE UR QL STRIP.AUTO: NEGATIVE
LYMPHOCYTES # BLD: 2 K/UL (ref 0.8–3.5)
LYMPHOCYTES NFR BLD: 15 % (ref 12–49)
MCH RBC QN AUTO: 28.5 PG (ref 26–34)
MCHC RBC AUTO-ENTMCNC: 30.4 G/DL (ref 30–36.5)
MCV RBC AUTO: 93.8 FL (ref 80–99)
MONOCYTES # BLD: 0.8 K/UL (ref 0–1)
MONOCYTES NFR BLD: 6 % (ref 5–13)
NEUTS SEG # BLD: 10.4 K/UL (ref 1.8–8)
NEUTS SEG NFR BLD: 78 % (ref 32–75)
NITRITE UR QL STRIP.AUTO: NEGATIVE
NRBC # BLD: 0 K/UL (ref 0–0.01)
NRBC BLD-RTO: 0 PER 100 WBC
PH UR STRIP: 6.5 (ref 5–8)
PLATELET # BLD AUTO: 362 K/UL (ref 150–400)
PMV BLD AUTO: 10.4 FL (ref 8.9–12.9)
POTASSIUM SERPL-SCNC: 4.1 MMOL/L (ref 3.5–5.1)
PROCALCITONIN SERPL-MCNC: 0.05 NG/ML
PROT UR STRIP-MCNC: NEGATIVE MG/DL
RBC # BLD AUTO: 3.86 M/UL (ref 3.8–5.2)
RBC #/AREA URNS HPF: NORMAL /HPF (ref 0–5)
SODIUM SERPL-SCNC: 136 MMOL/L (ref 136–145)
SP GR UR REFRACTOMETRY: 1.01
URINE CULTURE IF INDICATED: NORMAL
UROBILINOGEN UR QL STRIP.AUTO: 0.2 EU/DL (ref 0.2–1)
WBC # BLD AUTO: 13.3 K/UL (ref 3.6–11)
WBC URNS QL MICRO: NORMAL /HPF (ref 0–4)

## 2024-03-21 PROCEDURE — 2580000003 HC RX 258: Performed by: GENERAL ACUTE CARE HOSPITAL

## 2024-03-21 PROCEDURE — 83605 ASSAY OF LACTIC ACID: CPT

## 2024-03-21 PROCEDURE — 93306 TTE W/DOPPLER COMPLETE: CPT

## 2024-03-21 PROCEDURE — 6370000000 HC RX 637 (ALT 250 FOR IP): Performed by: GENERAL ACUTE CARE HOSPITAL

## 2024-03-21 PROCEDURE — 80048 BASIC METABOLIC PNL TOTAL CA: CPT

## 2024-03-21 PROCEDURE — 84145 PROCALCITONIN (PCT): CPT

## 2024-03-21 PROCEDURE — 36415 COLL VENOUS BLD VENIPUNCTURE: CPT

## 2024-03-21 PROCEDURE — 81001 URINALYSIS AUTO W/SCOPE: CPT

## 2024-03-21 PROCEDURE — 6370000000 HC RX 637 (ALT 250 FOR IP): Performed by: STUDENT IN AN ORGANIZED HEALTH CARE EDUCATION/TRAINING PROGRAM

## 2024-03-21 PROCEDURE — 97116 GAIT TRAINING THERAPY: CPT

## 2024-03-21 PROCEDURE — 6360000002 HC RX W HCPCS: Performed by: GENERAL ACUTE CARE HOSPITAL

## 2024-03-21 PROCEDURE — 97535 SELF CARE MNGMENT TRAINING: CPT

## 2024-03-21 PROCEDURE — 97530 THERAPEUTIC ACTIVITIES: CPT

## 2024-03-21 PROCEDURE — 85025 COMPLETE CBC W/AUTO DIFF WBC: CPT

## 2024-03-21 PROCEDURE — 94640 AIRWAY INHALATION TREATMENT: CPT

## 2024-03-21 PROCEDURE — 6370000000 HC RX 637 (ALT 250 FOR IP)

## 2024-03-21 PROCEDURE — 71045 X-RAY EXAM CHEST 1 VIEW: CPT

## 2024-03-21 RX ORDER — AMLODIPINE BESYLATE 2.5 MG/1
2.5 TABLET ORAL DAILY
Qty: 30 TABLET | Refills: 3 | Status: SHIPPED | OUTPATIENT
Start: 2024-03-22

## 2024-03-21 RX ORDER — DOXYCYCLINE HYCLATE 100 MG
100 TABLET ORAL EVERY 12 HOURS SCHEDULED
Qty: 6 TABLET | Refills: 0 | Status: SHIPPED | OUTPATIENT
Start: 2024-03-21 | End: 2024-03-24

## 2024-03-21 RX ORDER — ISOSORBIDE MONONITRATE 30 MG/1
30 TABLET, EXTENDED RELEASE ORAL DAILY
Status: DISCONTINUED | OUTPATIENT
Start: 2024-03-21 | End: 2024-03-21

## 2024-03-21 RX ORDER — PREDNISONE 5 MG/1
5 TABLET ORAL DAILY
Qty: 1 TABLET | Refills: 0 | Status: SHIPPED | OUTPATIENT
Start: 2024-03-22 | End: 2024-03-23

## 2024-03-21 RX ORDER — NEPHROCAP 1 MG
1 CAP ORAL DAILY
Qty: 30 CAPSULE | Refills: 0 | Status: SHIPPED | OUTPATIENT
Start: 2024-03-22

## 2024-03-21 RX ORDER — CARVEDILOL 6.25 MG/1
6.25 TABLET ORAL 2 TIMES DAILY WITH MEALS
Qty: 60 TABLET | Refills: 3 | Status: SHIPPED | OUTPATIENT
Start: 2024-03-21

## 2024-03-21 RX ORDER — BUDESONIDE AND FORMOTEROL FUMARATE DIHYDRATE 80; 4.5 UG/1; UG/1
2 AEROSOL RESPIRATORY (INHALATION) 2 TIMES DAILY
Qty: 10.2 G | Refills: 3 | Status: SHIPPED | OUTPATIENT
Start: 2024-03-21

## 2024-03-21 RX ORDER — AMLODIPINE BESYLATE 5 MG/1
2.5 TABLET ORAL DAILY
Status: DISCONTINUED | OUTPATIENT
Start: 2024-03-21 | End: 2024-03-21 | Stop reason: HOSPADM

## 2024-03-21 RX ORDER — ATORVASTATIN CALCIUM 40 MG/1
40 TABLET, FILM COATED ORAL NIGHTLY
Qty: 30 TABLET | Refills: 3 | Status: SHIPPED | OUTPATIENT
Start: 2024-03-21

## 2024-03-21 RX ADMIN — AMLODIPINE BESYLATE 2.5 MG: 5 TABLET ORAL at 08:30

## 2024-03-21 RX ADMIN — CARVEDILOL 6.25 MG: 6.25 TABLET, FILM COATED ORAL at 08:31

## 2024-03-21 RX ADMIN — DOXYCYCLINE HYCLATE 100 MG: 100 TABLET, COATED ORAL at 08:31

## 2024-03-21 RX ADMIN — FOLIC ACID 1 MG: 1 TABLET ORAL at 08:31

## 2024-03-21 RX ADMIN — SODIUM CHLORIDE, PRESERVATIVE FREE 10 ML: 5 INJECTION INTRAVENOUS at 08:31

## 2024-03-21 RX ADMIN — CETIRIZINE HYDROCHLORIDE 10 MG: 10 TABLET, FILM COATED ORAL at 08:31

## 2024-03-21 RX ADMIN — ASPIRIN 81 MG: 81 TABLET, COATED ORAL at 08:31

## 2024-03-21 RX ADMIN — HYDROCHLOROTHIAZIDE 25 MG: 25 TABLET ORAL at 08:31

## 2024-03-21 RX ADMIN — NEPHROCAP 1 MG: 1 CAP ORAL at 08:31

## 2024-03-21 RX ADMIN — SERTRALINE 50 MG: 50 TABLET, FILM COATED ORAL at 08:31

## 2024-03-21 RX ADMIN — CLOPIDOGREL BISULFATE 75 MG: 75 TABLET ORAL at 08:31

## 2024-03-21 RX ADMIN — ARFORMOTEROL TARTRATE: 15 SOLUTION RESPIRATORY (INHALATION) at 08:55

## 2024-03-21 RX ADMIN — PREDNISONE 5 MG: 5 TABLET ORAL at 08:31

## 2024-03-21 NOTE — CARE COORDINATION
Transition of Care Plan:    RUR: 10%  Prior Level of Functioning: Needs some assistance due to confusion  Disposition: Home with Home Health Services  If SNF or IPR: Date FOC offered: N/A  Follow up appointments: PCP and specialists as recommended  DME needed: Nebulizer and inhaler  Transportation at discharge: [facility] Avalos [place], [phone]   IM/IMM Medicare/ letter given: N/A - pt has Medicaid  Is patient a [place] and connected with [facility]? No   If yes, was  transfer form completed and VA notified? N/A  Caregiver Contact: Amaya Avalos, [phone]  Discharge Caregiver contacted prior to discharge? Yes  Care Conference needed? Not at this time  Barriers to discharge:  None    CM called pt's Amaya avalos, to discuss pt's discharge plan. CM informed Amaya that pt is most likely being discharged today. Amaya confirmed that she will provide pt with transportation home from the hospital and requested that the medical team contact her when pt is ready for discharge.    CM discussed PT/OT recommendations for home health for pt. Amaya agreeable to home health referrals being sent for pt. CM sent referrals to 12 home health agencies for pt via [employer]. Home health referral sent to [facility] through [employer].    As discussed with Amaya on 3/19, CM confirmed that a referral was sent to [facility] for pt via [employer] to assist in getting pt set up with Medicaid PCA services.    CM informed Amaya that referrals will be sent to order a new nebulizer machine for pt. CM explained to Amaya that the orders may not be approved by pt's insurance if her previous nebulizer was provided within the past 5 years. Amaya expressed understanding of this. CM sent DME referrals to [facility] via [employer].    Amaya does not have any other questions or concerns regarding pt's discharge plan.     11:55 AM: Nebulizer order approved by

## 2024-03-21 NOTE — PLAN OF CARE
Problem: Occupational Therapy - Adult  Goal: By Discharge: Performs self-care activities at highest level of function for planned discharge setting.  See evaluation for individualized goals.  Description: FUNCTIONAL STATUS PRIOR TO ADMISSION:  pt stated receiving assistance w/ bathing from her cousin due to not being able to reach her legs during bathing, pt reports independence with all other ADLs; no DME used during ambulation.    ,  ,  ,  ,  ,  ,  ,  ,  ,  , Active : No     HOME SUPPORT: Patient lived w/ cousin, Amaya.    Occupational Therapy Goals:  Initiated 3/20/2024  1.  Patient will perform grooming while standing at sink with Stand by Assist within 7 day(s).  2.  Patient will perform upper body dressing with Modified Harmon within 7 day(s).  3.  Patient will perform lower body dressing with Modified Harmon within 7 day(s).  4.  Patient will perform toilet transfers with Modified Harmon  within 7 day(s).  5.  Patient will perform all aspects of toileting with Modified Harmon within 7 day(s).  6.  Patient will participate in upper extremity therapeutic exercise/activities with Supervision for 5 minutes within 7 day(s).    7.  Patient will utilize energy conservation techniques during functional activities with verbal cues within 7 day(s).   Outcome: Not Progressing    OCCUPATIONAL THERAPY TREATMENT  Patient: Li Casanova (63 y.o. female)  Date: 3/21/2024  Primary Diagnosis: COPD exacerbation ([facility]) [J44.1]  NSTEMI (non-ST elevated myocardial infarction) ([facility]) [I21.4]       Precautions: Fall Risk                Chart, occupational therapy assessment, plan of care, and goals were reviewed.    ASSESSMENT  Patient continues to benefit from skilled OT services and is slowly progressing towards goals. Pt received supine in bed, sleeping, but easily awoken to reporting OT voice. Pt very drowsy throughout session. Pt completed bed mobility with mod I and completed functional

## 2024-03-21 NOTE — CARDIO/PULMONARY
Chart Reviewed. Pt is a 63 y.o. female admitted with COPD exacerbation ([facility]) [J44.1]  NSTEMI (non-ST elevated myocardial infarction) ([facility]) [I21.4].    Per MD notes: \"NSTEMI, probably type II versus cannot rule out type I\"   Stress test on 3/20/24 - no ischemia  LVEF 63%  Pt with daily alcohol use  Smokes 1  cigarette pack/day  \"Occasional\" cocaine use  Pt with confusion intermittently.    Pt is not a good candidate for cardiac rehab at this time.

## 2024-03-21 NOTE — DISCHARGE INSTRUCTIONS
Smoking Cessation Program: This is a free, phone/web based, smoking cessation program. The program is individualized to meet each patient's needs. To enroll use the link - www.quitnow[place].org or call 0-128-DHNWMCH (1.449.217.5055) .Smoking Cessation Program: This is a free, phone/web based, smoking cessation program. The program is individualized to meet each patient's needs. To enroll use the link - www.quitnow[place].org or call 6-843-CZAJDTI ([phone]) .

## 2024-03-21 NOTE — DISCHARGE SUMMARY
doses     predniSONE 5 MG tablet  Commonly known as: DELTASONE  Take 1 tablet by mouth daily for 1 dose  Start taking on: March 22, 2024     Virt-Caps 1 MG Caps  Take 1 capsule by mouth daily  Start taking on: March 22, 2024            CHANGE how you take these medications      ASPIRIN 81 PO  What changed: Another medication with the same name was removed. Continue taking this medication, and follow the directions you see here.     atorvastatin 40 MG tablet  Commonly known as: LIPITOR  Take 1 tablet by mouth nightly  What changed:   medication strength  how much to take  when to take this            CONTINUE taking these medications      albuterol sulfate  (90 Base) MCG/ACT inhaler  Commonly known as: PROVENTIL;VENTOLIN;PROAIR     budesonide-formoterol 80-4.5 MCG/ACT Aero  Commonly known as: Symbicort  Inhale 2 puffs into the lungs 2 times daily     cetirizine 10 MG tablet  Commonly known as: ZYRTEC     clopidogrel 75 MG tablet  Commonly known as: PLAVIX     docusate sodium 100 MG capsule  Commonly known as: COLACE     ferrous sulfate 325 (65 Fe) MG EC tablet  Commonly known as: FE TABS 325     folic acid 1 MG tablet  Commonly known as: FOLVITE     hydroCHLOROthiazide 25 MG tablet  Commonly known as: HYDRODIURIL     sertraline 50 MG tablet  Commonly known as: ZOLOFT     traZODone 50 MG tablet  Commonly known as: DESYREL     Vitron-C  MG Tabs  Generic drug: iron-vitamin C            STOP taking these medications      Advair Diskus 250-50 MCG/ACT Aepb diskus inhaler  Generic drug: fluticasone-salmeterol     azithromycin 250 MG tablet  Commonly known as: ZITHROMAX               Where to Get Your Medications        These medications were sent to [id number] - [place], [place] - [address] - P [phone] - F [phone]  [facility] [facility] [address]      Phone: [phone]   amLODIPine 2.5 MG tablet  atorvastatin 40 MG tablet  budesonide-formoterol 80-4.5 MCG/ACT  Aero  carvedilol 6.25 MG tablet  Compressor/Nebulizer Misc  doxycycline hyclate 100 MG tablet  predniSONE 5 MG tablet  Virt-Caps 1 MG Caps             DISPOSITION:    Home with Family:  x     Home with HH/PT/OT/RN:    SNF/LTC:    JOSSELYN:    OTHER:            Code status: Full Code  Recommended diet: cardiac diet  Recommended activity: activity as tolerated  Wound care: None      Follow up with:   PCP : Star Napier MD Brown, Leon J, MD  [address] [place] [address] [phone]    Follow up      Abdirizak Quinones MD  [address] [place] [id number] [phone]    Follow up in 2 week(s)      [employer]  Address: [address] [place] [address]    Phone: [phone]  Follow up  If you have any questions or concerns regarding your nebulizer, please contact [employer] at the number listed above.          Total time in minutes spent coordinating this discharge (includes going over instructions, follow-up, prescriptions, and preparing report for sign off to her PCP) :  35 minutes

## 2024-03-21 NOTE — PROGRESS NOTES
Progress Note      3/20/2024 12:00 PM  NAME: Li Casanova   MRN:  [id number]   Admit Diagnosis: COPD exacerbation (HCC) [J44.1]  NSTEMI (non-ST elevated myocardial infarction) ([facility]) [I21.4]       Primary Cardiologist: DIDIER  Physician Requesting consult: Dr Coughlin    Assessment:    Problem list:   NSTEMI, probably type II versus cannot rule out type I  COPD/asthma with exacerbation  Cocaine use, last use was yesterday  History of CVA in 10/2023  Hypertension  Hyperlipidemia  Smoking  Daily alcohol use  Confusions intermittently       CT scan reviewed, neurology was consulted.  Appears to have history of stroke with old hemorrhage.  That is stable.    Not working, lives with Amaya (cousin)    Stress test in 3/20/24 - no ischemia        Recommendations:    Continue aspirin  Continue Coreg   Added Amlodipine for elevated Bp  cONT HCTZ  Continue Lipitor 40 mg daily  COPD treatment per primary team   Stress test okay   Discussed about not using cocaine     No further cardiac work up   Okay to be discharged from cardiac standpoint          [x]        High complexity decision making was performed    Subjective:     HPI:     No chest pain or shortness of breath  Stress test okay       ROS: No CP, SOB, Abd pain, nausea, vomiting, syncope, palpitations, new focal neurological symptoms     Objective:      Physical Exam:    Last 24hrs VS reviewed since prior progress note. Most recent are:    BP (!) 153/68   Pulse 95   Temp 97.9 °F (36.6 °C) (Oral)   Resp 18   Ht 1.499 m (4' 11\")   Wt 57.2 kg (126 lb 1.6 oz)   SpO2 96%   BMI 25.47 kg/m²     Intake/Output Summary (Last 24 hours) at 3/20/2024 1200  Last data filed at 3/20/2024 0709  Gross per 24 hour   Intake --   Output 1100 ml   Net -1100 ml         General: Alert and oriented x3, no acute distress   Neck: Supple   Respiratory: No respiratory distress, clear lung sound   Cardiovascular: Regular rate rhythm, S1S2, no murmur   Abdomen: soft, non tender,  IntraVENous Q6H PRN    polyethylene glycol (GLYCOLAX) packet 17 g  17 g Oral Daily PRN    acetaminophen (TYLENOL) tablet 650 mg  650 mg Oral Q6H PRN    Or    acetaminophen (TYLENOL) suppository 650 mg  650 mg Rectal Q6H PRN    ipratropium 0.5 mg-albuterol 2.5 mg (DUONEB) nebulizer solution 1 Dose  1 Dose Inhalation Q4H PRN    methylPREDNISolone sodium succ (SOLU-MEDROL) 40 mg in sterile water 1 mL injection  40 mg IntraVENous Q12H    [Held by provider] aspirin EC tablet 81 mg  81 mg Oral Daily    atorvastatin (LIPITOR) tablet 40 mg  40 mg Oral Nightly    arformoterol 15 mcg-budesonide 0.5 mg neb solution   Nebulization BID RT    doxycycline (VIBRAMYCIN) 100 mg in sodium chloride 0.9 % 100 mL IVPB (mini-bag)  100 mg IntraVENous Q12H    carvedilol (COREG) tablet 6.25 mg  6.25 mg Oral BID               Abdirizak Quinones MD

## 2024-03-21 NOTE — PROGRESS NOTES
Physician Progress Note      PATIENT:               JOSE SABILLON  CSN #:                  [id number]  :                       1960  ADMIT DATE:       3/18/2024 8:55 PM  DISCH DATE:  RESPONDING  PROVIDER #:        Chano Barber NP          QUERY TEXT:    63yoF patient admitted with chest pain and elevated troponin @ 235-> 791->   895->872. CArdiology noted NSTEMI probably type II vs cannot rule out type I.   If possible, please document in the progress notes and discharge summary if   you are evaluating and/or treating any of the following:    The medical record reflects the following:  Risk Factors: cocaine use, HTN smoking, alcohol use, COPD/asthma with   exacerbation  Clinical Indicators: 'Presented with shortness of breath and has mildly   elevated troponin; used cocaine yesterday.  COPD and cocaine use is probably   contributing to elevated troponin.'  noted on 3/20 'Stress test 3/20 LVEF 63%, no definite evidence of ischemia or   infarction.'  Treatment: trend troponin, Cardiology consult, Heparin, continue aspirin,   statin, Coreg, stress test, neb tx, echocardiogram, supplemental O2, duonebs,   advised to quit smoking and drug abuse.    Thank you,  Elizabeth Simons RN, CDI  Options provided:  -- NSTEMI  -- Type 2 MI  -- Demand Ischemia with MI  -- Demand Ischemia only, no MI  -- Other - I will add my own diagnosis  -- Disagree - Not applicable / Not valid  -- Disagree - Clinically unable to determine / Unknown  -- Refer to Clinical Documentation Reviewer    PROVIDER RESPONSE TEXT:    This patient has an NSTEMI.    Query created by: Elizabeth Simons on 3/20/2024 5:41 PM      Electronically signed by:  Chano Barber NP 3/21/2024 12:53 PM

## 2024-03-21 NOTE — PLAN OF CARE
Problem: Physical Therapy - Adult  Goal: By Discharge: Performs mobility at highest level of function for planned discharge setting.  See evaluation for individualized goals.  Description: FUNCTIONAL STATUS PRIOR TO ADMISSION: Patient reports ambulating with no AD and denies any falls recently. Cousin assists with showering.    HOME SUPPORT PRIOR TO ADMISSION: The patient lived with her cousin, 1-story home with 3 steps to enter.    Physical Therapy Goals  Initiated 3/20/2024  1.  Patient will perform sit to stand with modified independence within 7 day(s).  2.  Patient will transfer from bed to chair and chair to bed with modified independence using the least restrictive device within 7 day(s).  3.  Patient will ambulate with supervision/set-up for 200 feet with the least restrictive device within 7 day(s).   4.  Patient will ascend/descend 3 stairs with 1 handrail(s) with supervision/set-up within 7 day(s).   Outcome: Progressing     PHYSICAL THERAPY TREATMENT    Patient: Li Casanova (63 y.o. female)  Date: 3/21/2024  Diagnosis: COPD exacerbation ([facility]) [J44.1]  NSTEMI (non-ST elevated myocardial infarction) ([facility]) [I21.4] NSTEMI (non-ST elevated myocardial infarction) ([facility])      Precautions: Fall Risk                      ASSESSMENT:  Patient continues to benefit from skilled PT services and is progressing towards goals. Received patient sitting up in chair, agreeable to mobilize. Performed sit<>stand transfer with contact guard assist. Upon initial standing patient was stretching backward and lost her balance, able to recover on her own but provided contact guard assist for fall prevention. Ambulated 150' using no AD and contact guard assist due to mild trunk sway and unsteadiness. No report of dizziness during mobility, vitals stable. At end of session patient was left sitting up in chair with chair alarm activated, call bell within reach, no complaints.         PLAN:  Patient continues to benefit from  assistance  Distance (ft): 150 Feet  Assistive Device: Gait belt  Base of Support: Widened  Speed/Karin: Fluctuations  Step Length: Left shortened;Right shortened  Gait Abnormalities: Altered arm swing;Decreased step clearance;Ataxic;Trunk sway increased    Pain Ratin/10     Activity Tolerance:   Good    After treatment:   Patient left in no apparent distress sitting up in chair, Call bell within reach, and Bed/ chair alarm activated      COMMUNICATION/EDUCATION:   The patient's plan of care was discussed with: registered nurse    Patient Education  Education Given To: Patient  Education Provided: Fall Prevention Strategies  Education Method: Verbal  Barriers to Learning: None  Education Outcome: Continued education needed      Elba Fritz, PT  Minutes: 10

## 2024-03-21 NOTE — PLAN OF CARE
Problem: Occupational Therapy - Adult  Goal: By Discharge: Performs self-care activities at highest level of function for planned discharge setting.  See evaluation for individualized goals.  Description: FUNCTIONAL STATUS PRIOR TO ADMISSION:  pt stated receiving assistance w/ bathing from her cousin due to not being able to reach her legs during bathing, pt reports independence with all other ADLs; no DME used during ambulation.    ,  ,  ,  ,  ,  ,  ,  ,  ,  , Active : No     HOME SUPPORT: Patient lived w/ [facility].    Occupational Therapy Goals:  Initiated 3/20/2024  1.  Patient will perform grooming while standing at sink with Stand by Assist within 7 day(s).  2.  Patient will perform upper body dressing with Modified [place] within 7 day(s).  3.  Patient will perform lower body dressing with Modified [place] within 7 day(s).  4.  Patient will perform toilet transfers with Modified [place]  within 7 day(s).  5.  Patient will perform all aspects of toileting with Modified [place] within 7 day(s).  6.  Patient will participate in upper extremity therapeutic exercise/activities with Supervision for 5 minutes within 7 day(s).    7.  Patient will utilize energy conservation techniques during functional activities with verbal cues within 7 day(s).   3/21/2024 1118 by Boris Luevano, JANEY  Outcome: Not Progressing     Problem: Occupational Therapy - Adult  Goal: By Discharge: Performs self-care activities at highest level of function for planned discharge setting.  See evaluation for individualized goals.  Description: FUNCTIONAL STATUS PRIOR TO ADMISSION:  pt stated receiving assistance w/ bathing from her cousin due to not being able to reach her legs during bathing, pt reports independence with all other ADLs; no DME used during ambulation.    ,  ,  ,  ,  ,  ,  ,  ,  ,  , Active : No     HOME SUPPORT: Patient lived w/ [facility].    Occupational Therapy Goals:  Initiated

## 2024-03-21 NOTE — PROGRESS NOTES
9:00 AM   Predictive Model Details          30  Factor Value    Calculated 3/21/2024 09:00 35% Age 63 years old    Deterioration Index Model 31% Neurological exam X     10% WBC count abnormal (13.3 K/uL)     10% Systolic 155     8% Respiratory rate 18     4% Sodium 136 mmol/L     1% Pulse 88     1% Pulse oximetry 94 %     1% Potassium 4.1 mmol/L     0% Hematocrit 36.2 %     0% Temperature 98.1 °F (36.7 °C)      11:21 AM Patient working with PT/OT this morning. Tolerating well, standby assist in the hallway. Coordinated with Case Management to inform the home care giver of an appropriate discharge time.     1:52 PM Pt ready for discharge. RN reviewed all new medications and where to  the prescriptions. RN reviewed how to follow up with physicians. Pt verbalized understanding. A copy of discharge instructions also given to care giverAmaya. RN provided time to answer any and all questions.

## 2024-04-20 ENCOUNTER — APPOINTMENT (OUTPATIENT)
Facility: HOSPITAL | Age: 64
End: 2024-04-20
Payer: MEDICAID

## 2024-04-20 ENCOUNTER — HOSPITAL ENCOUNTER (INPATIENT)
Facility: HOSPITAL | Age: 64
LOS: 5 days | Discharge: HOME | End: 2024-04-25
Attending: STUDENT IN AN ORGANIZED HEALTH CARE EDUCATION/TRAINING PROGRAM | Admitting: STUDENT IN AN ORGANIZED HEALTH CARE EDUCATION/TRAINING PROGRAM
Payer: MEDICAID

## 2024-04-20 DIAGNOSIS — J45.41 MODERATE PERSISTENT ASTHMA WITH EXACERBATION: Primary | ICD-10-CM

## 2024-04-20 DIAGNOSIS — R68.89 INCREASED OXYGEN DEMAND: ICD-10-CM

## 2024-04-20 PROBLEM — J44.9 COPD (CHRONIC OBSTRUCTIVE PULMONARY DISEASE) (HCC): Status: ACTIVE | Noted: 2024-04-20

## 2024-04-20 LAB
ALBUMIN SERPL-MCNC: 3.9 G/DL (ref 3.5–5)
ALBUMIN/GLOB SERPL: 1.1 (ref 1.1–2.2)
ALP SERPL-CCNC: 74 U/L (ref 45–117)
ALT SERPL-CCNC: 34 U/L (ref 12–78)
ANION GAP SERPL CALC-SCNC: 6 MMOL/L (ref 5–15)
AST SERPL-CCNC: 29 U/L (ref 15–37)
BASOPHILS # BLD: 0.1 K/UL (ref 0–0.1)
BASOPHILS NFR BLD: 1 % (ref 0–1)
BILIRUB SERPL-MCNC: 0.2 MG/DL (ref 0.2–1)
BUN SERPL-MCNC: 12 MG/DL (ref 6–20)
BUN/CREAT SERPL: 15 (ref 12–20)
CALCIUM SERPL-MCNC: 9.7 MG/DL (ref 8.5–10.1)
CHLORIDE SERPL-SCNC: 107 MMOL/L (ref 97–108)
CO2 SERPL-SCNC: 24 MMOL/L (ref 21–32)
CREAT SERPL-MCNC: 0.82 MG/DL (ref 0.55–1.02)
DIFFERENTIAL METHOD BLD: ABNORMAL
EKG ATRIAL RATE: 106 BPM
EKG DIAGNOSIS: NORMAL
EKG P AXIS: 56 DEGREES
EKG P-R INTERVAL: 140 MS
EKG Q-T INTERVAL: 344 MS
EKG QRS DURATION: 66 MS
EKG QTC CALCULATION (BAZETT): 456 MS
EKG R AXIS: 16 DEGREES
EKG T AXIS: 5 DEGREES
EKG VENTRICULAR RATE: 106 BPM
EOSINOPHIL # BLD: 0.1 K/UL (ref 0–0.4)
EOSINOPHIL NFR BLD: 1 % (ref 0–7)
ERYTHROCYTE [DISTWIDTH] IN BLOOD BY AUTOMATED COUNT: 16.5 % (ref 11.5–14.5)
GLOBULIN SER CALC-MCNC: 3.7 G/DL (ref 2–4)
GLUCOSE SERPL-MCNC: 119 MG/DL (ref 65–100)
HCT VFR BLD AUTO: 31.8 % (ref 35–47)
HGB BLD-MCNC: 9.4 G/DL (ref 11.5–16)
IMM GRANULOCYTES # BLD AUTO: 0 K/UL (ref 0–0.04)
IMM GRANULOCYTES NFR BLD AUTO: 0 % (ref 0–0.5)
LYMPHOCYTES # BLD: 1.3 K/UL (ref 0.8–3.5)
LYMPHOCYTES NFR BLD: 13 % (ref 12–49)
MAGNESIUM SERPL-MCNC: 2.2 MG/DL (ref 1.6–2.4)
MCH RBC QN AUTO: 26.6 PG (ref 26–34)
MCHC RBC AUTO-ENTMCNC: 29.6 G/DL (ref 30–36.5)
MCV RBC AUTO: 89.8 FL (ref 80–99)
MONOCYTES # BLD: 0.7 K/UL (ref 0–1)
MONOCYTES NFR BLD: 7 % (ref 5–13)
NEUTS SEG # BLD: 8 K/UL (ref 1.8–8)
NEUTS SEG NFR BLD: 78 % (ref 32–75)
NRBC # BLD: 0 K/UL (ref 0–0.01)
NRBC BLD-RTO: 0 PER 100 WBC
NT PRO BNP: 48 PG/ML
PLATELET # BLD AUTO: 299 K/UL (ref 150–400)
PMV BLD AUTO: 10.4 FL (ref 8.9–12.9)
POTASSIUM SERPL-SCNC: 3.9 MMOL/L (ref 3.5–5.1)
PROT SERPL-MCNC: 7.6 G/DL (ref 6.4–8.2)
RBC # BLD AUTO: 3.54 M/UL (ref 3.8–5.2)
SODIUM SERPL-SCNC: 137 MMOL/L (ref 136–145)
TROPONIN I SERPL HS-MCNC: 13 NG/L (ref 0–51)
WBC # BLD AUTO: 10.1 K/UL (ref 3.6–11)

## 2024-04-20 PROCEDURE — 6370000000 HC RX 637 (ALT 250 FOR IP): Performed by: STUDENT IN AN ORGANIZED HEALTH CARE EDUCATION/TRAINING PROGRAM

## 2024-04-20 PROCEDURE — 96365 THER/PROPH/DIAG IV INF INIT: CPT

## 2024-04-20 PROCEDURE — 2580000003 HC RX 258

## 2024-04-20 PROCEDURE — 36415 COLL VENOUS BLD VENIPUNCTURE: CPT

## 2024-04-20 PROCEDURE — 6370000000 HC RX 637 (ALT 250 FOR IP)

## 2024-04-20 PROCEDURE — 71275 CT ANGIOGRAPHY CHEST: CPT

## 2024-04-20 PROCEDURE — 85025 COMPLETE CBC W/AUTO DIFF WBC: CPT

## 2024-04-20 PROCEDURE — 94640 AIRWAY INHALATION TREATMENT: CPT

## 2024-04-20 PROCEDURE — 2580000003 HC RX 258: Performed by: STUDENT IN AN ORGANIZED HEALTH CARE EDUCATION/TRAINING PROGRAM

## 2024-04-20 PROCEDURE — 96374 THER/PROPH/DIAG INJ IV PUSH: CPT

## 2024-04-20 PROCEDURE — 83735 ASSAY OF MAGNESIUM: CPT

## 2024-04-20 PROCEDURE — 6360000002 HC RX W HCPCS

## 2024-04-20 PROCEDURE — 1100000000 HC RM PRIVATE

## 2024-04-20 PROCEDURE — 6360000002 HC RX W HCPCS: Performed by: STUDENT IN AN ORGANIZED HEALTH CARE EDUCATION/TRAINING PROGRAM

## 2024-04-20 PROCEDURE — 80053 COMPREHEN METABOLIC PANEL: CPT

## 2024-04-20 PROCEDURE — 83880 ASSAY OF NATRIURETIC PEPTIDE: CPT

## 2024-04-20 PROCEDURE — 99285 EMERGENCY DEPT VISIT HI MDM: CPT

## 2024-04-20 PROCEDURE — 84484 ASSAY OF TROPONIN QUANT: CPT

## 2024-04-20 PROCEDURE — 6360000004 HC RX CONTRAST MEDICATION

## 2024-04-20 RX ORDER — IPRATROPIUM BROMIDE AND ALBUTEROL SULFATE 2.5; .5 MG/3ML; MG/3ML
1 SOLUTION RESPIRATORY (INHALATION)
Status: DISCONTINUED | OUTPATIENT
Start: 2024-04-20 | End: 2024-04-22

## 2024-04-20 RX ORDER — DEXAMETHASONE SODIUM PHOSPHATE 10 MG/ML
6 INJECTION, SOLUTION INTRAMUSCULAR; INTRAVENOUS ONCE
Status: DISCONTINUED | OUTPATIENT
Start: 2024-04-20 | End: 2024-04-20

## 2024-04-20 RX ORDER — BUDESONIDE AND FORMOTEROL FUMARATE DIHYDRATE 80; 4.5 UG/1; UG/1
2 AEROSOL RESPIRATORY (INHALATION) 2 TIMES DAILY
Status: DISCONTINUED | OUTPATIENT
Start: 2024-04-20 | End: 2024-04-20 | Stop reason: CLARIF

## 2024-04-20 RX ORDER — DEXAMETHASONE SODIUM PHOSPHATE 10 MG/ML
10 INJECTION, SOLUTION INTRAMUSCULAR; INTRAVENOUS ONCE
Status: COMPLETED | OUTPATIENT
Start: 2024-04-20 | End: 2024-04-20

## 2024-04-20 RX ORDER — CLOPIDOGREL BISULFATE 75 MG/1
75 TABLET ORAL DAILY
Status: DISCONTINUED | OUTPATIENT
Start: 2024-04-20 | End: 2024-04-25 | Stop reason: HOSPADM

## 2024-04-20 RX ORDER — ACETAMINOPHEN 325 MG/1
650 TABLET ORAL EVERY 8 HOURS PRN
Status: DISCONTINUED | OUTPATIENT
Start: 2024-04-20 | End: 2024-04-25 | Stop reason: HOSPADM

## 2024-04-20 RX ORDER — MAGNESIUM SULFATE IN WATER 40 MG/ML
2000 INJECTION, SOLUTION INTRAVENOUS ONCE
Status: COMPLETED | OUTPATIENT
Start: 2024-04-20 | End: 2024-04-20

## 2024-04-20 RX ORDER — AMLODIPINE BESYLATE 2.5 MG/1
2.5 TABLET ORAL DAILY
Status: DISCONTINUED | OUTPATIENT
Start: 2024-04-20 | End: 2024-04-25 | Stop reason: HOSPADM

## 2024-04-20 RX ORDER — ASPIRIN 81 MG/1
81 TABLET ORAL DAILY
Status: DISCONTINUED | OUTPATIENT
Start: 2024-04-20 | End: 2024-04-25 | Stop reason: HOSPADM

## 2024-04-20 RX ORDER — IPRATROPIUM BROMIDE AND ALBUTEROL SULFATE 2.5; .5 MG/3ML; MG/3ML
1 SOLUTION RESPIRATORY (INHALATION) EVERY 4 HOURS PRN
Status: DISCONTINUED | OUTPATIENT
Start: 2024-04-20 | End: 2024-04-22

## 2024-04-20 RX ORDER — CARVEDILOL 6.25 MG/1
6.25 TABLET ORAL 2 TIMES DAILY WITH MEALS
Status: DISCONTINUED | OUTPATIENT
Start: 2024-04-20 | End: 2024-04-25 | Stop reason: HOSPADM

## 2024-04-20 RX ORDER — CODEINE PHOSPHATE AND GUAIFENESIN 10; 100 MG/5ML; MG/5ML
5 SOLUTION ORAL ONCE
Status: COMPLETED | OUTPATIENT
Start: 2024-04-20 | End: 2024-04-20

## 2024-04-20 RX ORDER — 0.9 % SODIUM CHLORIDE 0.9 %
1000 INTRAVENOUS SOLUTION INTRAVENOUS ONCE
Status: COMPLETED | OUTPATIENT
Start: 2024-04-20 | End: 2024-04-20

## 2024-04-20 RX ORDER — ONDANSETRON 2 MG/ML
4 INJECTION INTRAMUSCULAR; INTRAVENOUS EVERY 6 HOURS PRN
Status: DISCONTINUED | OUTPATIENT
Start: 2024-04-20 | End: 2024-04-25 | Stop reason: HOSPADM

## 2024-04-20 RX ORDER — ATORVASTATIN CALCIUM 40 MG/1
40 TABLET, FILM COATED ORAL NIGHTLY
Status: DISCONTINUED | OUTPATIENT
Start: 2024-04-20 | End: 2024-04-25 | Stop reason: HOSPADM

## 2024-04-20 RX ORDER — HYDROCHLOROTHIAZIDE 25 MG/1
25 TABLET ORAL DAILY
Status: DISCONTINUED | OUTPATIENT
Start: 2024-04-20 | End: 2024-04-25 | Stop reason: HOSPADM

## 2024-04-20 RX ADMIN — SERTRALINE HYDROCHLORIDE 50 MG: 50 TABLET ORAL at 18:26

## 2024-04-20 RX ADMIN — GUAIFENESIN AND CODEINE PHOSPHATE 5 ML: 100; 10 SOLUTION ORAL at 07:13

## 2024-04-20 RX ADMIN — ASPIRIN 81 MG: 81 TABLET, COATED ORAL at 14:32

## 2024-04-20 RX ADMIN — ATORVASTATIN CALCIUM 40 MG: 40 TABLET, FILM COATED ORAL at 20:16

## 2024-04-20 RX ADMIN — HYDROCHLOROTHIAZIDE 25 MG: 25 TABLET ORAL at 14:32

## 2024-04-20 RX ADMIN — WATER 40 MG: 1 INJECTION INTRAMUSCULAR; INTRAVENOUS; SUBCUTANEOUS at 15:44

## 2024-04-20 RX ADMIN — DEXAMETHASONE SODIUM PHOSPHATE 10 MG: 10 INJECTION, SOLUTION INTRAMUSCULAR; INTRAVENOUS at 07:13

## 2024-04-20 RX ADMIN — ARFORMOTEROL TARTRATE: 15 SOLUTION RESPIRATORY (INHALATION) at 19:58

## 2024-04-20 RX ADMIN — AZITHROMYCIN MONOHYDRATE 500 MG: 500 INJECTION, POWDER, LYOPHILIZED, FOR SOLUTION INTRAVENOUS at 15:50

## 2024-04-20 RX ADMIN — MAGNESIUM SULFATE HEPTAHYDRATE 2000 MG: 40 INJECTION, SOLUTION INTRAVENOUS at 07:18

## 2024-04-20 RX ADMIN — IOPAMIDOL 100 ML: 755 INJECTION, SOLUTION INTRAVENOUS at 07:36

## 2024-04-20 RX ADMIN — WATER 40 MG: 1 INJECTION INTRAMUSCULAR; INTRAVENOUS; SUBCUTANEOUS at 20:16

## 2024-04-20 RX ADMIN — AMLODIPINE BESYLATE 2.5 MG: 2.5 TABLET ORAL at 14:32

## 2024-04-20 RX ADMIN — WATER 40 MG: 1 INJECTION INTRAMUSCULAR; INTRAVENOUS; SUBCUTANEOUS at 15:42

## 2024-04-20 RX ADMIN — IPRATROPIUM BROMIDE AND ALBUTEROL SULFATE 1 DOSE: .5; 3 SOLUTION RESPIRATORY (INHALATION) at 19:58

## 2024-04-20 RX ADMIN — CARVEDILOL 6.25 MG: 6.25 TABLET, FILM COATED ORAL at 18:26

## 2024-04-20 RX ADMIN — SODIUM CHLORIDE 1000 ML: 9 INJECTION, SOLUTION INTRAVENOUS at 08:41

## 2024-04-20 RX ADMIN — IPRATROPIUM BROMIDE AND ALBUTEROL SULFATE 1 DOSE: .5; 3 SOLUTION RESPIRATORY (INHALATION) at 15:24

## 2024-04-20 NOTE — ED NOTES
0715: Assumed care of patient. Bedside shift change report given to CHADNANA Steiner (oncoming nurse) by CHANDANA Romo (offgoing nurse). Report included the following information Nurse Handoff Report. Patient on monitor x3 with call bell within reach and side rails x2.     0840: Upon entering patient room, patient attempting to get out of bed and yelling, \"I'm trying to go to the bathroom.\" Asked patient about using bedside commode, patient refusing to use bathroom at this time and assisted back into bed. Placed patient back on the monitor x3 with call bell within reach and side rails x2. Informed patient of importance of calling RN when trying to use restroom or get out of bed. Informed patient of how to use call bell to call RN. Patient verbalized understanding.    0955: Patient removed IV upon RN entering room. Patient removed nasal cannula. Reminded patient of importance to keep IV intact and remain on nasal cannula d/t oxygen need. Replaced nasal cannula and switched IV fluids to other IV site. Patient provided with breakfast tray ATT.

## 2024-04-20 NOTE — H&P
Hospitalist Admission Note    NAME:   Li Casanova   : 1960   MRN: [id number]     Date/Time: 2024 1:21 PM    Patient PCP: Star Napier MD    ______________________________________________________________________  Given the patient's current clinical presentation, I have a high level of concern for decompensation if discharged from the emergency department.  Complex decision making was performed, which includes reviewing the patient's available past medical records, laboratory results, and x-ray films.       My assessment of this patient's clinical condition and my plan of care is as follows.    Assessment / Plan:  COPD exacerbation  Pulmonary nodule POA  CTA of the chest negative for PE or infectious process  Continue with Solu-Medrol IV  Will start on azithromycin  DuoNeb as needed and scheduled    CAD  Continue with aspirin and statin  Continuous coreg  Patient has recent echo  test showed EF 63%    History of stroke POA with hemorrhagic conversion  With residual aphasia confusion BLE weakness  HTN  HLD  Continuous home medication as prescribed      Medical Decision Making:   I personally reviewed labs: yes  I personally reviewed imaging:yes  I personally reviewed EKG:  Toxic drug monitoring: yes  Discussed case with: ED provider. After discussion I am in agreement that acuity of patient's medical condition necessitates hospital stay.      Code Status: Full code  DVT Prophylaxis:   Baseline:     Subjective:   CHIEF COMPLAINT: SOB    HISTORY OF PRESENT ILLNESS:     Li Casanova is a 63 y.o.  female with PMHx significant for recent NSTEMI after cocaine use, history of CVA hypertension and asthma COPD presented to the emergency room today with shortness of breath and dry cough, patient.  To the ED with EMS, and they state she was adherent and 88% at room air at home and they placed her on 3 L nasal cannula , no fever or chills, no sick contact.  ED course patient felt slightly better

## 2024-04-20 NOTE — ED PROVIDER NOTES
EKG interpreted by me.  Shows Normal Sinus rhythm with a HR of 106.  No ST elevations or depressions concerning for acute ischemia.   MD Anat CARDENAS Douglas, MD  04/20/24 1799

## 2024-04-20 NOTE — ED PROVIDER NOTES
[facility] EMERGENCY DEPT  EMERGENCY DEPARTMENT ENCOUNTER       Pt Name: Li Casanova  MRN: [id number]  Birthdate 1960  Date of evaluation: 4/20/2024  Provider: JESUS Moser - JITENDRA   PCP: Star Napier MD  Note Started:  9:00 AM EDT 4/20/24     CHIEF COMPLAINT       Chief Complaint   Patient presents with    Shortness of Breath     Brought in by EMS from home cc of SOB with wheezing and Asthma attack.  As per EMS, patient's SPO2 88% on RA upon arrival, started on Duoneb Treatment and improved to 98%.          HISTORY OF PRESENT ILLNESS: 1 or more elements      History From: Patient and EMS  HPI Limitations: None     Li Casanova is a 63 y.o. female past medical history of NSTEMI, asthma, CVA, hypertension, who presents complaining of shortness of breath, dry cough and increased work of breathing x 3 days.  Patient reports taking her asthma medication with no relief.  Patient is brought in by EMS, on arrival to the patient home saturation was 88% on room air, patient was placed on oxygen at 2 L nasal cannula and given a nebulizer treatment with improvement in saturation.  Patient denies any associated symptoms including fever, chills, nausea, vomiting, diarrhea, sick contact, changes in activity, recent travel, runny nose, sinus congestion, headache, dizziness.  Patient denies history of COPD or CHF.  Endorses daily tobacco abuse.  Denies drug or alcohol use.     Nursing Notes were all reviewed and agreed with or any disagreements were addressed in the HPI.     REVIEW OF SYSTEMS      Review of Systems     Positives and Pertinent negatives as per HPI.    PAST HISTORY     Past Medical History:  Past Medical History:   Diagnosis Date    Asthma     CVA (cerebral vascular accident) (HCC)     Drug abuse ([facility])     states, \"I do crack every once in a while\"       Past Surgical History:  No past surgical history on file.    Family History:  No family history on file.    Social History:  Social History     Tobacco  breathing with use of abdominal muscles, oxygen saturation 94% on 2 L of oxygen.  Discussed with patient hospital consultation for admission due to new increased oxygen demand, patient is agreeable with admission. [RT]   0908 Consulted with hospitalist, will admit patient for further workup.  Patient in agreement with this plan. [RT]      ED Course User Index  [RT] Mariella Dai, APRN - CNP       Disposition Considerations (Tests not done, Shared Decision Making, Pt Expectation of Test or Tx.): As above     FINAL IMPRESSION     1. Moderate persistent asthma with exacerbation    2. Increased oxygen demand          DISPOSITION/PLAN   DISPOSITION        Admit Note: Pt is being admitted by Hospitalist. The results of their tests and reason(s) for their admission have been discussed with pt and/or available family. They convey agreement and understanding for the need to be admitted and for the admission diagnosis.     PATIENT REFERRED TO:  No follow-up provider specified.     DISCHARGE MEDICATIONS:     Medication List        CONTINUE taking these medications      Compressor/Nebulizer Misc  1 Device by Does not apply route once for 1 dose            ASK your doctor about these medications      albuterol sulfate  (90 Base) MCG/ACT inhaler  Commonly known as: PROVENTIL;VENTOLIN;PROAIR     amLODIPine 2.5 MG tablet  Commonly known as: NORVASC  Take 1 tablet by mouth daily     ASPIRIN 81 PO     atorvastatin 40 MG tablet  Commonly known as: LIPITOR  Take 1 tablet by mouth nightly     budesonide-formoterol 80-4.5 MCG/ACT Aero  Commonly known as: Symbicort  Inhale 2 puffs into the lungs 2 times daily     carvedilol 6.25 MG tablet  Commonly known as: COREG  Take 1 tablet by mouth 2 times daily (with meals)     cetirizine 10 MG tablet  Commonly known as: ZYRTEC     clopidogrel 75 MG tablet  Commonly known as: PLAVIX     docusate sodium 100 MG capsule  Commonly known as: COLACE     ferrous sulfate 325 (65 Fe) MG EC

## 2024-04-20 NOTE — PROGRESS NOTES
Nursing contacted Nocturnist/cross cover provider and notified patient vomiting, asking for antiemetics. No other concerns reported. No acute distress reported. VSS. Ordered zofran prn. Will defer further evaluation/management to the day shift primary attending care team. Patient denies any further complaints or concerns. Nursing to notify Hospitalist for further/continued concerns. Will remain available overnight for further concerns if nursing/patient needs.     Non-billable note.

## 2024-04-20 NOTE — ED NOTES
Report given to CHANDANA Steiner. Nurse was informed of reason for arrival, vitals, labs, medications, orders, procedures, results, anything left pending and current plan of action. Questions were asked and received prior to departure from the patient.

## 2024-04-21 LAB
AMPHET UR QL SCN: NEGATIVE
BARBITURATES UR QL SCN: NEGATIVE
BENZODIAZ UR QL: NEGATIVE
CANNABINOIDS UR QL SCN: NEGATIVE
COCAINE UR QL SCN: POSITIVE
Lab: ABNORMAL
METHADONE UR QL: NEGATIVE
OPIATES UR QL: POSITIVE
PCP UR QL: NEGATIVE

## 2024-04-21 PROCEDURE — 94761 N-INVAS EAR/PLS OXIMETRY MLT: CPT

## 2024-04-21 PROCEDURE — 6370000000 HC RX 637 (ALT 250 FOR IP): Performed by: STUDENT IN AN ORGANIZED HEALTH CARE EDUCATION/TRAINING PROGRAM

## 2024-04-21 PROCEDURE — 2700000000 HC OXYGEN THERAPY PER DAY

## 2024-04-21 PROCEDURE — 94640 AIRWAY INHALATION TREATMENT: CPT

## 2024-04-21 PROCEDURE — 6360000002 HC RX W HCPCS: Performed by: STUDENT IN AN ORGANIZED HEALTH CARE EDUCATION/TRAINING PROGRAM

## 2024-04-21 PROCEDURE — 80307 DRUG TEST PRSMV CHEM ANLYZR: CPT

## 2024-04-21 PROCEDURE — 2580000003 HC RX 258: Performed by: STUDENT IN AN ORGANIZED HEALTH CARE EDUCATION/TRAINING PROGRAM

## 2024-04-21 PROCEDURE — 94760 N-INVAS EAR/PLS OXIMETRY 1: CPT

## 2024-04-21 PROCEDURE — 1100000000 HC RM PRIVATE

## 2024-04-21 RX ADMIN — ASPIRIN 81 MG: 81 TABLET, COATED ORAL at 10:17

## 2024-04-21 RX ADMIN — IPRATROPIUM BROMIDE AND ALBUTEROL SULFATE 1 DOSE: .5; 3 SOLUTION RESPIRATORY (INHALATION) at 09:56

## 2024-04-21 RX ADMIN — AZITHROMYCIN MONOHYDRATE 500 MG: 500 INJECTION, POWDER, LYOPHILIZED, FOR SOLUTION INTRAVENOUS at 15:35

## 2024-04-21 RX ADMIN — IPRATROPIUM BROMIDE AND ALBUTEROL SULFATE 1 DOSE: .5; 3 SOLUTION RESPIRATORY (INHALATION) at 20:21

## 2024-04-21 RX ADMIN — ATORVASTATIN CALCIUM 40 MG: 40 TABLET, FILM COATED ORAL at 21:07

## 2024-04-21 RX ADMIN — WATER 40 MG: 1 INJECTION INTRAMUSCULAR; INTRAVENOUS; SUBCUTANEOUS at 21:07

## 2024-04-21 RX ADMIN — WATER 40 MG: 1 INJECTION INTRAMUSCULAR; INTRAVENOUS; SUBCUTANEOUS at 10:18

## 2024-04-21 RX ADMIN — HYDROCHLOROTHIAZIDE 25 MG: 25 TABLET ORAL at 10:17

## 2024-04-21 RX ADMIN — AMLODIPINE BESYLATE 2.5 MG: 2.5 TABLET ORAL at 10:17

## 2024-04-21 RX ADMIN — CARVEDILOL 6.25 MG: 6.25 TABLET, FILM COATED ORAL at 10:17

## 2024-04-21 RX ADMIN — IPRATROPIUM BROMIDE AND ALBUTEROL SULFATE 1 DOSE: .5; 3 SOLUTION RESPIRATORY (INHALATION) at 12:01

## 2024-04-21 RX ADMIN — ARFORMOTEROL TARTRATE: 15 SOLUTION RESPIRATORY (INHALATION) at 09:56

## 2024-04-21 RX ADMIN — SERTRALINE HYDROCHLORIDE 50 MG: 50 TABLET ORAL at 10:17

## 2024-04-21 RX ADMIN — CARVEDILOL 6.25 MG: 6.25 TABLET, FILM COATED ORAL at 19:03

## 2024-04-21 RX ADMIN — IPRATROPIUM BROMIDE AND ALBUTEROL SULFATE 1 DOSE: .5; 3 SOLUTION RESPIRATORY (INHALATION) at 15:21

## 2024-04-21 RX ADMIN — ARFORMOTEROL TARTRATE: 15 SOLUTION RESPIRATORY (INHALATION) at 20:21

## 2024-04-21 NOTE — CARE COORDINATION
completed by outside entity 1/31/24, signed by physician on 2/6/24. Pt's cousin expressed interest in private duty care services during last admission, sharing that she hasn't been able to locate an agency that works with [facility] Medicaid. Previous CM, TAYLER Pritchard connected cousin to [employer] 3/21/24 to assist in coordinating/enrolling pt in private duty care services in-network with insurance coverage. Status of private duty care services to be confirmed by unit CM if/as needed. Per chart, pt has upcoming neurology apt on 4/25/24 at 2:00 PM with ALIYA Redmond (NP affiliated with [facility]); apt will need to be canceled/rescheduled if pt remains admitted. Assessment below:     04/21/24 7360   Service Assessment   Patient Orientation Unable to Assess   Cognition Other (see comment)  (Chart details Hx of intermittent confusion; CM unable to measure pt's orientation status, as she was observed sleeping upon first attempt to assess)   History Provided By Medical Record   Primary Caregiver Family  (Cousin (Amaya Freire))   Support Systems Friends/Neighbors;Family Members   Patient's Healthcare Decision Maker is: Legal Next of Kin   PCP Verified by CM Yes  (PCP is Dr. Star Napier associated with [facility] (office: [phone]))   Prior Functional Level Assistance with the following:;Shopping;Cooking;Housework   Current Functional Level Assistance with the following:;Cooking;Housework;Shopping   Can patient return to prior living arrangement Unknown at present   Ability to make needs known: Poor   Family able to assist with home care needs: Yes   Would you like for me to discuss the discharge plan with any other family members/significant others, and if so, who? Yes  (Cousin is primary emergency contact (Amaya Freire))   Financial Resources Medicaid  ([facility] Medicaid)   Community Resources Other (Comment)  (Cousin provided with information for [employer] 3/21/24; agency works to connect pt's to private duty  you left the hospital? No   Who advised the patient to return to the hospital? Caregiver   Does the patient report anything that got in the way of taking their medications? No   In our efforts to provide the best possible care to you and others like you, can you think of anything that we could have done to help you after you left the hospital the first time, so that you might not have needed to return so soon? Identify patient's health literacy needs;Teaching during hospitalization regarding your illness;Teach back instructions regarding management of illness;Education on how to continue taking medications upon discharge;Additional Community resources available for illness support       MARII Whitley  [facility] CM

## 2024-04-21 NOTE — PLAN OF CARE
Problem: Safety - Adult  Goal: Free from fall injury  Outcome: Progressing     Problem: Respiratory - Adult  Goal: Achieves optimal ventilation and oxygenation  4/21/2024 0255 by Phoebe Chapa, RN  Outcome: Progressing  4/20/2024 1526 by Christopher Valdivia, RT  Outcome: Progressing     Problem: Discharge Planning  Goal: Discharge to home or other facility with appropriate resources  Outcome: Progressing

## 2024-04-22 PROCEDURE — 94640 AIRWAY INHALATION TREATMENT: CPT

## 2024-04-22 PROCEDURE — 1100000000 HC RM PRIVATE

## 2024-04-22 PROCEDURE — 6360000002 HC RX W HCPCS: Performed by: INTERNAL MEDICINE

## 2024-04-22 PROCEDURE — 94761 N-INVAS EAR/PLS OXIMETRY MLT: CPT

## 2024-04-22 PROCEDURE — 94760 N-INVAS EAR/PLS OXIMETRY 1: CPT

## 2024-04-22 PROCEDURE — 2580000003 HC RX 258: Performed by: STUDENT IN AN ORGANIZED HEALTH CARE EDUCATION/TRAINING PROGRAM

## 2024-04-22 PROCEDURE — 6360000002 HC RX W HCPCS: Performed by: STUDENT IN AN ORGANIZED HEALTH CARE EDUCATION/TRAINING PROGRAM

## 2024-04-22 PROCEDURE — 6370000000 HC RX 637 (ALT 250 FOR IP): Performed by: STUDENT IN AN ORGANIZED HEALTH CARE EDUCATION/TRAINING PROGRAM

## 2024-04-22 PROCEDURE — 2700000000 HC OXYGEN THERAPY PER DAY

## 2024-04-22 RX ORDER — ENOXAPARIN SODIUM 100 MG/ML
40 INJECTION SUBCUTANEOUS DAILY
Status: DISCONTINUED | OUTPATIENT
Start: 2024-04-22 | End: 2024-04-25 | Stop reason: HOSPADM

## 2024-04-22 RX ORDER — IPRATROPIUM BROMIDE AND ALBUTEROL SULFATE 2.5; .5 MG/3ML; MG/3ML
1 SOLUTION RESPIRATORY (INHALATION)
Status: DISCONTINUED | OUTPATIENT
Start: 2024-04-22 | End: 2024-04-22

## 2024-04-22 RX ORDER — IPRATROPIUM BROMIDE AND ALBUTEROL SULFATE 2.5; .5 MG/3ML; MG/3ML
1 SOLUTION RESPIRATORY (INHALATION)
Status: DISCONTINUED | OUTPATIENT
Start: 2024-04-22 | End: 2024-04-25 | Stop reason: HOSPADM

## 2024-04-22 RX ADMIN — HYDROCHLOROTHIAZIDE 25 MG: 25 TABLET ORAL at 09:07

## 2024-04-22 RX ADMIN — CARVEDILOL 6.25 MG: 6.25 TABLET, FILM COATED ORAL at 09:07

## 2024-04-22 RX ADMIN — SERTRALINE HYDROCHLORIDE 50 MG: 50 TABLET ORAL at 09:07

## 2024-04-22 RX ADMIN — WATER 40 MG: 1 INJECTION INTRAMUSCULAR; INTRAVENOUS; SUBCUTANEOUS at 21:51

## 2024-04-22 RX ADMIN — ATORVASTATIN CALCIUM 40 MG: 40 TABLET, FILM COATED ORAL at 21:51

## 2024-04-22 RX ADMIN — AZITHROMYCIN MONOHYDRATE 500 MG: 500 INJECTION, POWDER, LYOPHILIZED, FOR SOLUTION INTRAVENOUS at 14:08

## 2024-04-22 RX ADMIN — ARFORMOTEROL TARTRATE: 15 SOLUTION RESPIRATORY (INHALATION) at 09:35

## 2024-04-22 RX ADMIN — IPRATROPIUM BROMIDE AND ALBUTEROL SULFATE 1 DOSE: .5; 3 SOLUTION RESPIRATORY (INHALATION) at 09:40

## 2024-04-22 RX ADMIN — CARVEDILOL 6.25 MG: 6.25 TABLET, FILM COATED ORAL at 17:41

## 2024-04-22 RX ADMIN — WATER 40 MG: 1 INJECTION INTRAMUSCULAR; INTRAVENOUS; SUBCUTANEOUS at 09:07

## 2024-04-22 RX ADMIN — ASPIRIN 81 MG: 81 TABLET, COATED ORAL at 09:07

## 2024-04-22 RX ADMIN — AMLODIPINE BESYLATE 2.5 MG: 2.5 TABLET ORAL at 09:07

## 2024-04-22 RX ADMIN — ENOXAPARIN SODIUM 40 MG: 100 INJECTION SUBCUTANEOUS at 11:57

## 2024-04-22 NOTE — PROGRESS NOTES
ADULT PROTOCOL: JET AEROSOL ASSESSMENT    Patient  Li Casanova     63 y.o.   female     4/22/2024  11:52 AM    Breath Sounds Pre Procedure: Breath Sounds Pre-Tx VANESSA: Fine crackles                                    Breath Sounds Post Procedure: Breath Sounds Post-Tx VANESSA: Fine crackles                    Heart Rate: Pre procedure Pre-Tx Pulse: 100           Post procedure      Resp Rate: Pre procedure Pre-Tx Resps: 16           Post procedure      Oxygen: O2 Therapy: Oxygen   nasal cannula     Changed: No    SpO2:  SpO2: 99 %   with Oxygen                Nebulizer Therapy: Current medications Medications: Albuterol/Ipratropium      Changed: Yes  Patient takes treatment at home PRN    Smoking History: Former smoker    Problem List:   Patient Active Problem List   Diagnosis    NSTEMI (non-ST elevated myocardial infarction) ([facility])    COPD (chronic obstructive pulmonary disease) ([facility])       Respiratory Therapist: Sana Maldonado RT

## 2024-04-22 NOTE — CONSULTS
Pulmonary, Critical Care, and Sleep Medicine~Consult Note    Name: Li Casanova MRN: [id number]   : 1960 Hospital: [facility]   Date: 2024 4:17 PM Admission: 2024     Impression Plan   COPD/asthma with acute exacerbation. Likely triggered by cocaine and tobacco use  Polysubstance abuse. Utox +cocaine, opioids.  H/o CVA  HTN  Tobacco abuse Supp o2 prn--on RA. Exercise oximetry prior to discharge.  Continue ics/laba nebs, duonebs. On discharge would send on ICS/LABA inhaler  Cont w/ IVCS  Dvt ppx: lovenox  Recommend cessation of tobacco and illicit drugs  Recommend outpatient pulmonary follow up and PFTs    Thank you for the consult, will see again prn     Daily Progression:    Consult for COPD    HPI: 64 y/o F with PMH recent NSTEMI after cocaine use, history of CVA with hemorrhagic conversion (with residual aphasia, confusion, and BLE weakness), hypertension, asthma and COPD presented w/ sob and dry cough. On EMS arrival her O2 sats were 88%. Sats improved on 2L O2.    CTA chest 24: No pulmonary embolism. No thoracic aortic dissection or aneurysm.  No acute cardiopulmonary abnormality. Mild to moderate centrilobular emphysema. Patulous mid and lower esophagus with air-fluid levels suggestive of gastroesophageal reflux.    Utox +cocaine, opiates    ROS: she feels ok. Denies SOB at rest. She denies cough.  She has never seen a pulmonologist in the past. At home she uses albuterol 4 times a day; she does not have a maintenance inhaler.    Social hx: currently smokes 5-6 cigarettes a day. In the past she smoked up to <1 PPD. She started smoking at age 16. She denies use of cocaine.    I weaned her to RA with O2 sat 93%.    FH: negative for inheritable lung disease    I have reviewed the labs and previous day’s notes.    Pertinent items are noted in HPI.  Past Medical History:   Diagnosis Date    Asthma     CVA (cerebral vascular accident) (HCC)

## 2024-04-22 NOTE — PROGRESS NOTES
Hospitalist Progress Note    NAME:   Li Casanova   : 1960   MRN: [id number]     Date/Time: 2024 8:30 PM  Patient PCP: Star Napier MD    Estimated discharge date:  Barriers: medical stability      Assessment / Plan:  COPD exacerbation  Pulmonary nodule POA  CTA of the chest negative for PE or infectious process  Continue with Solu-Medrol IV  on azithromycin  DuoNeb as needed and scheduled     CAD  Continue with aspirin and statin  Continuous coreg  Patient has recent echo  test showed EF 63%     History of stroke POA with hemorrhagic conversion  With residual aphasia confusion BLE weakness  HTN  HLD  Continuous home medication as prescribed            Medical Decision Making:   I personally reviewed labs:CBC ,BMP  I personally reviewed imaging:CXR  I personally reviewed EKG:  Toxic drug monitoring:   Discussed case with: nurse ,IDR        Code Status: FULL  DVT Prophylaxis: SCD  GI Prophylaxis:    Subjective:     Chief Complaint / Reason for Physician Visit  \"\".  Discussed with RN events overnight.       Objective:     VITALS:   Last 24hrs VS reviewed since prior progress note. Most recent are:  Patient Vitals for the past 24 hrs:   BP Temp Temp src Pulse Resp SpO2   24 1941 (!) 147/82 98.2 °F (36.8 °C) Oral (!) 102 18 100 %   24 1000 (!) 160/90 -- Oral (!) 107 20 96 %       No intake or output data in the 24 hours ending 24     I had a face to face encounter and independently examined this patient on 2024, as outlined below:  PHYSICAL EXAM:  General: Alert, cooperative  EENT:  EOMI. Anicteric sclerae.  Resp:  CTA bilaterally, no wheezing or rales.  No accessory muscle use  CV:  Regular  rhythm,  No edema  GI:  Soft, Non distended, Non tender.  +Bowel sounds  Neurologic:  Alert and oriented X 3, normal speech,   Psych:   Good insight. Not anxious nor agitated  Skin:  No rashes.  No jaundice    Reviewed most current lab test results and cultures

## 2024-04-23 LAB
ANION GAP SERPL CALC-SCNC: 3 MMOL/L (ref 5–15)
BASOPHILS # BLD: 0 K/UL (ref 0–0.1)
BASOPHILS NFR BLD: 0 % (ref 0–1)
BUN SERPL-MCNC: 21 MG/DL (ref 6–20)
BUN/CREAT SERPL: 24 (ref 12–20)
CALCIUM SERPL-MCNC: 9.8 MG/DL (ref 8.5–10.1)
CHLORIDE SERPL-SCNC: 103 MMOL/L (ref 97–108)
CO2 SERPL-SCNC: 30 MMOL/L (ref 21–32)
CREAT SERPL-MCNC: 0.89 MG/DL (ref 0.55–1.02)
DIFFERENTIAL METHOD BLD: ABNORMAL
EOSINOPHIL # BLD: 0 K/UL (ref 0–0.4)
EOSINOPHIL NFR BLD: 0 % (ref 0–7)
ERYTHROCYTE [DISTWIDTH] IN BLOOD BY AUTOMATED COUNT: 16 % (ref 11.5–14.5)
GLUCOSE SERPL-MCNC: 154 MG/DL (ref 65–100)
HCT VFR BLD AUTO: 35.3 % (ref 35–47)
HGB BLD-MCNC: 10.5 G/DL (ref 11.5–16)
IMM GRANULOCYTES # BLD AUTO: 0.1 K/UL (ref 0–0.04)
IMM GRANULOCYTES NFR BLD AUTO: 0 % (ref 0–0.5)
LYMPHOCYTES # BLD: 1.1 K/UL (ref 0.8–3.5)
LYMPHOCYTES NFR BLD: 9 % (ref 12–49)
MCH RBC QN AUTO: 26.6 PG (ref 26–34)
MCHC RBC AUTO-ENTMCNC: 29.7 G/DL (ref 30–36.5)
MCV RBC AUTO: 89.4 FL (ref 80–99)
MONOCYTES # BLD: 0.5 K/UL (ref 0–1)
MONOCYTES NFR BLD: 4 % (ref 5–13)
NEUTS SEG # BLD: 10.6 K/UL (ref 1.8–8)
NEUTS SEG NFR BLD: 87 % (ref 32–75)
NRBC # BLD: 0 K/UL (ref 0–0.01)
NRBC BLD-RTO: 0 PER 100 WBC
PLATELET # BLD AUTO: 322 K/UL (ref 150–400)
PMV BLD AUTO: 11.1 FL (ref 8.9–12.9)
POTASSIUM SERPL-SCNC: 4.2 MMOL/L (ref 3.5–5.1)
RBC # BLD AUTO: 3.95 M/UL (ref 3.8–5.2)
SODIUM SERPL-SCNC: 136 MMOL/L (ref 136–145)
WBC # BLD AUTO: 12.3 K/UL (ref 3.6–11)

## 2024-04-23 PROCEDURE — 85025 COMPLETE CBC W/AUTO DIFF WBC: CPT

## 2024-04-23 PROCEDURE — 1100000000 HC RM PRIVATE

## 2024-04-23 PROCEDURE — 6360000002 HC RX W HCPCS: Performed by: INTERNAL MEDICINE

## 2024-04-23 PROCEDURE — 6360000002 HC RX W HCPCS: Performed by: STUDENT IN AN ORGANIZED HEALTH CARE EDUCATION/TRAINING PROGRAM

## 2024-04-23 PROCEDURE — 36415 COLL VENOUS BLD VENIPUNCTURE: CPT

## 2024-04-23 PROCEDURE — 80048 BASIC METABOLIC PNL TOTAL CA: CPT

## 2024-04-23 PROCEDURE — 6370000000 HC RX 637 (ALT 250 FOR IP): Performed by: INTERNAL MEDICINE

## 2024-04-23 PROCEDURE — 94640 AIRWAY INHALATION TREATMENT: CPT

## 2024-04-23 PROCEDURE — 2580000003 HC RX 258: Performed by: STUDENT IN AN ORGANIZED HEALTH CARE EDUCATION/TRAINING PROGRAM

## 2024-04-23 PROCEDURE — 6370000000 HC RX 637 (ALT 250 FOR IP): Performed by: STUDENT IN AN ORGANIZED HEALTH CARE EDUCATION/TRAINING PROGRAM

## 2024-04-23 RX ORDER — ATORVASTATIN CALCIUM 20 MG/1
20 TABLET, FILM COATED ORAL DAILY
COMMUNITY

## 2024-04-23 RX ADMIN — ENOXAPARIN SODIUM 40 MG: 100 INJECTION SUBCUTANEOUS at 08:47

## 2024-04-23 RX ADMIN — WATER 40 MG: 1 INJECTION INTRAMUSCULAR; INTRAVENOUS; SUBCUTANEOUS at 08:47

## 2024-04-23 RX ADMIN — ATORVASTATIN CALCIUM 40 MG: 40 TABLET, FILM COATED ORAL at 21:15

## 2024-04-23 RX ADMIN — ASPIRIN 81 MG: 81 TABLET, COATED ORAL at 08:47

## 2024-04-23 RX ADMIN — WATER 40 MG: 1 INJECTION INTRAMUSCULAR; INTRAVENOUS; SUBCUTANEOUS at 21:15

## 2024-04-23 RX ADMIN — HYDROCHLOROTHIAZIDE 25 MG: 25 TABLET ORAL at 08:47

## 2024-04-23 RX ADMIN — IPRATROPIUM BROMIDE AND ALBUTEROL SULFATE 1 DOSE: .5; 3 SOLUTION RESPIRATORY (INHALATION) at 20:40

## 2024-04-23 RX ADMIN — CARVEDILOL 6.25 MG: 6.25 TABLET, FILM COATED ORAL at 08:47

## 2024-04-23 RX ADMIN — CARVEDILOL 6.25 MG: 6.25 TABLET, FILM COATED ORAL at 17:26

## 2024-04-23 RX ADMIN — ARFORMOTEROL TARTRATE: 15 SOLUTION RESPIRATORY (INHALATION) at 20:40

## 2024-04-23 RX ADMIN — SERTRALINE HYDROCHLORIDE 50 MG: 50 TABLET ORAL at 08:47

## 2024-04-23 RX ADMIN — AMLODIPINE BESYLATE 2.5 MG: 2.5 TABLET ORAL at 08:58

## 2024-04-23 NOTE — PLAN OF CARE
Problem: Safety - Adult  Goal: Free from fall injury  Outcome: Progressing     Problem: Respiratory - Adult  Goal: Achieves optimal ventilation and oxygenation  Outcome: Progressing     Problem: Discharge Planning  Goal: Discharge to home or other facility with appropriate resources  Outcome: Progressing

## 2024-04-23 NOTE — PROGRESS NOTES
Pharmacy Medication Reconciliation     Allergy Update: Patient has no known allergies.    Recommendations/Findings:   The following amendments were made to the patient's active medication list on file at [facility]:     1) Additions: n/a    2) Deletions: n/a    3) Changes:   Atorvastatin dose changed from 40 mg daily to 20 mg daily    Pertinent Findings:   Clarified PTA med list with the patient's cousin Amaya Freire, Rx[employer], and discharge summary from 3/21. The patient was unable to tell me which medications she takes.   The patient was discharged on several new medications after her admission from 3/18-3/21 including amlodipine, carvedilol and B complex-C-folic acid capsules. Her atorvastatin dose was increased from 20 to 40 mg daily. Per the patient's cousin these meds were never started after the patient was discharged because they were not called into [facility].   The patient's cousin requested a prescription for a nicotine patch for the patient. She emphasized that the patient needs to quit smoking. She stated that when the patient is at home she allows her no more than 3 cigarettes a day but cannot control how much she smokes when she is outside of the house.     PTA medication list was corrected to the following:       Prior to Admission Medications   Prescriptions Last Dose Informant   ASPIRIN 81 PO 4/20/2024 Caregiver/Healthcare Decision Maker, Outside Pharmacy/PCP   Sig: Take 81 mg by mouth daily   B Complex-C-Folic Acid (VIRT-CAPS) 1 MG CAPS Not Taking    Sig: Take 1 capsule by mouth daily   Patient not taking: Reported on 4/23/2024   albuterol sulfate HFA (PROVENTIL;VENTOLIN;PROAIR) 108 (90 Base) MCG/ACT inhaler 4/20/2024 Outside Pharmacy/PCP, Caregiver/Healthcare Decision Maker   Sig: Inhale 2 puffs into the lungs every 4 hours as needed   amLODIPine (NORVASC) 2.5 MG tablet Not Taking    Sig: Take 1 tablet by mouth daily   Patient not taking: Reported on 4/23/2024   atorvastatin (LIPITOR) 20 MG

## 2024-04-23 NOTE — PROGRESS NOTES
Hospitalist Progress Note    NAME:   Li Casanova   : 1960   MRN: [id number]     Date/Time: 2024 12:17 AM  Patient PCP: Star Napier MD    Estimated discharge date:  Barriers: medical stability      Assessment / Plan:  COPD exacerbation  Pulmonary nodule POA  CTA of the chest negative for PE or infectious process  Continue with Solu-Medrol IV  on azithromycin  DuoNeb as needed and scheduled     CAD  Continue with aspirin and statin  Continuous coreg  Patient has recent echo  test showed EF 63%     History of stroke POA with hemorrhagic conversion  With residual aphasia confusion BLE weakness  HTN  HLD  Continuous home medication as prescribed            Medical Decision Making:   I personally reviewed labs:CBC ,BMP  I personally reviewed imaging:CXR  I personally reviewed EKG:  Toxic drug monitoring:   Discussed case with: nurse ,IDR        Code Status: FULL  DVT Prophylaxis: SCD  GI Prophylaxis:    Subjective:     Chief Complaint / Reason for Physician Visit  \"\".  Discussed with RN events overnight.       Objective:     VITALS:   Last 24hrs VS reviewed since prior progress note. Most recent are:  Patient Vitals for the past 24 hrs:   BP Temp Temp src Pulse Resp SpO2   24 129/82 99 °F (37.2 °C) Oral 84 18 99 %   24 0739 (!) 150/96 97.1 °F (36.2 °C) Oral 83 18 99 %         No intake or output data in the 24 hours ending 24 0017     I had a face to face encounter and independently examined this patient on 2024, as outlined below:  PHYSICAL EXAM:  General: Alert, cooperative  EENT:  EOMI. Anicteric sclerae.  Resp:  CTA bilaterally, no wheezing or rales.  No accessory muscle use  CV:  Regular  rhythm,  No edema  GI:  Soft, Non distended, Non tender.  +Bowel sounds  Neurologic:  Alert and oriented X 3, normal speech,   Psych:   Good insight. Not anxious nor agitated  Skin:  No rashes.  No jaundice    Reviewed most current lab test results and cultures

## 2024-04-23 NOTE — PROGRESS NOTES
Patient appeared to be sleeping when I came in the room.  She opened her eyes and explained that I was there to review COPD information with her.  Patient asked if I could come back since she was sleepy.  Will attempt to see her again in the morning.

## 2024-04-24 LAB
ANION GAP SERPL CALC-SCNC: 5 MMOL/L (ref 5–15)
BASOPHILS # BLD: 0 K/UL (ref 0–0.1)
BASOPHILS NFR BLD: 0 % (ref 0–1)
BUN SERPL-MCNC: 22 MG/DL (ref 6–20)
BUN/CREAT SERPL: 27 (ref 12–20)
CALCIUM SERPL-MCNC: 10.2 MG/DL (ref 8.5–10.1)
CHLORIDE SERPL-SCNC: 102 MMOL/L (ref 97–108)
CO2 SERPL-SCNC: 29 MMOL/L (ref 21–32)
CREAT SERPL-MCNC: 0.82 MG/DL (ref 0.55–1.02)
DIFFERENTIAL METHOD BLD: ABNORMAL
EOSINOPHIL # BLD: 0 K/UL (ref 0–0.4)
EOSINOPHIL NFR BLD: 0 % (ref 0–7)
ERYTHROCYTE [DISTWIDTH] IN BLOOD BY AUTOMATED COUNT: 15.9 % (ref 11.5–14.5)
GLUCOSE SERPL-MCNC: 159 MG/DL (ref 65–100)
HCT VFR BLD AUTO: 37 % (ref 35–47)
HGB BLD-MCNC: 11.2 G/DL (ref 11.5–16)
IMM GRANULOCYTES # BLD AUTO: 0.1 K/UL (ref 0–0.04)
IMM GRANULOCYTES NFR BLD AUTO: 1 % (ref 0–0.5)
LYMPHOCYTES # BLD: 1.5 K/UL (ref 0.8–3.5)
LYMPHOCYTES NFR BLD: 12 % (ref 12–49)
MCH RBC QN AUTO: 26.4 PG (ref 26–34)
MCHC RBC AUTO-ENTMCNC: 30.3 G/DL (ref 30–36.5)
MCV RBC AUTO: 87.3 FL (ref 80–99)
MONOCYTES # BLD: 0.7 K/UL (ref 0–1)
MONOCYTES NFR BLD: 5 % (ref 5–13)
NEUTS SEG # BLD: 10.7 K/UL (ref 1.8–8)
NEUTS SEG NFR BLD: 82 % (ref 32–75)
NRBC # BLD: 0.02 K/UL (ref 0–0.01)
NRBC BLD-RTO: 0.2 PER 100 WBC
PLATELET # BLD AUTO: 315 K/UL (ref 150–400)
PMV BLD AUTO: 10.5 FL (ref 8.9–12.9)
POTASSIUM SERPL-SCNC: 4 MMOL/L (ref 3.5–5.1)
RBC # BLD AUTO: 4.24 M/UL (ref 3.8–5.2)
SODIUM SERPL-SCNC: 136 MMOL/L (ref 136–145)
WBC # BLD AUTO: 13 K/UL (ref 3.6–11)

## 2024-04-24 PROCEDURE — 2700000000 HC OXYGEN THERAPY PER DAY

## 2024-04-24 PROCEDURE — 80048 BASIC METABOLIC PNL TOTAL CA: CPT

## 2024-04-24 PROCEDURE — 94640 AIRWAY INHALATION TREATMENT: CPT

## 2024-04-24 PROCEDURE — 2580000003 HC RX 258: Performed by: STUDENT IN AN ORGANIZED HEALTH CARE EDUCATION/TRAINING PROGRAM

## 2024-04-24 PROCEDURE — 6360000002 HC RX W HCPCS: Performed by: STUDENT IN AN ORGANIZED HEALTH CARE EDUCATION/TRAINING PROGRAM

## 2024-04-24 PROCEDURE — 94760 N-INVAS EAR/PLS OXIMETRY 1: CPT

## 2024-04-24 PROCEDURE — 94761 N-INVAS EAR/PLS OXIMETRY MLT: CPT

## 2024-04-24 PROCEDURE — 6370000000 HC RX 637 (ALT 250 FOR IP): Performed by: INTERNAL MEDICINE

## 2024-04-24 PROCEDURE — 1100000000 HC RM PRIVATE

## 2024-04-24 PROCEDURE — 85025 COMPLETE CBC W/AUTO DIFF WBC: CPT

## 2024-04-24 PROCEDURE — 6370000000 HC RX 637 (ALT 250 FOR IP): Performed by: STUDENT IN AN ORGANIZED HEALTH CARE EDUCATION/TRAINING PROGRAM

## 2024-04-24 PROCEDURE — 6360000002 HC RX W HCPCS: Performed by: INTERNAL MEDICINE

## 2024-04-24 PROCEDURE — 36415 COLL VENOUS BLD VENIPUNCTURE: CPT

## 2024-04-24 RX ADMIN — ASPIRIN 81 MG: 81 TABLET, COATED ORAL at 08:57

## 2024-04-24 RX ADMIN — ARFORMOTEROL TARTRATE: 15 SOLUTION RESPIRATORY (INHALATION) at 20:41

## 2024-04-24 RX ADMIN — ENOXAPARIN SODIUM 40 MG: 100 INJECTION SUBCUTANEOUS at 08:57

## 2024-04-24 RX ADMIN — ARFORMOTEROL TARTRATE: 15 SOLUTION RESPIRATORY (INHALATION) at 08:53

## 2024-04-24 RX ADMIN — CARVEDILOL 6.25 MG: 6.25 TABLET, FILM COATED ORAL at 08:57

## 2024-04-24 RX ADMIN — ATORVASTATIN CALCIUM 40 MG: 40 TABLET, FILM COATED ORAL at 20:56

## 2024-04-24 RX ADMIN — IPRATROPIUM BROMIDE AND ALBUTEROL SULFATE 1 DOSE: .5; 3 SOLUTION RESPIRATORY (INHALATION) at 20:46

## 2024-04-24 RX ADMIN — HYDROCHLOROTHIAZIDE 25 MG: 25 TABLET ORAL at 08:57

## 2024-04-24 RX ADMIN — IPRATROPIUM BROMIDE AND ALBUTEROL SULFATE 1 DOSE: .5; 3 SOLUTION RESPIRATORY (INHALATION) at 08:47

## 2024-04-24 RX ADMIN — CARVEDILOL 6.25 MG: 6.25 TABLET, FILM COATED ORAL at 18:57

## 2024-04-24 RX ADMIN — WATER 40 MG: 1 INJECTION INTRAMUSCULAR; INTRAVENOUS; SUBCUTANEOUS at 20:56

## 2024-04-24 RX ADMIN — AMLODIPINE BESYLATE 2.5 MG: 2.5 TABLET ORAL at 08:57

## 2024-04-24 RX ADMIN — SERTRALINE HYDROCHLORIDE 50 MG: 50 TABLET ORAL at 08:57

## 2024-04-24 RX ADMIN — WATER 40 MG: 1 INJECTION INTRAMUSCULAR; INTRAVENOUS; SUBCUTANEOUS at 08:57

## 2024-04-24 NOTE — PROGRESS NOTES
Hospitalist Progress Note    NAME:   Li Casanova   : 1960   MRN: [id number]     Date/Time: 2024 4:05 PM  Patient PCP: Star Napier MD    Estimated discharge date:  Barriers: Improvement of O2 challenge      Assessment / Plan:  Acute hypoxic respiratory failure  COPD exacerbation  Pulmonary nodule POA  CTA of the chest negative for PE or infectious process  Continue with Solu-Medrol IV  on azithromycin  DuoNeb as needed and scheduled  : Patient desaturating with sats 86% on 2 L, will continue with steroid and nebs  Repeat O2 challenge     CAD  Continue with aspirin and statin  Continuous coreg  Patient has recent echo  test showed EF 63%     History of stroke POA with hemorrhagic conversion  With residual aphasia confusion BLE weakness  HTN  HLD  Continuous home medication as prescribed    Medical Decision Making:   I personally reviewed labs:CBC ,BMP  I personally reviewed imaging:CXR  I personally reviewed EKG:  Toxic drug monitoring:   Discussed case with: nurse ,IDR        Code Status: FULL  DVT Prophylaxis: SCD  GI Prophylaxis:    Subjective:     Chief Complaint / Reason for Physician Visit  Follow-up COPD exacerbation discussed with RN events overnight.   Not much interactive  Final O2 challenge    Objective:     VITALS:   Last 24hrs VS reviewed since prior progress note. Most recent are:  Patient Vitals for the past 24 hrs:   BP Temp Temp src Pulse Resp SpO2   24 0853 (!) 159/92 97.1 °F (36.2 °C) -- 80 18 99 %   24 133/87 98.2 °F (36.8 °C) Oral 85 17 99 %   24 1726 (!) 142/83 -- -- 86 -- --         No intake or output data in the 24 hours ending 24 1605     I had a face to face encounter and independently examined this patient on 2024, as outlined below:  PHYSICAL EXAM:  General: Alert, cooperative  EENT:  EOMI. Anicteric sclerae.  Resp:  CTA bilaterally, no wheezing or rales.  No accessory muscle use  CV:  Regular  rhythm,  No

## 2024-04-24 NOTE — PLAN OF CARE
Problem: Safety - Adult  Goal: Free from fall injury  Outcome: Progressing     Problem: Respiratory - Adult  Goal: Achieves optimal ventilation and oxygenation  4/23/2024 2251 by Berenice Collado RN  Outcome: Progressing  4/23/2024 2042 by Susy Cui RCP  Outcome: Progressing     Problem: Discharge Planning  Goal: Discharge to home or other facility with appropriate resources  Outcome: Progressing     Problem: Chronic Conditions and Co-morbidities  Goal: Patient's chronic conditions and co-morbidity symptoms are monitored and maintained or improved  Outcome: Progressing

## 2024-04-24 NOTE — PROGRESS NOTES
Pulse oximetry assessment   98% at rest on room air (if 88% or less, skip next steps)  88% while ambulating on room air  100% at rest on 2LPM  86% while ambulating on 2LPM

## 2024-04-25 VITALS
DIASTOLIC BLOOD PRESSURE: 86 MMHG | WEIGHT: 125.22 LBS | RESPIRATION RATE: 18 BRPM | HEART RATE: 87 BPM | TEMPERATURE: 98.4 F | BODY MASS INDEX: 25.28 KG/M2 | OXYGEN SATURATION: 100 % | SYSTOLIC BLOOD PRESSURE: 140 MMHG

## 2024-04-25 LAB
ANION GAP SERPL CALC-SCNC: 5 MMOL/L (ref 5–15)
BASOPHILS # BLD: 0 K/UL (ref 0–0.1)
BASOPHILS NFR BLD: 0 % (ref 0–1)
BUN SERPL-MCNC: 21 MG/DL (ref 6–20)
BUN/CREAT SERPL: 27 (ref 12–20)
CALCIUM SERPL-MCNC: 10.3 MG/DL (ref 8.5–10.1)
CHLORIDE SERPL-SCNC: 102 MMOL/L (ref 97–108)
CO2 SERPL-SCNC: 28 MMOL/L (ref 21–32)
CREAT SERPL-MCNC: 0.77 MG/DL (ref 0.55–1.02)
DIFFERENTIAL METHOD BLD: ABNORMAL
EOSINOPHIL # BLD: 0 K/UL (ref 0–0.4)
EOSINOPHIL NFR BLD: 0 % (ref 0–7)
ERYTHROCYTE [DISTWIDTH] IN BLOOD BY AUTOMATED COUNT: 15.6 % (ref 11.5–14.5)
GLUCOSE SERPL-MCNC: 161 MG/DL (ref 65–100)
HCT VFR BLD AUTO: 37.3 % (ref 35–47)
HGB BLD-MCNC: 11.4 G/DL (ref 11.5–16)
IMM GRANULOCYTES # BLD AUTO: 0.1 K/UL (ref 0–0.04)
IMM GRANULOCYTES NFR BLD AUTO: 1 % (ref 0–0.5)
LYMPHOCYTES # BLD: 1.6 K/UL (ref 0.8–3.5)
LYMPHOCYTES NFR BLD: 12 % (ref 12–49)
MCH RBC QN AUTO: 26.4 PG (ref 26–34)
MCHC RBC AUTO-ENTMCNC: 30.6 G/DL (ref 30–36.5)
MCV RBC AUTO: 86.3 FL (ref 80–99)
MONOCYTES # BLD: 0.5 K/UL (ref 0–1)
MONOCYTES NFR BLD: 4 % (ref 5–13)
NEUTS SEG # BLD: 10.7 K/UL (ref 1.8–8)
NEUTS SEG NFR BLD: 83 % (ref 32–75)
NRBC # BLD: 0 K/UL (ref 0–0.01)
NRBC BLD-RTO: 0 PER 100 WBC
PLATELET # BLD AUTO: 335 K/UL (ref 150–400)
PMV BLD AUTO: 11.1 FL (ref 8.9–12.9)
POTASSIUM SERPL-SCNC: 4 MMOL/L (ref 3.5–5.1)
RBC # BLD AUTO: 4.32 M/UL (ref 3.8–5.2)
SODIUM SERPL-SCNC: 135 MMOL/L (ref 136–145)
WBC # BLD AUTO: 12.9 K/UL (ref 3.6–11)

## 2024-04-25 PROCEDURE — 2700000000 HC OXYGEN THERAPY PER DAY

## 2024-04-25 PROCEDURE — 6360000002 HC RX W HCPCS: Performed by: STUDENT IN AN ORGANIZED HEALTH CARE EDUCATION/TRAINING PROGRAM

## 2024-04-25 PROCEDURE — 36415 COLL VENOUS BLD VENIPUNCTURE: CPT

## 2024-04-25 PROCEDURE — 6370000000 HC RX 637 (ALT 250 FOR IP): Performed by: STUDENT IN AN ORGANIZED HEALTH CARE EDUCATION/TRAINING PROGRAM

## 2024-04-25 PROCEDURE — 2580000003 HC RX 258: Performed by: STUDENT IN AN ORGANIZED HEALTH CARE EDUCATION/TRAINING PROGRAM

## 2024-04-25 PROCEDURE — 80048 BASIC METABOLIC PNL TOTAL CA: CPT

## 2024-04-25 PROCEDURE — 94761 N-INVAS EAR/PLS OXIMETRY MLT: CPT

## 2024-04-25 PROCEDURE — 85025 COMPLETE CBC W/AUTO DIFF WBC: CPT

## 2024-04-25 PROCEDURE — 94010 BREATHING CAPACITY TEST: CPT

## 2024-04-25 PROCEDURE — 94640 AIRWAY INHALATION TREATMENT: CPT

## 2024-04-25 PROCEDURE — 6360000002 HC RX W HCPCS: Performed by: INTERNAL MEDICINE

## 2024-04-25 PROCEDURE — 6370000000 HC RX 637 (ALT 250 FOR IP): Performed by: INTERNAL MEDICINE

## 2024-04-25 RX ORDER — AZITHROMYCIN 500 MG/1
500 TABLET, FILM COATED ORAL DAILY
Qty: 3 TABLET | Refills: 0 | Status: SHIPPED | OUTPATIENT
Start: 2024-04-25 | End: 2024-04-28

## 2024-04-25 RX ORDER — AMLODIPINE BESYLATE 2.5 MG/1
2.5 TABLET ORAL DAILY
Qty: 30 TABLET | Refills: 0 | Status: SHIPPED | OUTPATIENT
Start: 2024-04-25

## 2024-04-25 RX ORDER — BUDESONIDE AND FORMOTEROL FUMARATE 80; 4.5 UG/1; UG/1
2 AEROSOL, METERED RESPIRATORY (INHALATION)
Qty: 10.3 G | Refills: 0 | Status: SHIPPED | OUTPATIENT
Start: 2024-04-25

## 2024-04-25 RX ORDER — PREDNISONE 20 MG/1
TABLET ORAL
Qty: 18 TABLET | Refills: 0 | Status: SHIPPED | OUTPATIENT
Start: 2024-04-25

## 2024-04-25 RX ORDER — CARVEDILOL 6.25 MG/1
6.25 TABLET ORAL 2 TIMES DAILY WITH MEALS
Qty: 60 TABLET | Refills: 0 | Status: SHIPPED | OUTPATIENT
Start: 2024-04-25 | End: 2024-04-25 | Stop reason: HOSPADM

## 2024-04-25 RX ADMIN — AMLODIPINE BESYLATE 2.5 MG: 2.5 TABLET ORAL at 10:19

## 2024-04-25 RX ADMIN — ENOXAPARIN SODIUM 40 MG: 100 INJECTION SUBCUTANEOUS at 10:07

## 2024-04-25 RX ADMIN — IPRATROPIUM BROMIDE AND ALBUTEROL SULFATE 1 DOSE: .5; 3 SOLUTION RESPIRATORY (INHALATION) at 08:04

## 2024-04-25 RX ADMIN — HYDROCHLOROTHIAZIDE 25 MG: 25 TABLET ORAL at 10:07

## 2024-04-25 RX ADMIN — ASPIRIN 81 MG: 81 TABLET, COATED ORAL at 10:07

## 2024-04-25 RX ADMIN — CARVEDILOL 6.25 MG: 6.25 TABLET, FILM COATED ORAL at 10:07

## 2024-04-25 RX ADMIN — ARFORMOTEROL TARTRATE: 15 SOLUTION RESPIRATORY (INHALATION) at 08:10

## 2024-04-25 RX ADMIN — SERTRALINE HYDROCHLORIDE 50 MG: 50 TABLET ORAL at 10:07

## 2024-04-25 RX ADMIN — WATER 40 MG: 1 INJECTION INTRAMUSCULAR; INTRAVENOUS; SUBCUTANEOUS at 10:07

## 2024-04-25 ASSESSMENT — COPD QUESTIONNAIRES
CAT_TOTALSCORE: 30
QUESTION7_SLEEPQUALITY: 5
GOLD_GROUP: GROUP B
QUESTION6_LEAVINGHOUSE: 0
QUESTION5_HOMEACTIVITIES: 0
QUESTION2_CHESTPHLEGM: 5
QUESTION1_COUGHFREQUENCY: 5
QUESTION8_ENERGYLEVEL: 5
QUESTION3_CHESTTIGHTNESS: 5
QUESTION4_WALKINCLINE: 5

## 2024-04-25 ASSESSMENT — PULMONARY FUNCTION TESTS: PIF_VALUE: 40

## 2024-04-25 NOTE — PROGRESS NOTES
Bedside, Verbal, and Recorded shift change report given to Rosanna ELLINGTON (oncoming nurse) by Romelia ELLINGTON (offgoing nurse). Report included the following information MAR, Recent Results, and Med Rec Status.

## 2024-04-25 NOTE — CARE COORDINATION
Cleared for D/C from CM standpoint    Transition of Care Plan:    RUR: 16%  Prior Level of Functioning: independent, up ad nikolay  Disposition: home with family, new home O2 ([employer])  Follow up appointments: PCP, specialists as indicated   DME needed: home O2- portable tank delivered at bedside   Transportation at discharge: family, ETA 6PM  IM/IMM Medicare/ letter given: N/A  Is patient a [place] and connected with [facility]?    If yes, was [place] transfer form completed and [facility] notified?   Caregiver Contact: Amaya   Discharge Caregiver contacted prior to discharge? Pt contacted   Care Conference needed? no  Barriers to discharge:  none    Chart reviewed. CM aware of discharge order. Pt requiring home oxygen- CM submitted order to [employer] [facility] via [employer]. Order was accepted and CM delivered portable O2 tank at bedside. CM reviewed instructions as to who and when to call to set up O2 delivery after discharge. Pt is up ad nikolay- no therapy needs identified. Family to transport home this evening. No further CM needs identified.          04/25/24 2099   Services At/After Discharge   Transition of Care Consult (CM Consult) Discharge Planning   Services At/After Discharge DME   [place] Resource Information Provided? No   Mode of Transport at Discharge Other (see comment)  (family)   Hospital Transport Time of Discharge 1800   Confirm Follow Up Transport Family   Condition of Participation: Discharge Planning   The Plan for Transition of Care is related to the following treatment goals: home with outpatient follow up   The Patient and/or Patient Representative was provided with a Choice of Provider? Patient   The Patient and/Or Patient Representative agree with the Discharge Plan? Yes   Freedom of Choice list was provided with basic dialogue that supports the patient's individualized plan of care/goals, treatment preferences, and shares the quality data associated with the providers?  Yes       MARII Mcdaniel

## 2024-04-25 NOTE — PROGRESS NOTES
Pulse oximetry assessment   98% at rest on room air (if 88% or less, skip next steps)  82% while ambulating on room air  100% at rest on 2LPM  93% while ambulating on 2LPM

## 2024-04-25 NOTE — PROGRESS NOTES
Hospital follow-up PCP transitional care appointment has been scheduled with Dr. Star Napier on 5/13/24 1500. This is the first available appt due to limited provider availability. PCP office does not offer alternate provider option for hospital follow up. [facility] information [facility] for patient resource. Pending patient discharge. Daya Steel, Care Management Assistant

## 2024-04-25 NOTE — PROGRESS NOTES
Pulmonary Disease Navigator Note  [facility]    Current GOLD classification for Li Casanova    Patient's chart was reviewed by Pulmonary Disease Navigator for compliance with prescribed treatment with Global Initiative For Chronic Obstructive Lung Disease (GOLD).    Please, review beneath recommendations for pharmacological treatment for patient with obstructive lung disease.         Current Pharmacological Treatment:  albuterol sulfate HFA (PROVENTIL;VENTOLIN;PROAIR) 108 (90 Base) MCG/ACT inhaler     budesonide-formoterol (SYMBICORT) 80-4.5 MCG/ACT AERO  - patient advised that she does not have or use this inhaler        GOLD Stage:  Requires PFT documentation  Group: Group B  Current eosinophil count: 0  Recorded domestic exacerbations past 12 months:  (Patient advised that she really cant say, to many to count)        Observed PIF: 40 L/min (40lpm in-check dial for MDI)    Combination  ICS-LABA Inhaler Acceptable   Therapy  Device For Use   Salmeterol/fluticasone Advair  Diskus    Vilanterol/fluticasone  Breo  Ellipta    Formoterol/mometasone  Dulera MDI Yes   Formoterol/budesonide  Symbicort MDI Yes   Triple Therapy Recommended (For Group E) [id number]     Fluticasone/umeclidinium/vilanterol  Trelegy  Ellipta    Budesonide/glycopyrrolate/formoterol fumarate  Breztri  MDI Yes   Recommended (For Group A & B) LAMA/LABA     Vilanterol/umeclidinium  Anoro  Ellipta    Olodaterol/tiotropium  Stiolto  Respimat Yes   Formoterol/glycopyrronium  Bevespi  MDI Yes   Aclidinium/formoterol Duaklir  Pressair      *Nebulizer Options    LAMA LABA ICS   Alejandrapeliborio  Brovanalyssa Budesonide    Performist      The CAT provides a reliable measure of the impact of COPD on a patient's health status. Range of CAT scores from 0-40. Higher scores denote a more severe impact of COPD on a patient’s life. Scores <10 have a low impact, 10-20 medium, 21-30 high and >30 very high impact, requiring gradually more  interventions.     Current recorded COPD Assessment Tool (CAT) score of Cough Assessment: 5  Phlegm Assessment: 5  Chest tightness: 5  Walking on an incline: 5  Home Activities: 0  Confident Leaving The Home: 0  Sleeping Soundly: 5  Have Energy: 5  Assessment Score: 30       None pharmacological recommendations:     [] ABG  [x] 6MW  [] Sleep study  [] NIV  [] Pulmonary Rehab  [x] PFT on discharge  [] Smoking Cessation  []Pulmonary Consult  []Palliative Care Consult  []Low Dose Lung CT (LDCT)      Patient Education:      Understanding COPD  Exacerbations/Action Plan  Triggers  Precautions  Smoking Cessation  Inhaler/Nebulizer use (Spacer)  Incentive Spirometry  Breathing Techniques  Managing Stress  Educational resources provided: “Managing Your COPD\" Booklet.      Comments:     Electronically signed by RT Mauri on 4/25/24 at 9:55 AM EDT

## 2024-04-25 NOTE — DISCHARGE SUMMARY
on discharge day.  Pertinent Findings:  Patient Vitals for the past 24 hrs:   BP Temp Temp src Pulse Resp SpO2   04/25/24 1000 (!) 140/86 -- -- 87 -- --   04/25/24 0804 (!) 140/86 98.4 °F (36.9 °C) Oral 87 18 100 %   04/24/24 2157 (!) 143/87 -- -- 84 -- 99 %   04/24/24 2044 -- -- -- 54 16 99 %   04/24/24 2008 (!) 166/94 97.7 °F (36.5 °C) Oral 87 17 100 %   04/24/24 1857 (!) 154/85 -- -- 90 -- --       Gen:    Not in distress  Chest: Clear lungs  CVS:   Regular rhythm.  No edema  Abd:  Soft, not distended, not tender  Neuro: awake, moving all exts    Discharge/Recent Laboratory Results:  Recent Labs     04/25/24 0435   *   K 4.0      CO2 28   BUN 21*   CREATININE 0.77   GLUCOSE 161*   CALCIUM 10.3*     Recent Labs     04/25/24 0435   HGB 11.4*   HCT 37.3   WBC 12.9*          Discharge Medications:     Medication List        START taking these medications      azithromycin 500 MG tablet  Commonly known as: ZITHROMAX  Take 1 tablet by mouth daily for 3 days     metoprolol tartrate 25 MG tablet  Commonly known as: LOPRESSOR  Take 1 tablet by mouth 2 times daily     predniSONE 20 MG tablet  Commonly known as: DELTASONE  Take 3 tabs daily for 3 days then 2 tabs daily for 3 days then 1 tab daily for 3 days then STOP            CHANGE how you take these medications      atorvastatin 20 MG tablet  Commonly known as: LIPITOR  What changed: Another medication with the same name was removed. Continue taking this medication, and follow the directions you see here.     Breyna 80-4.5 MCG/ACT Aero  Generic drug: budesonide-formoterol  Inhale 2 puffs into the lungs in the morning and 2 puffs in the evening.  What changed: when to take this            CONTINUE taking these medications      albuterol sulfate  (90 Base) MCG/ACT inhaler  Commonly known as: PROVENTIL;VENTOLIN;PROAIR     amLODIPine 2.5 MG tablet  Commonly known as: NORVASC  Take 1 tablet by mouth daily     ASPIRIN 81 PO     cetirizine 10 MG

## 2024-04-25 NOTE — DISCHARGE INSTRUCTIONS
HOSPITALIST DISCHARGE INSTRUCTIONS    NAME: Li Casanova   :  1960   MRN:  [id number]     Date/Time:  2024 2:58 PM    ADMIT DATE: 2024     DISCHARGE DATE: 2024     DISCHARGE DIAGNOSIS:  Acute on chronic resp failure with Hypoxia POA- sent home on oxygen 2L/m NC for now, OP Pulm followup in 1-2 weeks  COPD exacerbation  Pulmonary nodule POA  CAD  History of stroke POA with hemorrhagic conversion  With residual aphasia confusion BLE weakness  HTN  HLD  Full code    MEDICATIONS:  As per medication reconciliation  list  It is important that you take the medication exactly as they are prescribed.   Keep your medication in the bottles provided by the pharmacist and keep a list of the medication names, dosages, and times to be taken in your wallet.   Do not take other medications without consulting your doctor.     Pain Management: per above medications    What to do at Home    Recommended diet:  cardiac diet    Recommended activity: activity as tolerated    If you have questions regarding the hospital related prescriptions or hospital related issues please call at .    If you experience any of the following symptoms then please call your primary care physician or return to the emergency room if you cannot get hold of your doctor:  Fever, chills, nausea, vomiting, diarrhea, change in mentation, falling, bleeding, shortness of breath,     Follow Up:  PCP you are to call and set up an appointment to see them in 7-10 days.  Pulm Dr Christianson in 2 weeks      Information obtained by :  I understand that if any problems occur once I am at home I am to contact my physician.    I understand and acknowledge receipt of the instructions indicated above.                                                                                                                                           Physician's or R.N.'s Signature

## 2024-04-25 NOTE — PROGRESS NOTES
IV removed, AVS reviewed, questions answered. Home O2 equipment explained and pt expressed understanding. D/c home with family

## 2024-04-25 NOTE — PLAN OF CARE
Problem: Safety - Adult  Goal: Free from fall injury  Outcome: Progressing  Flowsheets (Taken 4/25/2024 0019)  Free From Fall Injury:   Instruct family/caregiver on patient safety   Based on caregiver fall risk screen, instruct family/caregiver to ask for assistance with transferring infant if caregiver noted to have fall risk factors

## 2024-05-17 ENCOUNTER — HOSPITAL ENCOUNTER (EMERGENCY)
Facility: HOSPITAL | Age: 64
Discharge: HOME | End: 2024-05-18
Attending: EMERGENCY MEDICINE
Payer: MEDICAID

## 2024-05-17 ENCOUNTER — APPOINTMENT (OUTPATIENT)
Facility: HOSPITAL | Age: 64
End: 2024-05-17
Payer: MEDICAID

## 2024-05-17 VITALS
BODY MASS INDEX: 25.28 KG/M2 | RESPIRATION RATE: 25 BRPM | OXYGEN SATURATION: 98 % | SYSTOLIC BLOOD PRESSURE: 137 MMHG | TEMPERATURE: 99.1 F | WEIGHT: 125.22 LBS | HEART RATE: 116 BPM | DIASTOLIC BLOOD PRESSURE: 59 MMHG

## 2024-05-17 DIAGNOSIS — J44.1 COPD EXACERBATION (HCC): Primary | ICD-10-CM

## 2024-05-17 LAB
ALBUMIN SERPL-MCNC: 3.4 G/DL (ref 3.5–5)
ALBUMIN/GLOB SERPL: 0.8 (ref 1.1–2.2)
ALP SERPL-CCNC: 72 U/L (ref 45–117)
ALT SERPL-CCNC: 20 U/L (ref 12–78)
ANION GAP SERPL CALC-SCNC: 6 MMOL/L (ref 5–15)
AST SERPL-CCNC: 12 U/L (ref 15–37)
BASOPHILS # BLD: 0 K/UL (ref 0–0.1)
BASOPHILS NFR BLD: 0 % (ref 0–1)
BILIRUB SERPL-MCNC: 0.1 MG/DL (ref 0.2–1)
BUN SERPL-MCNC: 11 MG/DL (ref 6–20)
BUN/CREAT SERPL: 13 (ref 12–20)
CALCIUM SERPL-MCNC: 9.3 MG/DL (ref 8.5–10.1)
CHLORIDE SERPL-SCNC: 111 MMOL/L (ref 97–108)
CO2 SERPL-SCNC: 26 MMOL/L (ref 21–32)
CREAT SERPL-MCNC: 0.85 MG/DL (ref 0.55–1.02)
DIFFERENTIAL METHOD BLD: ABNORMAL
EOSINOPHIL # BLD: 0.1 K/UL (ref 0–0.4)
EOSINOPHIL NFR BLD: 1 % (ref 0–7)
ERYTHROCYTE [DISTWIDTH] IN BLOOD BY AUTOMATED COUNT: 18.2 % (ref 11.5–14.5)
GLOBULIN SER CALC-MCNC: 4.3 G/DL (ref 2–4)
GLUCOSE SERPL-MCNC: 111 MG/DL (ref 65–100)
HCT VFR BLD AUTO: 28.9 % (ref 35–47)
HGB BLD-MCNC: 8.7 G/DL (ref 11.5–16)
IMM GRANULOCYTES # BLD AUTO: 0 K/UL (ref 0–0.04)
IMM GRANULOCYTES NFR BLD AUTO: 0 % (ref 0–0.5)
LYMPHOCYTES # BLD: 1.5 K/UL (ref 0.8–3.5)
LYMPHOCYTES NFR BLD: 21 % (ref 12–49)
MCH RBC QN AUTO: 26.4 PG (ref 26–34)
MCHC RBC AUTO-ENTMCNC: 30.1 G/DL (ref 30–36.5)
MCV RBC AUTO: 87.6 FL (ref 80–99)
MONOCYTES # BLD: 0.4 K/UL (ref 0–1)
MONOCYTES NFR BLD: 6 % (ref 5–13)
NEUTS SEG # BLD: 4.9 K/UL (ref 1.8–8)
NEUTS SEG NFR BLD: 71 % (ref 32–75)
NRBC # BLD: 0 K/UL (ref 0–0.01)
NRBC BLD-RTO: 0 PER 100 WBC
PLATELET # BLD AUTO: 313 K/UL (ref 150–400)
PMV BLD AUTO: 10.3 FL (ref 8.9–12.9)
POTASSIUM SERPL-SCNC: 3.7 MMOL/L (ref 3.5–5.1)
PROT SERPL-MCNC: 7.7 G/DL (ref 6.4–8.2)
RBC # BLD AUTO: 3.3 M/UL (ref 3.8–5.2)
SODIUM SERPL-SCNC: 143 MMOL/L (ref 136–145)
TROPONIN I SERPL HS-MCNC: 17 NG/L (ref 0–51)
WBC # BLD AUTO: 7 K/UL (ref 3.6–11)

## 2024-05-17 PROCEDURE — 36415 COLL VENOUS BLD VENIPUNCTURE: CPT

## 2024-05-17 PROCEDURE — 6360000002 HC RX W HCPCS: Performed by: EMERGENCY MEDICINE

## 2024-05-17 PROCEDURE — 84484 ASSAY OF TROPONIN QUANT: CPT

## 2024-05-17 PROCEDURE — 80053 COMPREHEN METABOLIC PANEL: CPT

## 2024-05-17 PROCEDURE — 99285 EMERGENCY DEPT VISIT HI MDM: CPT

## 2024-05-17 PROCEDURE — 71045 X-RAY EXAM CHEST 1 VIEW: CPT

## 2024-05-17 PROCEDURE — 93005 ELECTROCARDIOGRAM TRACING: CPT | Performed by: EMERGENCY MEDICINE

## 2024-05-17 PROCEDURE — 96374 THER/PROPH/DIAG INJ IV PUSH: CPT

## 2024-05-17 PROCEDURE — 85025 COMPLETE CBC W/AUTO DIFF WBC: CPT

## 2024-05-17 PROCEDURE — 2580000003 HC RX 258: Performed by: EMERGENCY MEDICINE

## 2024-05-17 PROCEDURE — 2700000000 HC OXYGEN THERAPY PER DAY

## 2024-05-17 RX ORDER — ALBUTEROL SULFATE 90 UG/1
2 AEROSOL, METERED RESPIRATORY (INHALATION) EVERY 4 HOURS PRN
Qty: 18 G | Refills: 0 | Status: SHIPPED | OUTPATIENT
Start: 2024-05-17

## 2024-05-17 RX ADMIN — METHYLPREDNISOLONE SODIUM SUCCINATE 125 MG: 125 INJECTION INTRAMUSCULAR; INTRAVENOUS at 19:51

## 2024-05-18 PROCEDURE — 2700000000 HC OXYGEN THERAPY PER DAY

## 2024-05-18 NOTE — ED NOTES
Patient states she doesn't know where she can go. She is calling a friend, Amaya. She didn't know her number so it was pulled from the chart. Patient given the number and asked to call this person to get her address for discharge. Patient asked to ring call bell when she has a place to go.

## 2024-05-18 NOTE — ED PROVIDER NOTES
discussed.  She also agrees with the care-plan and conveys that all of her questions have been answered.  I have also provided discharge instructions for her that include: educational information regarding their diagnosis and treatment, and list of reasons why they would want to return to the ED prior to their follow-up appointment, should her condition change.     CLINICAL IMPRESSION    {Disposition (Optional):46014}     PATIENT REFERRED TO:  No follow-up provider specified.     DISCHARGE MEDICATIONS:     Medication List        CONTINUE taking these medications      Compressor/Nebulizer Misc  1 Device by Does not apply route once for 1 dose            ASK your doctor about these medications      albuterol sulfate  (90 Base) MCG/ACT inhaler  Commonly known as: PROVENTIL;VENTOLIN;PROAIR     amLODIPine 2.5 MG tablet  Commonly known as: NORVASC  Take 1 tablet by mouth daily     ASPIRIN 81 PO     atorvastatin 20 MG tablet  Commonly known as: LIPITOR     Breyna 80-4.5 MCG/ACT Aero  Generic drug: budesonide-formoterol  Inhale 2 puffs into the lungs in the morning and 2 puffs in the evening.     cetirizine 10 MG tablet  Commonly known as: ZYRTEC     clopidogrel 75 MG tablet  Commonly known as: PLAVIX     docusate sodium 100 MG capsule  Commonly known as: COLACE     ferrous sulfate 325 (65 Fe) MG EC tablet  Commonly known as: FE TABS 325     folic acid 1 MG tablet  Commonly known as: FOLVITE     hydroCHLOROthiazide 25 MG tablet  Commonly known as: HYDRODIURIL     metoprolol tartrate 25 MG tablet  Commonly known as: LOPRESSOR  Take 1 tablet by mouth 2 times daily     predniSONE 20 MG tablet  Commonly known as: DELTASONE  Take 3 tabs daily for 3 days then 2 tabs daily for 3 days then 1 tab daily for 3 days then STOP     sertraline 50 MG tablet  Commonly known as: ZOLOFT     traZODone 50 MG tablet  Commonly known as: DESYREL     Vitron-C  MG Tabs  Generic drug: iron-vitamin C                DISCONTINUED  carefully, and ask your doctor or other care provider to review them with you.                CONTINUE taking these medications      Compressor/Nebulizer Misc  1 Device by Does not apply route once for 1 dose            ASK your doctor about these medications      amLODIPine 2.5 MG tablet  Commonly known as: NORVASC  Take 1 tablet by mouth daily     ASPIRIN 81 PO     atorvastatin 20 MG tablet  Commonly known as: LIPITOR     Breyna 80-4.5 MCG/ACT Aero  Generic drug: budesonide-formoterol  Inhale 2 puffs into the lungs in the morning and 2 puffs in the evening.     cetirizine 10 MG tablet  Commonly known as: ZYRTEC     clopidogrel 75 MG tablet  Commonly known as: PLAVIX     docusate sodium 100 MG capsule  Commonly known as: COLACE     ferrous sulfate 325 (65 Fe) MG EC tablet  Commonly known as: FE TABS 325     folic acid 1 MG tablet  Commonly known as: FOLVITE     hydroCHLOROthiazide 25 MG tablet  Commonly known as: HYDRODIURIL     metoprolol tartrate 25 MG tablet  Commonly known as: LOPRESSOR  Take 1 tablet by mouth 2 times daily     predniSONE 20 MG tablet  Commonly known as: DELTASONE  Take 3 tabs daily for 3 days then 2 tabs daily for 3 days then 1 tab daily for 3 days then STOP     sertraline 50 MG tablet  Commonly known as: ZOLOFT     traZODone 50 MG tablet  Commonly known as: DESYREL     Vitron-C  MG Tabs  Generic drug: iron-vitamin C               Where to Get Your Medications        These medications were sent to [id number] - [place], [place] - [address] - P [phone] - F [phone]  [facility] [facility] [address]      Phone: [phone]   albuterol (5 MG/ML) 0.5% nebulizer solution  albuterol sulfate  (90 Base) MCG/ACT inhaler           DISCONTINUED MEDICATIONS:  Current Discharge Medication List          I am the Primary Clinician of Record.   Naresh Curtis MD (electronically signed)    (Please note that parts of this dictation were completed with

## 2024-05-18 NOTE — ED NOTES
Patient states she does not know the address of the place she came from, nor does she know if she is allowed to stay there. She is on home O2, when asked where her home O2 is located, she stated, \"I don't know\". When asked if she was homeless, she stated, \"No\". When asked if she had a place to stay, the patient stated, \"I don't know.\" Patient then asked to go to the bathroom. Portable O2 provided for her. Patient has no answers and no ideas on where she can stay tonight.

## 2024-05-18 NOTE — ED NOTES
[place] [facility] contacted and the patient's 911 address was obtained. It is [address] [place] [address] [address]. Patient's caretaker was called several times with no answer. Patient states that where she was picked up from and is where she is staying and she is allowed back. EMS transport will be arranged.

## 2024-05-19 LAB
EKG ATRIAL RATE: 114 BPM
EKG DIAGNOSIS: NORMAL
EKG P AXIS: 53 DEGREES
EKG P-R INTERVAL: 144 MS
EKG Q-T INTERVAL: 328 MS
EKG QRS DURATION: 68 MS
EKG QTC CALCULATION (BAZETT): 452 MS
EKG R AXIS: 13 DEGREES
EKG T AXIS: 32 DEGREES
EKG VENTRICULAR RATE: 114 BPM

## 2024-05-25 ENCOUNTER — HOSPITAL ENCOUNTER (EMERGENCY)
Facility: HOSPITAL | Age: 64
Discharge: HOME | End: 2024-05-25
Attending: STUDENT IN AN ORGANIZED HEALTH CARE EDUCATION/TRAINING PROGRAM
Payer: MEDICAID

## 2024-05-25 ENCOUNTER — APPOINTMENT (OUTPATIENT)
Facility: HOSPITAL | Age: 64
End: 2024-05-25
Payer: MEDICAID

## 2024-05-25 VITALS
DIASTOLIC BLOOD PRESSURE: 66 MMHG | WEIGHT: 129.19 LBS | HEART RATE: 73 BPM | SYSTOLIC BLOOD PRESSURE: 114 MMHG | RESPIRATION RATE: 19 BRPM | BODY MASS INDEX: 27.12 KG/M2 | TEMPERATURE: 99.3 F | HEIGHT: 58 IN | OXYGEN SATURATION: 98 %

## 2024-05-25 DIAGNOSIS — J44.1 COPD EXACERBATION (HCC): Primary | ICD-10-CM

## 2024-05-25 LAB
ALBUMIN SERPL-MCNC: 3.8 G/DL (ref 3.5–5)
ALBUMIN/GLOB SERPL: 1 (ref 1.1–2.2)
ALP SERPL-CCNC: 72 U/L (ref 45–117)
ALT SERPL-CCNC: 23 U/L (ref 12–78)
ANION GAP SERPL CALC-SCNC: 5 MMOL/L (ref 5–15)
AST SERPL-CCNC: 16 U/L (ref 15–37)
BASOPHILS # BLD: 0.1 K/UL (ref 0–0.1)
BASOPHILS NFR BLD: 1 % (ref 0–1)
BILIRUB SERPL-MCNC: 0.2 MG/DL (ref 0.2–1)
BUN SERPL-MCNC: 10 MG/DL (ref 6–20)
BUN/CREAT SERPL: 11 (ref 12–20)
CALCIUM SERPL-MCNC: 9.3 MG/DL (ref 8.5–10.1)
CHLORIDE SERPL-SCNC: 104 MMOL/L (ref 97–108)
CO2 SERPL-SCNC: 30 MMOL/L (ref 21–32)
CREAT SERPL-MCNC: 0.93 MG/DL (ref 0.55–1.02)
DIFFERENTIAL METHOD BLD: ABNORMAL
EOSINOPHIL # BLD: 0.1 K/UL (ref 0–0.4)
EOSINOPHIL NFR BLD: 1 % (ref 0–7)
ERYTHROCYTE [DISTWIDTH] IN BLOOD BY AUTOMATED COUNT: 18.8 % (ref 11.5–14.5)
FLUAV RNA SPEC QL NAA+PROBE: NOT DETECTED
FLUBV RNA SPEC QL NAA+PROBE: NOT DETECTED
GLOBULIN SER CALC-MCNC: 4 G/DL (ref 2–4)
GLUCOSE SERPL-MCNC: 139 MG/DL (ref 65–100)
HCT VFR BLD AUTO: 30.9 % (ref 35–47)
HGB BLD-MCNC: 9.2 G/DL (ref 11.5–16)
IMM GRANULOCYTES # BLD AUTO: 0.1 K/UL (ref 0–0.04)
IMM GRANULOCYTES NFR BLD AUTO: 2 % (ref 0–0.5)
LYMPHOCYTES # BLD: 1.7 K/UL (ref 0.8–3.5)
LYMPHOCYTES NFR BLD: 23 % (ref 12–49)
MCH RBC QN AUTO: 25.5 PG (ref 26–34)
MCHC RBC AUTO-ENTMCNC: 29.8 G/DL (ref 30–36.5)
MCV RBC AUTO: 85.6 FL (ref 80–99)
MONOCYTES # BLD: 0.4 K/UL (ref 0–1)
MONOCYTES NFR BLD: 6 % (ref 5–13)
NEUTS SEG # BLD: 5 K/UL (ref 1.8–8)
NEUTS SEG NFR BLD: 67 % (ref 32–75)
NRBC # BLD: 0 K/UL (ref 0–0.01)
NRBC BLD-RTO: 0 PER 100 WBC
PLATELET # BLD AUTO: 372 K/UL (ref 150–400)
PMV BLD AUTO: 10.3 FL (ref 8.9–12.9)
POTASSIUM SERPL-SCNC: 3.8 MMOL/L (ref 3.5–5.1)
PROT SERPL-MCNC: 7.8 G/DL (ref 6.4–8.2)
RBC # BLD AUTO: 3.61 M/UL (ref 3.8–5.2)
RBC MORPH BLD: ABNORMAL
SARS-COV-2 RNA RESP QL NAA+PROBE: NOT DETECTED
SODIUM SERPL-SCNC: 139 MMOL/L (ref 136–145)
TROPONIN I SERPL HS-MCNC: 14 NG/L (ref 0–51)
WBC # BLD AUTO: 7.4 K/UL (ref 3.6–11)

## 2024-05-25 PROCEDURE — 85025 COMPLETE CBC W/AUTO DIFF WBC: CPT

## 2024-05-25 PROCEDURE — 2700000000 HC OXYGEN THERAPY PER DAY

## 2024-05-25 PROCEDURE — 6370000000 HC RX 637 (ALT 250 FOR IP): Performed by: STUDENT IN AN ORGANIZED HEALTH CARE EDUCATION/TRAINING PROGRAM

## 2024-05-25 PROCEDURE — 6370000000 HC RX 637 (ALT 250 FOR IP)

## 2024-05-25 PROCEDURE — 84484 ASSAY OF TROPONIN QUANT: CPT

## 2024-05-25 PROCEDURE — 94640 AIRWAY INHALATION TREATMENT: CPT

## 2024-05-25 PROCEDURE — 36415 COLL VENOUS BLD VENIPUNCTURE: CPT

## 2024-05-25 PROCEDURE — 71045 X-RAY EXAM CHEST 1 VIEW: CPT

## 2024-05-25 PROCEDURE — 96365 THER/PROPH/DIAG IV INF INIT: CPT

## 2024-05-25 PROCEDURE — 80053 COMPREHEN METABOLIC PANEL: CPT

## 2024-05-25 PROCEDURE — 87636 SARSCOV2 & INF A&B AMP PRB: CPT

## 2024-05-25 PROCEDURE — 6360000002 HC RX W HCPCS: Performed by: STUDENT IN AN ORGANIZED HEALTH CARE EDUCATION/TRAINING PROGRAM

## 2024-05-25 PROCEDURE — 6370000000 HC RX 637 (ALT 250 FOR IP): Performed by: EMERGENCY MEDICINE

## 2024-05-25 PROCEDURE — 93005 ELECTROCARDIOGRAM TRACING: CPT | Performed by: STUDENT IN AN ORGANIZED HEALTH CARE EDUCATION/TRAINING PROGRAM

## 2024-05-25 PROCEDURE — 99285 EMERGENCY DEPT VISIT HI MDM: CPT

## 2024-05-25 RX ORDER — PREDNISONE 20 MG/1
40 TABLET ORAL DAILY
Qty: 10 TABLET | Refills: 0 | Status: SHIPPED | OUTPATIENT
Start: 2024-05-25 | End: 2024-05-30

## 2024-05-25 RX ORDER — MAGNESIUM SULFATE 1 G/100ML
1000 INJECTION INTRAVENOUS
Status: DISCONTINUED | OUTPATIENT
Start: 2024-05-25 | End: 2024-05-25

## 2024-05-25 RX ORDER — IPRATROPIUM BROMIDE AND ALBUTEROL SULFATE 2.5; .5 MG/3ML; MG/3ML
1 SOLUTION RESPIRATORY (INHALATION)
Status: COMPLETED | OUTPATIENT
Start: 2024-05-25 | End: 2024-05-25

## 2024-05-25 RX ORDER — ALBUTEROL SULFATE 90 UG/1
2 AEROSOL, METERED RESPIRATORY (INHALATION) EVERY 6 HOURS PRN
Qty: 18 G | Refills: 3 | Status: SHIPPED | OUTPATIENT
Start: 2024-05-25

## 2024-05-25 RX ORDER — PREDNISONE 20 MG/1
40 TABLET ORAL ONCE
Status: COMPLETED | OUTPATIENT
Start: 2024-05-25 | End: 2024-05-25

## 2024-05-25 RX ORDER — IPRATROPIUM BROMIDE AND ALBUTEROL SULFATE 2.5; .5 MG/3ML; MG/3ML
SOLUTION RESPIRATORY (INHALATION)
Status: COMPLETED
Start: 2024-05-25 | End: 2024-05-25

## 2024-05-25 RX ADMIN — IPRATROPIUM BROMIDE AND ALBUTEROL SULFATE 1 DOSE: .5; 3 SOLUTION RESPIRATORY (INHALATION) at 01:40

## 2024-05-25 RX ADMIN — IPRATROPIUM BROMIDE AND ALBUTEROL SULFATE 1 DOSE: .5; 3 SOLUTION RESPIRATORY (INHALATION) at 01:44

## 2024-05-25 RX ADMIN — IPRATROPIUM BROMIDE AND ALBUTEROL SULFATE 1 DOSE: .5; 3 SOLUTION RESPIRATORY (INHALATION) at 07:15

## 2024-05-25 RX ADMIN — IPRATROPIUM BROMIDE AND ALBUTEROL SULFATE 1 DOSE: .5; 3 SOLUTION RESPIRATORY (INHALATION) at 01:43

## 2024-05-25 RX ADMIN — MAGNESIUM SULFATE IN DEXTROSE 1000 MG: 10 INJECTION, SOLUTION INTRAVENOUS at 02:50

## 2024-05-25 RX ADMIN — PREDNISONE 40 MG: 20 TABLET ORAL at 02:35

## 2024-05-25 ASSESSMENT — PAIN - FUNCTIONAL ASSESSMENT: PAIN_FUNCTIONAL_ASSESSMENT: 0-10

## 2024-05-25 ASSESSMENT — PAIN SCALES - GENERAL: PAINLEVEL_OUTOF10: 0

## 2024-05-25 ASSESSMENT — LIFESTYLE VARIABLES
HOW MANY STANDARD DRINKS CONTAINING ALCOHOL DO YOU HAVE ON A TYPICAL DAY: 1 OR 2
HOW OFTEN DO YOU HAVE A DRINK CONTAINING ALCOHOL: 2-4 TIMES A MONTH

## 2024-05-25 NOTE — PLAN OF CARE
Problem: Respiratory - Adult  Goal: Achieves optimal ventilation and oxygenation  Outcome: Progressing  Flowsheets (Taken 5/25/2024 2931)  Achieves optimal ventilation and oxygenation:   Assess for changes in respiratory status   Position to facilitate oxygenation and minimize respiratory effort   Respiratory therapy support as indicated   Oxygen supplementation based on oxygen saturation or arterial blood gases   Assess and instruct to report shortness of breath or any respiratory difficulty

## 2024-05-25 NOTE — ED PROVIDER NOTES
[facility] EMERGENCY DEPT  EMERGENCY DEPARTMENT ENCOUNTER       Pt Name: Li Casanova  MRN: [id number]  Birthdate 1960  Date of evaluation: 5/25/2024  Provider: Koby Hester MD   PCP: Star Napier MD  Note Started: 1:29 AM EDT 5/25/24     CHIEF COMPLAINT       Chief Complaint   Patient presents with    Shortness of Breath     Pt BIBA from home with c/o shortness of breath.  Pt reports hx of COPD and has been using her nebulizer/inhalers with some success, is on O2 2LPM at baseline.  EMS states they increased pt's O2 to 4LPM and spO2 went to 97%, pt declined breathing treatment during transport.     HISTORY OF PRESENT ILLNESS: 1 or more elements      History From: patient, History limited by: none     Li Casanova is a 63 y.o. female with history of asthma and COPD presents emergency department with shortness of breath and dry cough.  She reports that she is on 2 L via nasal cannula at baseline.  Oxygen was increased slightly in a row to 4 L.  Per EMS she required an breathing treatment during transport since she felt improved.  No recent fevers or chills.  She reports that she feels this way about every week.  She has never required hospitalization or intubation in the past for asthma or COPD exacerbations.      Hx of crack cocaine use per chart review.    Please See MDM for Additional Details of the HPI/PMH  Nursing Notes were all reviewed and agreed with or any disagreements were addressed in the HPI.     REVIEW OF SYSTEMS        Positives and Pertinent negatives as per HPI.    PAST HISTORY     Past Medical History:  Past Medical History:   Diagnosis Date    Asthma     CVA (cerebral vascular accident) (HCC)     Drug abuse ([facility])     states, \"I do crack every once in a while\"       Past Surgical History:  History reviewed. No pertinent surgical history.    Family History:  History reviewed. No pertinent family history.    Social History:  Social History     Tobacco Use    Smoking status: Every Day      0.55 - 1.02 MG/DL    BUN/Creatinine Ratio 11 (L) 12 - 20      Est, Glom Filt Rate 69 >60 ml/min/1.73m2    Calcium 9.3 8.5 - 10.1 MG/DL    Total Bilirubin 0.2 0.2 - 1.0 MG/DL    ALT 23 12 - 78 U/L    AST 16 15 - 37 U/L    Alk Phosphatase 72 45 - 117 U/L    Total Protein 7.8 6.4 - 8.2 g/dL    Albumin 3.8 3.5 - 5.0 g/dL    Globulin 4.0 2.0 - 4.0 g/dL    Albumin/Globulin Ratio 1.0 (L) 1.1 - 2.2     Troponin    Collection Time: 05/25/24  1:41 AM   Result Value Ref Range    Troponin, High Sensitivity 14 0 - 51 ng/L   COVID-19 & Influenza Combo    Collection Time: 05/25/24  1:41 AM    Specimen: Nasopharyngeal   Result Value Ref Range    SARS-CoV-2, PCR Not detected NOTD      Rapid Influenza A By PCR Not detected NOTD      Rapid Influenza B By PCR Not detected NOTD         EKG: If performed, independent interpretation documented below in the MDM section     RADIOLOGY:  Non-plain film images such as CT, Ultrasound and MRI are read by the radiologist. Plain radiographic images are visualized and preliminarily interpreted by the ED Provider with the findings documented in the MDM section.     Interpretation per the Radiologist below, if available at the time of this note:     XR CHEST PORTABLE   Final Result   No acute process.           PROCEDURES   Unless otherwise noted below, none  Procedures     CRITICAL CARE TIME   None    EMERGENCY DEPARTMENT COURSE and DIFFERENTIAL DIAGNOSIS/MDM   Vitals:    Vitals:    05/25/24 0126 05/25/24 0143 05/25/24 0236   BP: 113/75     Pulse: 95     Resp: 24     Temp: 99.3 °F (37.4 °C)     TempSrc: Oral     SpO2: 96% 97% 97%   Weight: 58.6 kg (129 lb 3 oz)     Height: 1.473 m (4' 10\")          Patient was given the following medications:  Medications   ipratropium 0.5 mg-albuterol 2.5 mg (DUONEB) nebulizer solution 1 Dose (1 Dose Inhalation Given 5/25/24 0144)   predniSONE (DELTASONE) tablet 40 mg (40 mg Oral Given 5/25/24 0235)     Medical Decision Making  Patient is a 63-year-old female with

## 2024-05-26 LAB
EKG ATRIAL RATE: 89 BPM
EKG DIAGNOSIS: NORMAL
EKG P AXIS: 68 DEGREES
EKG P-R INTERVAL: 150 MS
EKG Q-T INTERVAL: 354 MS
EKG QRS DURATION: 68 MS
EKG QTC CALCULATION (BAZETT): 430 MS
EKG R AXIS: 54 DEGREES
EKG T AXIS: 54 DEGREES
EKG VENTRICULAR RATE: 89 BPM

## 2024-06-05 ENCOUNTER — HOSPITAL ENCOUNTER (EMERGENCY)
Facility: HOSPITAL | Age: 64
Discharge: HOME | End: 2024-06-05
Attending: EMERGENCY MEDICINE
Payer: MEDICAID

## 2024-06-05 ENCOUNTER — APPOINTMENT (OUTPATIENT)
Facility: HOSPITAL | Age: 64
End: 2024-06-05
Payer: MEDICAID

## 2024-06-05 VITALS
OXYGEN SATURATION: 100 % | HEART RATE: 92 BPM | TEMPERATURE: 98.1 F | RESPIRATION RATE: 19 BRPM | HEIGHT: 59 IN | WEIGHT: 130 LBS | SYSTOLIC BLOOD PRESSURE: 154 MMHG | DIASTOLIC BLOOD PRESSURE: 77 MMHG | BODY MASS INDEX: 26.21 KG/M2

## 2024-06-05 DIAGNOSIS — J44.1 COPD EXACERBATION (HCC): Primary | ICD-10-CM

## 2024-06-05 DIAGNOSIS — R06.02 SHORTNESS OF BREATH: ICD-10-CM

## 2024-06-05 LAB
ALBUMIN SERPL-MCNC: 3.9 G/DL (ref 3.5–5)
ALBUMIN/GLOB SERPL: 1 (ref 1.1–2.2)
ALP SERPL-CCNC: 67 U/L (ref 45–117)
ALT SERPL-CCNC: 23 U/L (ref 12–78)
AMPHET UR QL SCN: NEGATIVE
ANION GAP SERPL CALC-SCNC: 4 MMOL/L (ref 5–15)
AST SERPL-CCNC: 15 U/L (ref 15–37)
BARBITURATES UR QL SCN: NEGATIVE
BASOPHILS # BLD: 0.1 K/UL (ref 0–0.1)
BASOPHILS NFR BLD: 1 % (ref 0–1)
BENZODIAZ UR QL: NEGATIVE
BILIRUB SERPL-MCNC: 0.2 MG/DL (ref 0.2–1)
BUN SERPL-MCNC: 17 MG/DL (ref 6–20)
BUN/CREAT SERPL: 19 (ref 12–20)
CALCIUM SERPL-MCNC: 9.5 MG/DL (ref 8.5–10.1)
CANNABINOIDS UR QL SCN: NEGATIVE
CHLORIDE SERPL-SCNC: 104 MMOL/L (ref 97–108)
CO2 SERPL-SCNC: 30 MMOL/L (ref 21–32)
COCAINE UR QL SCN: NEGATIVE
CREAT SERPL-MCNC: 0.91 MG/DL (ref 0.55–1.02)
DIFFERENTIAL METHOD BLD: ABNORMAL
EKG ATRIAL RATE: 113 BPM
EKG DIAGNOSIS: NORMAL
EKG P AXIS: 45 DEGREES
EKG P-R INTERVAL: 134 MS
EKG Q-T INTERVAL: 336 MS
EKG QRS DURATION: 64 MS
EKG QTC CALCULATION (BAZETT): 460 MS
EKG R AXIS: 0 DEGREES
EKG T AXIS: 29 DEGREES
EKG VENTRICULAR RATE: 113 BPM
EOSINOPHIL # BLD: 0.1 K/UL (ref 0–0.4)
EOSINOPHIL NFR BLD: 1 % (ref 0–7)
ERYTHROCYTE [DISTWIDTH] IN BLOOD BY AUTOMATED COUNT: 20.2 % (ref 11.5–14.5)
GLOBULIN SER CALC-MCNC: 3.8 G/DL (ref 2–4)
GLUCOSE SERPL-MCNC: 135 MG/DL (ref 65–100)
HCT VFR BLD AUTO: 33.5 % (ref 35–47)
HGB BLD-MCNC: 10.1 G/DL (ref 11.5–16)
IMM GRANULOCYTES # BLD AUTO: 0.2 K/UL (ref 0–0.04)
IMM GRANULOCYTES NFR BLD AUTO: 2 % (ref 0–0.5)
LIPASE SERPL-CCNC: 66 U/L (ref 13–75)
LYMPHOCYTES # BLD: 3.1 K/UL (ref 0.8–3.5)
LYMPHOCYTES NFR BLD: 34 % (ref 12–49)
Lab: NORMAL
MCH RBC QN AUTO: 26.4 PG (ref 26–34)
MCHC RBC AUTO-ENTMCNC: 30.1 G/DL (ref 30–36.5)
MCV RBC AUTO: 87.7 FL (ref 80–99)
METHADONE UR QL: NEGATIVE
MONOCYTES # BLD: 0.8 K/UL (ref 0–1)
MONOCYTES NFR BLD: 9 % (ref 5–13)
NEUTS SEG # BLD: 4.8 K/UL (ref 1.8–8)
NEUTS SEG NFR BLD: 53 % (ref 32–75)
NRBC # BLD: 0 K/UL (ref 0–0.01)
NRBC BLD-RTO: 0 PER 100 WBC
NT PRO BNP: 29 PG/ML
OPIATES UR QL: NEGATIVE
PCP UR QL: NEGATIVE
PLATELET # BLD AUTO: 275 K/UL (ref 150–400)
PMV BLD AUTO: 11.8 FL (ref 8.9–12.9)
POTASSIUM SERPL-SCNC: 3.6 MMOL/L (ref 3.5–5.1)
PROT SERPL-MCNC: 7.7 G/DL (ref 6.4–8.2)
RBC # BLD AUTO: 3.82 M/UL (ref 3.8–5.2)
RBC MORPH BLD: ABNORMAL
SODIUM SERPL-SCNC: 138 MMOL/L (ref 136–145)
TROPONIN I SERPL HS-MCNC: 6 NG/L (ref 0–51)
WBC # BLD AUTO: 9.1 K/UL (ref 3.6–11)

## 2024-06-05 PROCEDURE — 84484 ASSAY OF TROPONIN QUANT: CPT

## 2024-06-05 PROCEDURE — 36415 COLL VENOUS BLD VENIPUNCTURE: CPT

## 2024-06-05 PROCEDURE — 2580000003 HC RX 258: Performed by: EMERGENCY MEDICINE

## 2024-06-05 PROCEDURE — 83880 ASSAY OF NATRIURETIC PEPTIDE: CPT

## 2024-06-05 PROCEDURE — 96361 HYDRATE IV INFUSION ADD-ON: CPT

## 2024-06-05 PROCEDURE — 93005 ELECTROCARDIOGRAM TRACING: CPT | Performed by: EMERGENCY MEDICINE

## 2024-06-05 PROCEDURE — 96360 HYDRATION IV INFUSION INIT: CPT

## 2024-06-05 PROCEDURE — 80053 COMPREHEN METABOLIC PANEL: CPT

## 2024-06-05 PROCEDURE — 99285 EMERGENCY DEPT VISIT HI MDM: CPT

## 2024-06-05 PROCEDURE — 71045 X-RAY EXAM CHEST 1 VIEW: CPT

## 2024-06-05 PROCEDURE — 80307 DRUG TEST PRSMV CHEM ANLYZR: CPT

## 2024-06-05 PROCEDURE — 85025 COMPLETE CBC W/AUTO DIFF WBC: CPT

## 2024-06-05 PROCEDURE — 83690 ASSAY OF LIPASE: CPT

## 2024-06-05 RX ORDER — 0.9 % SODIUM CHLORIDE 0.9 %
1000 INTRAVENOUS SOLUTION INTRAVENOUS ONCE
Status: COMPLETED | OUTPATIENT
Start: 2024-06-05 | End: 2024-06-05

## 2024-06-05 RX ORDER — PREDNISONE 10 MG/1
30 TABLET ORAL DAILY
Qty: 9 TABLET | Refills: 0 | Status: SHIPPED | OUTPATIENT
Start: 2024-06-05 | End: 2024-06-08

## 2024-06-05 RX ADMIN — SODIUM CHLORIDE 1000 ML: 9 INJECTION, SOLUTION INTRAVENOUS at 04:54

## 2024-06-05 ASSESSMENT — ENCOUNTER SYMPTOMS
CHEST TIGHTNESS: 1
DIARRHEA: 0
ABDOMINAL PAIN: 0
VOMITING: 0
NAUSEA: 0
SHORTNESS OF BREATH: 1

## 2024-06-05 ASSESSMENT — LIFESTYLE VARIABLES
HOW MANY STANDARD DRINKS CONTAINING ALCOHOL DO YOU HAVE ON A TYPICAL DAY: PATIENT DOES NOT DRINK
HOW OFTEN DO YOU HAVE A DRINK CONTAINING ALCOHOL: NEVER

## 2024-06-05 NOTE — DISCHARGE INSTRUCTIONS
It was a pleasure taking care of you in our Emergency Department today.  We know that when you come to [facility], you are entrusting us with your health, comfort, and safety.  Our physicians and nurses honor that trust, and truly appreciate the opportunity to care for you and your loved ones.      We also value your feedback.  If you receive a survey about your Emergency Department experience today, please fill it out.  We care about our patients' feedback, and we listen to what you have to say.  Thank you!      Dr. Kelley Wheat MD.

## 2024-06-05 NOTE — ED NOTES
Report given to CHANDANA Tipton. Nurse was informed of reason for arrival, vitals, labs, medications, orders, procedures, results, anything left pending and current plan of action. Questions were asked and received prior to departure from the patient.

## 2024-06-05 NOTE — ED NOTES
Spoke with Amaya who is patient's caregiver.  Address provided and confirmed patient has O2 at home.  Will arrange hospital to home for patient.

## 2024-06-05 NOTE — ED NOTES
Patient discharged from the ED by MD Mary Alice. Diagnosis, medications, precautions and follow-ups were reviewed with the patient/family. Questions were asked and answered prior to departure. Patient departed the ED via stretcher and was accompanied by outside hospital ambulance .

## 2024-06-05 NOTE — ED PROVIDER NOTES
EMERGENCY DEPARTMENT HISTORY AND PHYSICAL EXAM     ----------------------------------------------------------------------------  Please note that this dictation was completed with [email or web address], the [employer] voice recognition software.  Quite often unanticipated grammatical, syntax, homophones, and other interpretive errors are inadvertently transcribed by the computer software.  Please disregard these errors.  Please excuse any errors that have escaped final proofreading  ----------------------------------------------------------------------------      Date: 6/5/2024  Patient Name: Li Casanova      HISTORY OF PRESENT ILLNESS     Chief Complaint   Patient presents with    Shortness of Breath     Arrived from home with EMS with cc of SOB that woke her from her sleep. Hx of COPD and asthma.       History obtainted from:  Patient    Other independent source of history: EMS    HPI: Li Casanova is a 63 y.o. female, with significant pmhx of COPD, with chronic oxygen dependence, drug abuse, CVA who presents via S to the ED with c/o shortness of breath.  Reports that shortness of breath) and attempted to rescue inhaler but feels as though it was not working properly.  Was given breathing treatment en route by EMS  Markedly improved.  Denies chest pain, nausea, vomiting or diarrhea.  Denies recent steroid use      PCP: Star Napier MD    Allergy List:   No Known Allergies      CURRENT MEDICATIONS      Previous Medications    ALBUTEROL (PROVENTIL) (5 MG/ML) 0.5% NEBULIZER SOLUTION    Take 0.5 mLs by nebulization every 6 hours as needed for Wheezing    ALBUTEROL SULFATE HFA (PROVENTIL HFA) 108 (90 BASE) MCG/ACT INHALER    Inhale 2 puffs into the lungs every 6 hours as needed for Wheezing    ALBUTEROL SULFATE HFA (PROVENTIL;VENTOLIN;PROAIR) 108 (90 BASE) MCG/ACT INHALER    Inhale 2 puffs into the lungs every 4 hours as needed for Wheezing    AMLODIPINE (NORVASC) 2.5 MG TABLET    Take 1 tablet by mouth daily    ASPIRIN 81 PO

## 2024-06-08 ENCOUNTER — APPOINTMENT (OUTPATIENT)
Facility: HOSPITAL | Age: 64
End: 2024-06-08
Payer: MEDICAID

## 2024-06-08 ENCOUNTER — HOSPITAL ENCOUNTER (EMERGENCY)
Facility: HOSPITAL | Age: 64
Discharge: HOME | End: 2024-06-09
Attending: EMERGENCY MEDICINE
Payer: MEDICAID

## 2024-06-08 DIAGNOSIS — J44.1 COPD EXACERBATION (HCC): Primary | ICD-10-CM

## 2024-06-08 PROCEDURE — 71045 X-RAY EXAM CHEST 1 VIEW: CPT

## 2024-06-08 PROCEDURE — 99284 EMERGENCY DEPT VISIT MOD MDM: CPT

## 2024-06-08 PROCEDURE — 2700000000 HC OXYGEN THERAPY PER DAY

## 2024-06-08 PROCEDURE — 93005 ELECTROCARDIOGRAM TRACING: CPT | Performed by: EMERGENCY MEDICINE

## 2024-06-08 ASSESSMENT — LIFESTYLE VARIABLES
HOW OFTEN DO YOU HAVE A DRINK CONTAINING ALCOHOL: NEVER
HOW MANY STANDARD DRINKS CONTAINING ALCOHOL DO YOU HAVE ON A TYPICAL DAY: PATIENT DOES NOT DRINK

## 2024-06-08 ASSESSMENT — PAIN SCALES - GENERAL: PAINLEVEL_OUTOF10: 0

## 2024-06-08 ASSESSMENT — PAIN - FUNCTIONAL ASSESSMENT: PAIN_FUNCTIONAL_ASSESSMENT: 0-10

## 2024-06-09 VITALS
DIASTOLIC BLOOD PRESSURE: 64 MMHG | TEMPERATURE: 98.7 F | SYSTOLIC BLOOD PRESSURE: 138 MMHG | RESPIRATION RATE: 18 BRPM | WEIGHT: 130.07 LBS | HEART RATE: 89 BPM | HEIGHT: 59 IN | BODY MASS INDEX: 26.22 KG/M2 | OXYGEN SATURATION: 97 %

## 2024-06-09 PROCEDURE — 2700000000 HC OXYGEN THERAPY PER DAY

## 2024-06-09 NOTE — ED PROVIDER NOTES
ED.  Patient encouraged follow-up with pulmonology and given strict return precautions.    ED Course as of 06/09/24 0404   Sat Jun 08, 2024 2050 Personally reviewed discharge summary records from 4/25/2024 when patient was admitted for acute COPD exacerbation discharged on oxygen 2 L nasal cannula.  Patient has had 3 ED visits since then on 5/17, 5/25, 6/5. [AK]   2051 EKG per my interpretation normal sinus rhythm, rate 91 bpm, normal axis, no acute ischemic changes, no interval changes. [AK]      ED Course User Index  [AK] Devyn Joseph MD         Cardiac Monitoring:  The cardiac monitor revealed the following rhythm as interpreted by me: Normal Sinus Rhythm, rate 95 bpm  The cardiac monitor was ordered secondary to the patient's reported complaint of shortness of breath, cough and to monitor the patient for dysrhythmia.  Devyn Joseph MD      FINAL IMPRESSION     1. COPD exacerbation (HCC)          DISPOSITION/PLAN     Discharge Note:  The patient has been re-evaluated and is ready for discharge. Reviewed available results with patient. Counseled patient on diagnosis and care plan. Patient has expressed understanding, and all questions have been answered. Patient agrees with plan and agrees to follow up as recommended, or to return to the ED if their symptoms worsen. Discharge instructions have been provided and explained to the patient, along with reasons to return to the ED.        CLINICAL IMPRESSION    1. COPD exacerbation (HCC)         DISPOSITION  Discharge     PATIENT REFERRED TO:  Star Napier MD  [address] [place] [address] [phone]    Schedule an appointment as soon as possible for a visit       Sade Wheeler MD  [address] [facility] [address] [phone]    Schedule an appointment as soon as possible for a visit       [facility] EMERGENCY DEPT  [address] [facility] [address] [phone]  Go to   As needed, If symptoms worsen        DISCHARGE MEDICATIONS:

## 2024-06-09 NOTE — DISCHARGE INSTRUCTIONS
You were evaluated in the emergency department for cough and shortness of breath.  Your examination was reassuring as was your work-up including chest x-ray and EKG.  It will be important for you to follow-up with your primary care physician in 2-3 days.  If you develop worsening symptoms such as worsening shortness of breath or chest pain, please return to the emergency department immediately.

## 2024-06-11 LAB
EKG ATRIAL RATE: 91 BPM
EKG DIAGNOSIS: NORMAL
EKG P AXIS: 48 DEGREES
EKG P-R INTERVAL: 146 MS
EKG Q-T INTERVAL: 364 MS
EKG QRS DURATION: 72 MS
EKG QTC CALCULATION (BAZETT): 447 MS
EKG R AXIS: 6 DEGREES
EKG T AXIS: 29 DEGREES
EKG VENTRICULAR RATE: 91 BPM

## 2024-06-16 ENCOUNTER — APPOINTMENT (OUTPATIENT)
Facility: HOSPITAL | Age: 64
End: 2024-06-16
Payer: MEDICAID

## 2024-06-16 ENCOUNTER — HOSPITAL ENCOUNTER (INPATIENT)
Facility: HOSPITAL | Age: 64
LOS: 13 days | Discharge: NURSING, REHAB OR LONG-TERM CARE | End: 2024-06-29
Attending: STUDENT IN AN ORGANIZED HEALTH CARE EDUCATION/TRAINING PROGRAM | Admitting: STUDENT IN AN ORGANIZED HEALTH CARE EDUCATION/TRAINING PROGRAM
Payer: MEDICAID

## 2024-06-16 DIAGNOSIS — F14.90 COCAINE USE: ICD-10-CM

## 2024-06-16 DIAGNOSIS — R06.02 SHORTNESS OF BREATH: ICD-10-CM

## 2024-06-16 DIAGNOSIS — J44.1 COPD EXACERBATION (HCC): Primary | ICD-10-CM

## 2024-06-16 DIAGNOSIS — R06.03 ACUTE RESPIRATORY DISTRESS: ICD-10-CM

## 2024-06-16 LAB
ALBUMIN SERPL-MCNC: 3.9 G/DL (ref 3.5–5)
ALBUMIN SERPL-MCNC: 4.1 G/DL (ref 3.5–5)
ALBUMIN/GLOB SERPL: 1.1 (ref 1.1–2.2)
ALBUMIN/GLOB SERPL: 1.1 (ref 1.1–2.2)
ALP SERPL-CCNC: 56 U/L (ref 45–117)
ALP SERPL-CCNC: 65 U/L (ref 45–117)
ALT SERPL-CCNC: 26 U/L (ref 12–78)
ALT SERPL-CCNC: 27 U/L (ref 12–78)
AMPHET UR QL SCN: NEGATIVE
ANION GAP SERPL CALC-SCNC: 3 MMOL/L (ref 5–15)
ANION GAP SERPL CALC-SCNC: 6 MMOL/L (ref 5–15)
APPEARANCE UR: CLEAR
ARTERIAL PATENCY WRIST A: YES
ARTERIAL PATENCY WRIST A: YES
AST SERPL-CCNC: 22 U/L (ref 15–37)
AST SERPL-CCNC: 28 U/L (ref 15–37)
BACTERIA URNS QL MICRO: NEGATIVE /HPF
BARBITURATES UR QL SCN: NEGATIVE
BASE DEFICIT BLD-SCNC: 2.8 MMOL/L
BASE DEFICIT BLDA-SCNC: 4 MMOL/L
BASE DEFICIT BLDA-SCNC: 4.8 MMOL/L
BASOPHILS # BLD: 0.1 K/UL (ref 0–0.1)
BASOPHILS NFR BLD: 1 % (ref 0–1)
BDY SITE: ABNORMAL
BDY SITE: ABNORMAL
BENZODIAZ UR QL: NEGATIVE
BILIRUB DIRECT SERPL-MCNC: 0.1 MG/DL (ref 0–0.2)
BILIRUB SERPL-MCNC: 0.1 MG/DL (ref 0.2–1)
BILIRUB SERPL-MCNC: 0.2 MG/DL (ref 0.2–1)
BILIRUB UR QL: NEGATIVE
BUN SERPL-MCNC: 17 MG/DL (ref 6–20)
BUN SERPL-MCNC: 18 MG/DL (ref 6–20)
BUN/CREAT SERPL: 18 (ref 12–20)
BUN/CREAT SERPL: 19 (ref 12–20)
CALCIUM SERPL-MCNC: 9.4 MG/DL (ref 8.5–10.1)
CALCIUM SERPL-MCNC: 9.6 MG/DL (ref 8.5–10.1)
CANNABINOIDS UR QL SCN: NEGATIVE
CHLORIDE SERPL-SCNC: 106 MMOL/L (ref 97–108)
CHLORIDE SERPL-SCNC: 113 MMOL/L (ref 97–108)
CK SERPL-CCNC: 651 U/L (ref 26–192)
CO2 SERPL-SCNC: 25 MMOL/L (ref 21–32)
CO2 SERPL-SCNC: 26 MMOL/L (ref 21–32)
COCAINE UR QL SCN: POSITIVE
COLOR UR: ABNORMAL
CREAT SERPL-MCNC: 0.93 MG/DL (ref 0.55–1.02)
CREAT SERPL-MCNC: 0.96 MG/DL (ref 0.55–1.02)
DIFFERENTIAL METHOD BLD: ABNORMAL
EOSINOPHIL # BLD: 0.1 K/UL (ref 0–0.4)
EOSINOPHIL NFR BLD: 2 % (ref 0–7)
EPITH CASTS URNS QL MICRO: ABNORMAL /LPF
ERYTHROCYTE [DISTWIDTH] IN BLOOD BY AUTOMATED COUNT: 21.2 % (ref 11.5–14.5)
ERYTHROCYTE [DISTWIDTH] IN BLOOD BY AUTOMATED COUNT: 21.6 % (ref 11.5–14.5)
ETHANOL SERPL-MCNC: <10 MG/DL (ref 0–0.08)
FIO2 ON VENT: 100 %
FLUAV RNA SPEC QL NAA+PROBE: NOT DETECTED
FLUBV RNA SPEC QL NAA+PROBE: NOT DETECTED
GAS FLOW.O2 O2 DELIVERY SYS: 15 L/MIN
GAS FLOW.O2 SETTING OXYMISER: 18 BPM
GLOBULIN SER CALC-MCNC: 3.7 G/DL (ref 2–4)
GLOBULIN SER CALC-MCNC: 3.7 G/DL (ref 2–4)
GLUCOSE BLD STRIP.AUTO-MCNC: 204 MG/DL (ref 65–117)
GLUCOSE BLD STRIP.AUTO-MCNC: 213 MG/DL (ref 65–117)
GLUCOSE SERPL-MCNC: 184 MG/DL (ref 65–100)
GLUCOSE SERPL-MCNC: 197 MG/DL (ref 65–100)
GLUCOSE UR STRIP.AUTO-MCNC: 100 MG/DL
HCO3 BLD-SCNC: 25.2 MMOL/L (ref 22–26)
HCO3 BLDA-SCNC: 22 MMOL/L (ref 22–26)
HCO3 BLDA-SCNC: 24 MMOL/L (ref 22–26)
HCT VFR BLD AUTO: 27.7 % (ref 35–47)
HCT VFR BLD AUTO: 32.5 % (ref 35–47)
HGB BLD-MCNC: 8.5 G/DL (ref 11.5–16)
HGB BLD-MCNC: 9.9 G/DL (ref 11.5–16)
HGB UR QL STRIP: NEGATIVE
IMM GRANULOCYTES # BLD AUTO: 0.1 K/UL (ref 0–0.04)
IMM GRANULOCYTES NFR BLD AUTO: 1 % (ref 0–0.5)
INR PPP: 1.1 (ref 0.9–1.1)
KETONES UR QL STRIP.AUTO: NEGATIVE MG/DL
LACTATE BLD-SCNC: 3.51 MMOL/L (ref 0.4–2)
LACTATE BLD-SCNC: 4.41 MMOL/L (ref 0.4–2)
LACTATE BLD-SCNC: 4.78 MMOL/L (ref 0.4–2)
LEUKOCYTE ESTERASE UR QL STRIP.AUTO: NEGATIVE
LYMPHOCYTES # BLD: 2.8 K/UL (ref 0.8–3.5)
LYMPHOCYTES NFR BLD: 39 % (ref 12–49)
Lab: ABNORMAL
MAGNESIUM SERPL-MCNC: 2.2 MG/DL (ref 1.6–2.4)
MAGNESIUM SERPL-MCNC: 2.3 MG/DL (ref 1.6–2.4)
MCH RBC QN AUTO: 27 PG (ref 26–34)
MCH RBC QN AUTO: 27.3 PG (ref 26–34)
MCHC RBC AUTO-ENTMCNC: 30.5 G/DL (ref 30–36.5)
MCHC RBC AUTO-ENTMCNC: 30.7 G/DL (ref 30–36.5)
MCV RBC AUTO: 88.8 FL (ref 80–99)
MCV RBC AUTO: 89.1 FL (ref 80–99)
METHADONE UR QL: NEGATIVE
MONOCYTES # BLD: 0.7 K/UL (ref 0–1)
MONOCYTES NFR BLD: 10 % (ref 5–13)
NEUTS SEG # BLD: 3.3 K/UL (ref 1.8–8)
NEUTS SEG NFR BLD: 47 % (ref 32–75)
NITRITE UR QL STRIP.AUTO: NEGATIVE
NRBC # BLD: 0 K/UL (ref 0–0.01)
NRBC # BLD: 0 K/UL (ref 0–0.01)
NRBC BLD-RTO: 0 PER 100 WBC
NRBC BLD-RTO: 0 PER 100 WBC
NT PRO BNP: 992 PG/ML
O2/TOTAL GAS SETTING VFR VENT: 100 %
OPIATES UR QL: NEGATIVE
PCO2 BLD: 60.2 MMHG (ref 35–45)
PCO2 BLDA: 44 MMHG (ref 35–45)
PCO2 BLDA: 53 MMHG (ref 35–45)
PCP UR QL: NEGATIVE
PEEP RESPIRATORY: 5 CMH2O
PH BLD: 7.23 (ref 7.35–7.45)
PH BLDA: 7.26 (ref 7.35–7.45)
PH BLDA: 7.31 (ref 7.35–7.45)
PH UR STRIP: 5.5 (ref 5–8)
PHOSPHATE SERPL-MCNC: 4.8 MG/DL (ref 2.6–4.7)
PLATELET # BLD AUTO: 213 K/UL (ref 150–400)
PLATELET # BLD AUTO: 229 K/UL (ref 150–400)
PMV BLD AUTO: 11.8 FL (ref 8.9–12.9)
PMV BLD AUTO: 11.9 FL (ref 8.9–12.9)
PO2 BLD: 246 MMHG (ref 80–100)
PO2 BLDA: 130 MMHG (ref 80–100)
PO2 BLDA: 54 MMHG (ref 80–100)
POTASSIUM SERPL-SCNC: 3.5 MMOL/L (ref 3.5–5.1)
POTASSIUM SERPL-SCNC: 4.9 MMOL/L (ref 3.5–5.1)
PROT SERPL-MCNC: 7.6 G/DL (ref 6.4–8.2)
PROT SERPL-MCNC: 7.8 G/DL (ref 6.4–8.2)
PROT UR STRIP-MCNC: NEGATIVE MG/DL
PROTHROMBIN TIME: 11.4 SEC (ref 9–11.1)
RBC # BLD AUTO: 3.11 M/UL (ref 3.8–5.2)
RBC # BLD AUTO: 3.66 M/UL (ref 3.8–5.2)
RBC #/AREA URNS HPF: ABNORMAL /HPF (ref 0–5)
RBC MORPH BLD: ABNORMAL
SAO2 % BLD: 85 % (ref 92–97)
SAO2 % BLD: 98 % (ref 92–97)
SAO2 % BLD: 99.7 % (ref 92–97)
SAO2% DEVICE SAO2% SENSOR NAME: ABNORMAL
SAO2% DEVICE SAO2% SENSOR NAME: ABNORMAL
SARS-COV-2 RNA RESP QL NAA+PROBE: NOT DETECTED
SERVICE CMNT-IMP: ABNORMAL
SODIUM SERPL-SCNC: 138 MMOL/L (ref 136–145)
SODIUM SERPL-SCNC: 141 MMOL/L (ref 136–145)
SPECIMEN SITE: ABNORMAL
SPECIMEN SITE: ABNORMAL
SPECIMEN TYPE: ABNORMAL
TROPONIN I SERPL HS-MCNC: 11 NG/L (ref 0–51)
TROPONIN I SERPL HS-MCNC: 38 NG/L (ref 0–51)
URINE CULTURE IF INDICATED: ABNORMAL
UROBILINOGEN UR QL STRIP.AUTO: 0.2 EU/DL (ref 0.2–1)
VENTILATION MODE VENT: ABNORMAL
VT SETTING VENT: 340 ML
WBC # BLD AUTO: 15.1 K/UL (ref 3.6–11)
WBC # BLD AUTO: 7.1 K/UL (ref 3.6–11)
WBC URNS QL MICRO: ABNORMAL /HPF (ref 0–4)

## 2024-06-16 PROCEDURE — 82962 GLUCOSE BLOOD TEST: CPT

## 2024-06-16 PROCEDURE — 6370000000 HC RX 637 (ALT 250 FOR IP): Performed by: NURSE PRACTITIONER

## 2024-06-16 PROCEDURE — 2500000003 HC RX 250 WO HCPCS: Performed by: NURSE PRACTITIONER

## 2024-06-16 PROCEDURE — 85025 COMPLETE CBC W/AUTO DIFF WBC: CPT

## 2024-06-16 PROCEDURE — 71045 X-RAY EXAM CHEST 1 VIEW: CPT

## 2024-06-16 PROCEDURE — 96375 TX/PRO/DX INJ NEW DRUG ADDON: CPT

## 2024-06-16 PROCEDURE — 81001 URINALYSIS AUTO W/SCOPE: CPT

## 2024-06-16 PROCEDURE — 85610 PROTHROMBIN TIME: CPT

## 2024-06-16 PROCEDURE — 31500 INSERT EMERGENCY AIRWAY: CPT

## 2024-06-16 PROCEDURE — 83735 ASSAY OF MAGNESIUM: CPT

## 2024-06-16 PROCEDURE — 2500000003 HC RX 250 WO HCPCS: Performed by: INTERNAL MEDICINE

## 2024-06-16 PROCEDURE — 80053 COMPREHEN METABOLIC PANEL: CPT

## 2024-06-16 PROCEDURE — 2580000003 HC RX 258: Performed by: STUDENT IN AN ORGANIZED HEALTH CARE EDUCATION/TRAINING PROGRAM

## 2024-06-16 PROCEDURE — 2700000000 HC OXYGEN THERAPY PER DAY

## 2024-06-16 PROCEDURE — 51798 US URINE CAPACITY MEASURE: CPT

## 2024-06-16 PROCEDURE — 2000000000 HC ICU R&B

## 2024-06-16 PROCEDURE — 83605 ASSAY OF LACTIC ACID: CPT

## 2024-06-16 PROCEDURE — 6360000002 HC RX W HCPCS: Performed by: STUDENT IN AN ORGANIZED HEALTH CARE EDUCATION/TRAINING PROGRAM

## 2024-06-16 PROCEDURE — 82077 ASSAY SPEC XCP UR&BREATH IA: CPT

## 2024-06-16 PROCEDURE — 6360000002 HC RX W HCPCS: Performed by: NURSE PRACTITIONER

## 2024-06-16 PROCEDURE — 94640 AIRWAY INHALATION TREATMENT: CPT

## 2024-06-16 PROCEDURE — 84100 ASSAY OF PHOSPHORUS: CPT

## 2024-06-16 PROCEDURE — 84484 ASSAY OF TROPONIN QUANT: CPT

## 2024-06-16 PROCEDURE — 0BH17EZ INSERTION OF ENDOTRACHEAL AIRWAY INTO TRACHEA, VIA NATURAL OR ARTIFICIAL OPENING: ICD-10-PCS | Performed by: NURSE PRACTITIONER

## 2024-06-16 PROCEDURE — 85027 COMPLETE CBC AUTOMATED: CPT

## 2024-06-16 PROCEDURE — 36415 COLL VENOUS BLD VENIPUNCTURE: CPT

## 2024-06-16 PROCEDURE — 2580000003 HC RX 258: Performed by: INTERNAL MEDICINE

## 2024-06-16 PROCEDURE — 51701 INSERT BLADDER CATHETER: CPT

## 2024-06-16 PROCEDURE — 83880 ASSAY OF NATRIURETIC PEPTIDE: CPT

## 2024-06-16 PROCEDURE — 2580000003 HC RX 258: Performed by: NURSE PRACTITIONER

## 2024-06-16 PROCEDURE — 6360000002 HC RX W HCPCS: Performed by: INTERNAL MEDICINE

## 2024-06-16 PROCEDURE — 5A1955Z RESPIRATORY VENTILATION, GREATER THAN 96 CONSECUTIVE HOURS: ICD-10-PCS | Performed by: INTERNAL MEDICINE

## 2024-06-16 PROCEDURE — 96365 THER/PROPH/DIAG IV INF INIT: CPT

## 2024-06-16 PROCEDURE — 6370000000 HC RX 637 (ALT 250 FOR IP): Performed by: STUDENT IN AN ORGANIZED HEALTH CARE EDUCATION/TRAINING PROGRAM

## 2024-06-16 PROCEDURE — 94002 VENT MGMT INPAT INIT DAY: CPT

## 2024-06-16 PROCEDURE — 87636 SARSCOV2 & INF A&B AMP PRB: CPT

## 2024-06-16 PROCEDURE — 99285 EMERGENCY DEPT VISIT HI MDM: CPT

## 2024-06-16 PROCEDURE — 6360000004 HC RX CONTRAST MEDICATION: Performed by: STUDENT IN AN ORGANIZED HEALTH CARE EDUCATION/TRAINING PROGRAM

## 2024-06-16 PROCEDURE — 93005 ELECTROCARDIOGRAM TRACING: CPT | Performed by: STUDENT IN AN ORGANIZED HEALTH CARE EDUCATION/TRAINING PROGRAM

## 2024-06-16 PROCEDURE — 82803 BLOOD GASES ANY COMBINATION: CPT

## 2024-06-16 PROCEDURE — 71275 CT ANGIOGRAPHY CHEST: CPT

## 2024-06-16 PROCEDURE — 80307 DRUG TEST PRSMV CHEM ANLYZR: CPT

## 2024-06-16 PROCEDURE — 36600 WITHDRAWAL OF ARTERIAL BLOOD: CPT

## 2024-06-16 PROCEDURE — 80076 HEPATIC FUNCTION PANEL: CPT

## 2024-06-16 PROCEDURE — 82550 ASSAY OF CK (CPK): CPT

## 2024-06-16 RX ORDER — DEXMEDETOMIDINE HYDROCHLORIDE 4 UG/ML
.1-1.5 INJECTION, SOLUTION INTRAVENOUS CONTINUOUS
Status: DISCONTINUED | OUTPATIENT
Start: 2024-06-16 | End: 2024-06-23

## 2024-06-16 RX ORDER — ATORVASTATIN CALCIUM 20 MG/1
20 TABLET, FILM COATED ORAL DAILY
Status: DISCONTINUED | OUTPATIENT
Start: 2024-06-17 | End: 2024-06-29 | Stop reason: HOSPADM

## 2024-06-16 RX ORDER — PHENOBARBITAL SODIUM 65 MG/ML
16.2 INJECTION, SOLUTION INTRAMUSCULAR; INTRAVENOUS EVERY 6 HOURS PRN
Status: DISCONTINUED | OUTPATIENT
Start: 2024-06-19 | End: 2024-06-16

## 2024-06-16 RX ORDER — ASPIRIN 81 MG/1
81 TABLET, CHEWABLE ORAL DAILY
Status: DISCONTINUED | OUTPATIENT
Start: 2024-06-16 | End: 2024-06-16

## 2024-06-16 RX ORDER — GLUCAGON 1 MG/ML
1 KIT INJECTION PRN
Status: DISCONTINUED | OUTPATIENT
Start: 2024-06-16 | End: 2024-06-29 | Stop reason: HOSPADM

## 2024-06-16 RX ORDER — FOLIC ACID 1 MG/1
1 TABLET ORAL DAILY
Status: DISCONTINUED | OUTPATIENT
Start: 2024-06-17 | End: 2024-06-24 | Stop reason: SDUPTHER

## 2024-06-16 RX ORDER — LORAZEPAM 1 MG/1
1 TABLET ORAL
Status: DISCONTINUED | OUTPATIENT
Start: 2024-06-16 | End: 2024-06-16

## 2024-06-16 RX ORDER — PROPOFOL 10 MG/ML
INJECTION, EMULSION INTRAVENOUS
Status: DISCONTINUED
Start: 2024-06-16 | End: 2024-06-16

## 2024-06-16 RX ORDER — SODIUM CHLORIDE 9 MG/ML
INJECTION, SOLUTION INTRAVENOUS CONTINUOUS
Status: ACTIVE | OUTPATIENT
Start: 2024-06-16 | End: 2024-06-17

## 2024-06-16 RX ORDER — HALOPERIDOL 5 MG/ML
5 INJECTION INTRAMUSCULAR ONCE
Status: COMPLETED | OUTPATIENT
Start: 2024-06-16 | End: 2024-06-16

## 2024-06-16 RX ORDER — SODIUM CHLORIDE 0.9 % (FLUSH) 0.9 %
5-40 SYRINGE (ML) INJECTION EVERY 12 HOURS SCHEDULED
Status: DISCONTINUED | OUTPATIENT
Start: 2024-06-16 | End: 2024-06-24

## 2024-06-16 RX ORDER — INSULIN LISPRO 100 [IU]/ML
0-8 INJECTION, SOLUTION INTRAVENOUS; SUBCUTANEOUS EVERY 6 HOURS
Status: DISCONTINUED | OUTPATIENT
Start: 2024-06-16 | End: 2024-06-24

## 2024-06-16 RX ORDER — POLYETHYLENE GLYCOL 3350 17 G/17G
17 POWDER, FOR SOLUTION ORAL DAILY PRN
Status: DISCONTINUED | OUTPATIENT
Start: 2024-06-16 | End: 2024-06-29 | Stop reason: HOSPADM

## 2024-06-16 RX ORDER — SODIUM CHLORIDE 0.9 % (FLUSH) 0.9 %
5-40 SYRINGE (ML) INJECTION PRN
Status: DISCONTINUED | OUTPATIENT
Start: 2024-06-16 | End: 2024-06-24

## 2024-06-16 RX ORDER — IPRATROPIUM BROMIDE AND ALBUTEROL SULFATE 2.5; .5 MG/3ML; MG/3ML
3 SOLUTION RESPIRATORY (INHALATION)
Status: COMPLETED | OUTPATIENT
Start: 2024-06-16 | End: 2024-06-16

## 2024-06-16 RX ORDER — ETOMIDATE 2 MG/ML
20 INJECTION INTRAVENOUS ONCE
Status: COMPLETED | OUTPATIENT
Start: 2024-06-16 | End: 2024-06-16

## 2024-06-16 RX ORDER — PHENOBARBITAL SODIUM 65 MG/ML
32.5 INJECTION, SOLUTION INTRAMUSCULAR; INTRAVENOUS EVERY 6 HOURS PRN
Status: DISCONTINUED | OUTPATIENT
Start: 2024-06-17 | End: 2024-06-16

## 2024-06-16 RX ORDER — ACETAMINOPHEN 325 MG/1
650 TABLET ORAL EVERY 6 HOURS PRN
Status: DISCONTINUED | OUTPATIENT
Start: 2024-06-16 | End: 2024-06-29 | Stop reason: HOSPADM

## 2024-06-16 RX ORDER — AMLODIPINE BESYLATE 2.5 MG/1
2.5 TABLET ORAL DAILY
Status: DISCONTINUED | OUTPATIENT
Start: 2024-06-16 | End: 2024-06-18

## 2024-06-16 RX ORDER — ENOXAPARIN SODIUM 100 MG/ML
40 INJECTION SUBCUTANEOUS DAILY
Status: DISCONTINUED | OUTPATIENT
Start: 2024-06-17 | End: 2024-06-27

## 2024-06-16 RX ORDER — ACETAMINOPHEN 650 MG/1
650 SUPPOSITORY RECTAL EVERY 6 HOURS PRN
Status: DISCONTINUED | OUTPATIENT
Start: 2024-06-16 | End: 2024-06-29 | Stop reason: HOSPADM

## 2024-06-16 RX ORDER — ATORVASTATIN CALCIUM 20 MG/1
20 TABLET, FILM COATED ORAL DAILY
Status: DISCONTINUED | OUTPATIENT
Start: 2024-06-16 | End: 2024-06-16

## 2024-06-16 RX ORDER — CLOPIDOGREL BISULFATE 75 MG/1
75 TABLET ORAL DAILY
Status: DISCONTINUED | OUTPATIENT
Start: 2024-06-16 | End: 2024-06-16

## 2024-06-16 RX ORDER — PHENOBARBITAL 32.4 MG/1
16.2 TABLET ORAL 2 TIMES DAILY
Status: DISCONTINUED | OUTPATIENT
Start: 2024-06-18 | End: 2024-06-16

## 2024-06-16 RX ORDER — LORAZEPAM 2 MG/ML
2 INJECTION INTRAMUSCULAR
Status: DISCONTINUED | OUTPATIENT
Start: 2024-06-16 | End: 2024-06-16

## 2024-06-16 RX ORDER — DILTIAZEM HYDROCHLORIDE 5 MG/ML
10 INJECTION INTRAVENOUS ONCE
Status: COMPLETED | OUTPATIENT
Start: 2024-06-16 | End: 2024-06-16

## 2024-06-16 RX ORDER — LORAZEPAM 1 MG/1
4 TABLET ORAL
Status: DISCONTINUED | OUTPATIENT
Start: 2024-06-16 | End: 2024-06-16

## 2024-06-16 RX ORDER — PHENOBARBITAL SODIUM 65 MG/ML
32.5 INJECTION, SOLUTION INTRAMUSCULAR; INTRAVENOUS EVERY 6 HOURS
Status: COMPLETED | OUTPATIENT
Start: 2024-06-17 | End: 2024-06-18

## 2024-06-16 RX ORDER — DIAZEPAM 5 MG/ML
5 INJECTION, SOLUTION INTRAMUSCULAR; INTRAVENOUS ONCE
Status: COMPLETED | OUTPATIENT
Start: 2024-06-16 | End: 2024-06-16

## 2024-06-16 RX ORDER — 0.9 % SODIUM CHLORIDE 0.9 %
500 INTRAVENOUS SOLUTION INTRAVENOUS ONCE
Status: COMPLETED | OUTPATIENT
Start: 2024-06-16 | End: 2024-06-17

## 2024-06-16 RX ORDER — LORAZEPAM 2 MG/ML
1 INJECTION INTRAMUSCULAR ONCE
Status: COMPLETED | OUTPATIENT
Start: 2024-06-16 | End: 2024-06-16

## 2024-06-16 RX ORDER — LEVALBUTEROL TARTRATE 45 UG/1
1 AEROSOL, METERED ORAL EVERY 6 HOURS
Status: DISCONTINUED | OUTPATIENT
Start: 2024-06-16 | End: 2024-06-16

## 2024-06-16 RX ORDER — LORAZEPAM 2 MG/ML
3 INJECTION INTRAMUSCULAR
Status: DISCONTINUED | OUTPATIENT
Start: 2024-06-16 | End: 2024-06-16

## 2024-06-16 RX ORDER — 0.9 % SODIUM CHLORIDE 0.9 %
1000 INTRAVENOUS SOLUTION INTRAVENOUS ONCE
Status: COMPLETED | OUTPATIENT
Start: 2024-06-16 | End: 2024-06-16

## 2024-06-16 RX ORDER — PHENOBARBITAL 32.4 MG/1
32.4 TABLET ORAL 2 TIMES DAILY
Status: DISCONTINUED | OUTPATIENT
Start: 2024-06-17 | End: 2024-06-16

## 2024-06-16 RX ORDER — ROCURONIUM BROMIDE 10 MG/ML
50 INJECTION, SOLUTION INTRAVENOUS ONCE
Status: DISCONTINUED | OUTPATIENT
Start: 2024-06-16 | End: 2024-06-17

## 2024-06-16 RX ORDER — SODIUM CHLORIDE 9 MG/ML
INJECTION, SOLUTION INTRAVENOUS PRN
Status: DISCONTINUED | OUTPATIENT
Start: 2024-06-16 | End: 2024-06-24 | Stop reason: SDUPTHER

## 2024-06-16 RX ORDER — ALBUTEROL SULFATE 2.5 MG/3ML
2.5 SOLUTION RESPIRATORY (INHALATION)
Status: DISCONTINUED | OUTPATIENT
Start: 2024-06-16 | End: 2024-06-16

## 2024-06-16 RX ORDER — SODIUM CHLORIDE 0.9 % (FLUSH) 0.9 %
5-40 SYRINGE (ML) INJECTION EVERY 12 HOURS SCHEDULED
Status: DISCONTINUED | OUTPATIENT
Start: 2024-06-16 | End: 2024-06-29 | Stop reason: HOSPADM

## 2024-06-16 RX ORDER — ALBUTEROL SULFATE 2.5 MG/3ML
2.5 SOLUTION RESPIRATORY (INHALATION) EVERY 4 HOURS
Status: DISCONTINUED | OUTPATIENT
Start: 2024-06-16 | End: 2024-06-16

## 2024-06-16 RX ORDER — PHENOBARBITAL SODIUM 65 MG/ML
65 INJECTION, SOLUTION INTRAMUSCULAR; INTRAVENOUS EVERY 6 HOURS PRN
Status: DISCONTINUED | OUTPATIENT
Start: 2024-06-16 | End: 2024-06-16

## 2024-06-16 RX ORDER — LORAZEPAM 1 MG/1
2 TABLET ORAL
Status: DISCONTINUED | OUTPATIENT
Start: 2024-06-16 | End: 2024-06-16

## 2024-06-16 RX ORDER — ARFORMOTEROL TARTRATE 15 UG/2ML
15 SOLUTION RESPIRATORY (INHALATION)
Status: DISCONTINUED | OUTPATIENT
Start: 2024-06-16 | End: 2024-06-29 | Stop reason: HOSPADM

## 2024-06-16 RX ORDER — PHENOBARBITAL SODIUM 65 MG/ML
65 INJECTION, SOLUTION INTRAMUSCULAR; INTRAVENOUS EVERY 6 HOURS
Status: COMPLETED | OUTPATIENT
Start: 2024-06-16 | End: 2024-06-17

## 2024-06-16 RX ORDER — DEXTROSE MONOHYDRATE 100 MG/ML
INJECTION, SOLUTION INTRAVENOUS CONTINUOUS PRN
Status: DISCONTINUED | OUTPATIENT
Start: 2024-06-16 | End: 2024-06-29 | Stop reason: HOSPADM

## 2024-06-16 RX ORDER — PHENOBARBITAL SODIUM 65 MG/ML
32.5 INJECTION, SOLUTION INTRAMUSCULAR; INTRAVENOUS EVERY 12 HOURS
Status: COMPLETED | OUTPATIENT
Start: 2024-06-18 | End: 2024-06-19

## 2024-06-16 RX ORDER — IPRATROPIUM BROMIDE AND ALBUTEROL SULFATE 2.5; .5 MG/3ML; MG/3ML
1 SOLUTION RESPIRATORY (INHALATION)
Status: DISCONTINUED | OUTPATIENT
Start: 2024-06-16 | End: 2024-06-22

## 2024-06-16 RX ORDER — TRAZODONE HYDROCHLORIDE 50 MG/1
50 TABLET ORAL NIGHTLY
Status: DISCONTINUED | OUTPATIENT
Start: 2024-06-16 | End: 2024-06-16

## 2024-06-16 RX ORDER — LEVALBUTEROL INHALATION SOLUTION 1.25 MG/3ML
1.25 SOLUTION RESPIRATORY (INHALATION) EVERY 4 HOURS PRN
Status: DISCONTINUED | OUTPATIENT
Start: 2024-06-16 | End: 2024-06-29 | Stop reason: HOSPADM

## 2024-06-16 RX ORDER — CLOPIDOGREL BISULFATE 75 MG/1
75 TABLET ORAL DAILY
Status: DISCONTINUED | OUTPATIENT
Start: 2024-06-17 | End: 2024-06-29 | Stop reason: HOSPADM

## 2024-06-16 RX ORDER — HYDROCHLOROTHIAZIDE 25 MG/1
25 TABLET ORAL DAILY
Status: DISCONTINUED | OUTPATIENT
Start: 2024-06-16 | End: 2024-06-29 | Stop reason: HOSPADM

## 2024-06-16 RX ORDER — FENTANYL CITRATE 50 UG/ML
25 INJECTION, SOLUTION INTRAMUSCULAR; INTRAVENOUS EVERY 6 HOURS PRN
Status: DISCONTINUED | OUTPATIENT
Start: 2024-06-16 | End: 2024-06-29 | Stop reason: HOSPADM

## 2024-06-16 RX ORDER — BUDESONIDE 0.25 MG/2ML
0.25 INHALANT ORAL
Status: DISCONTINUED | OUTPATIENT
Start: 2024-06-16 | End: 2024-06-29 | Stop reason: HOSPADM

## 2024-06-16 RX ORDER — SODIUM CHLORIDE 0.9 % (FLUSH) 0.9 %
5-40 SYRINGE (ML) INJECTION PRN
Status: DISCONTINUED | OUTPATIENT
Start: 2024-06-16 | End: 2024-06-29 | Stop reason: HOSPADM

## 2024-06-16 RX ORDER — PHENOBARBITAL SODIUM 65 MG/ML
16.2 INJECTION, SOLUTION INTRAMUSCULAR; INTRAVENOUS EVERY 12 HOURS
Status: COMPLETED | OUTPATIENT
Start: 2024-06-19 | End: 2024-06-20

## 2024-06-16 RX ORDER — PROPOFOL 10 MG/ML
5-50 INJECTION, EMULSION INTRAVENOUS CONTINUOUS
Status: DISCONTINUED | OUTPATIENT
Start: 2024-06-16 | End: 2024-06-21

## 2024-06-16 RX ORDER — ASPIRIN 81 MG/1
81 TABLET, CHEWABLE ORAL DAILY
Status: DISCONTINUED | OUTPATIENT
Start: 2024-06-17 | End: 2024-06-29 | Stop reason: HOSPADM

## 2024-06-16 RX ORDER — LORAZEPAM 1 MG/1
3 TABLET ORAL
Status: DISCONTINUED | OUTPATIENT
Start: 2024-06-16 | End: 2024-06-16

## 2024-06-16 RX ORDER — ONDANSETRON 2 MG/ML
4 INJECTION INTRAMUSCULAR; INTRAVENOUS EVERY 6 HOURS PRN
Status: DISCONTINUED | OUTPATIENT
Start: 2024-06-16 | End: 2024-06-29 | Stop reason: HOSPADM

## 2024-06-16 RX ORDER — LORAZEPAM 2 MG/ML
1 INJECTION INTRAMUSCULAR
Status: DISCONTINUED | OUTPATIENT
Start: 2024-06-16 | End: 2024-06-16

## 2024-06-16 RX ORDER — GAUZE BANDAGE 2" X 2"
100 BANDAGE TOPICAL DAILY
Status: DISCONTINUED | OUTPATIENT
Start: 2024-06-16 | End: 2024-06-16

## 2024-06-16 RX ORDER — PHENOBARBITAL 32.4 MG/1
32.4 TABLET ORAL 4 TIMES DAILY
Status: DISCONTINUED | OUTPATIENT
Start: 2024-06-16 | End: 2024-06-16

## 2024-06-16 RX ORDER — ONDANSETRON 4 MG/1
4 TABLET, ORALLY DISINTEGRATING ORAL EVERY 8 HOURS PRN
Status: DISCONTINUED | OUTPATIENT
Start: 2024-06-16 | End: 2024-06-29 | Stop reason: HOSPADM

## 2024-06-16 RX ORDER — LORAZEPAM 2 MG/ML
4 INJECTION INTRAMUSCULAR
Status: DISCONTINUED | OUTPATIENT
Start: 2024-06-16 | End: 2024-06-16

## 2024-06-16 RX ADMIN — DILTIAZEM HYDROCHLORIDE 10 MG: 5 INJECTION, SOLUTION INTRAVENOUS at 19:44

## 2024-06-16 RX ADMIN — SERTRALINE 50 MG: 50 TABLET, FILM COATED ORAL at 11:00

## 2024-06-16 RX ADMIN — PHENOBARBITAL SODIUM 65 MG: 65 INJECTION, SOLUTION INTRAMUSCULAR; INTRAVENOUS at 20:13

## 2024-06-16 RX ADMIN — SODIUM CHLORIDE, PRESERVATIVE FREE 10 ML: 5 INJECTION INTRAVENOUS at 19:59

## 2024-06-16 RX ADMIN — Medication 50 MG: at 22:00

## 2024-06-16 RX ADMIN — PROPOFOL 20 MCG/KG/MIN: 10 INJECTION, EMULSION INTRAVENOUS at 22:09

## 2024-06-16 RX ADMIN — SODIUM CHLORIDE, PRESERVATIVE FREE 10 ML: 5 INJECTION INTRAVENOUS at 12:12

## 2024-06-16 RX ADMIN — DIAZEPAM 5 MG: 10 INJECTION, SOLUTION INTRAMUSCULAR; INTRAVENOUS at 06:03

## 2024-06-16 RX ADMIN — SODIUM CHLORIDE 5 MG/HR: 900 INJECTION, SOLUTION INTRAVENOUS at 19:48

## 2024-06-16 RX ADMIN — LORAZEPAM 2 MG: 2 INJECTION INTRAMUSCULAR; INTRAVENOUS at 18:35

## 2024-06-16 RX ADMIN — ARFORMOTEROL TARTRATE 15 MCG: 15 SOLUTION RESPIRATORY (INHALATION) at 21:16

## 2024-06-16 RX ADMIN — ATORVASTATIN CALCIUM 20 MG: 20 TABLET, FILM COATED ORAL at 11:00

## 2024-06-16 RX ADMIN — LEVALBUTEROL HYDROCHLORIDE 1.25 MG: 1.25 SOLUTION RESPIRATORY (INHALATION) at 21:26

## 2024-06-16 RX ADMIN — AZITHROMYCIN MONOHYDRATE 500 MG: 500 INJECTION, POWDER, LYOPHILIZED, FOR SOLUTION INTRAVENOUS at 06:02

## 2024-06-16 RX ADMIN — METHYLPREDNISOLONE SODIUM SUCCINATE 40 MG: 40 INJECTION INTRAMUSCULAR; INTRAVENOUS at 12:12

## 2024-06-16 RX ADMIN — ETOMIDATE 20 MG: 2 INJECTION, SOLUTION INTRAVENOUS at 22:01

## 2024-06-16 RX ADMIN — THIAMINE HYDROCHLORIDE 100 MG: 100 INJECTION, SOLUTION INTRAMUSCULAR; INTRAVENOUS at 21:28

## 2024-06-16 RX ADMIN — WATER 40 MG: 1 INJECTION INTRAMUSCULAR; INTRAVENOUS; SUBCUTANEOUS at 20:18

## 2024-06-16 RX ADMIN — BUDESONIDE 250 MCG: 0.25 INHALANT RESPIRATORY (INHALATION) at 21:09

## 2024-06-16 RX ADMIN — SODIUM CHLORIDE 1000 ML: 9 INJECTION, SOLUTION INTRAVENOUS at 07:04

## 2024-06-16 RX ADMIN — LORAZEPAM 1 MG: 2 INJECTION INTRAMUSCULAR; INTRAVENOUS at 11:01

## 2024-06-16 RX ADMIN — IOPAMIDOL 100 ML: 755 INJECTION, SOLUTION INTRAVENOUS at 08:55

## 2024-06-16 RX ADMIN — BUDESONIDE 250 MCG: 0.25 INHALANT RESPIRATORY (INHALATION) at 12:03

## 2024-06-16 RX ADMIN — WATER 125 MG: 1 INJECTION INTRAMUSCULAR; INTRAVENOUS; SUBCUTANEOUS at 06:03

## 2024-06-16 RX ADMIN — IPRATROPIUM BROMIDE AND ALBUTEROL SULFATE 1 DOSE: .5; 3 SOLUTION RESPIRATORY (INHALATION) at 23:56

## 2024-06-16 RX ADMIN — SODIUM CHLORIDE, PRESERVATIVE FREE 10 ML: 5 INJECTION INTRAVENOUS at 12:13

## 2024-06-16 RX ADMIN — INSULIN LISPRO 2 UNITS: 100 INJECTION, SOLUTION INTRAVENOUS; SUBCUTANEOUS at 23:20

## 2024-06-16 RX ADMIN — ASPIRIN 81 MG: 81 TABLET, CHEWABLE ORAL at 11:00

## 2024-06-16 RX ADMIN — SODIUM CHLORIDE 1000 ML: 9 INJECTION, SOLUTION INTRAVENOUS at 10:02

## 2024-06-16 RX ADMIN — PHENOBARBITAL SODIUM 65 MG: 65 INJECTION, SOLUTION INTRAMUSCULAR; INTRAVENOUS at 18:51

## 2024-06-16 RX ADMIN — LORAZEPAM 1 MG: 2 INJECTION INTRAMUSCULAR; INTRAVENOUS at 17:26

## 2024-06-16 RX ADMIN — FAMOTIDINE 20 MG: 10 INJECTION, SOLUTION INTRAVENOUS at 23:20

## 2024-06-16 RX ADMIN — LORAZEPAM 2 MG: 2 INJECTION INTRAMUSCULAR; INTRAVENOUS at 18:25

## 2024-06-16 RX ADMIN — SODIUM CHLORIDE 500 ML: 900 INJECTION, SOLUTION INTRAVENOUS at 21:43

## 2024-06-16 RX ADMIN — ALBUTEROL SULFATE 2.5 MG: 2.5 SOLUTION RESPIRATORY (INHALATION) at 12:03

## 2024-06-16 RX ADMIN — Medication 100 MG: at 12:12

## 2024-06-16 RX ADMIN — IPRATROPIUM BROMIDE AND ALBUTEROL SULFATE 3 DOSE: .5; 3 SOLUTION RESPIRATORY (INHALATION) at 06:07

## 2024-06-16 RX ADMIN — Medication 1 MCG/KG/HR: at 19:16

## 2024-06-16 RX ADMIN — CLOPIDOGREL BISULFATE 75 MG: 75 TABLET ORAL at 11:01

## 2024-06-16 RX ADMIN — LORAZEPAM 1 MG: 2 INJECTION INTRAMUSCULAR; INTRAVENOUS at 19:36

## 2024-06-16 RX ADMIN — IPRATROPIUM BROMIDE AND ALBUTEROL SULFATE 1 DOSE: .5; 3 SOLUTION RESPIRATORY (INHALATION) at 21:09

## 2024-06-16 RX ADMIN — LORAZEPAM 1 MG: 2 INJECTION INTRAMUSCULAR; INTRAVENOUS at 15:58

## 2024-06-16 RX ADMIN — SODIUM CHLORIDE 1000 MG: 900 INJECTION INTRAVENOUS at 23:05

## 2024-06-16 RX ADMIN — LEVALBUTEROL HYDROCHLORIDE 1.25 MG: 1.25 SOLUTION RESPIRATORY (INHALATION) at 18:50

## 2024-06-16 RX ADMIN — PHENOBARBITAL 32.4 MG: 32.4 TABLET ORAL at 18:26

## 2024-06-16 RX ADMIN — HALOPERIDOL LACTATE 5 MG: 5 INJECTION, SOLUTION INTRAMUSCULAR at 19:48

## 2024-06-16 RX ADMIN — SODIUM CHLORIDE: 9 INJECTION, SOLUTION INTRAVENOUS at 15:13

## 2024-06-16 ASSESSMENT — PULMONARY FUNCTION TESTS
PIF_VALUE: 28
PIF_VALUE: 22

## 2024-06-16 ASSESSMENT — PAIN - FUNCTIONAL ASSESSMENT: PAIN_FUNCTIONAL_ASSESSMENT: NONE - DENIES PAIN

## 2024-06-16 NOTE — H&P
Hospitalist Admission Note    NAME:   Li Casanova   : 1960   MRN: [id number]     Date/Time: 2024 10:35 AM    Patient PCP: Star Napier MD    ______________________________________________________________________  Given the patient's current clinical presentation, I have a high level of concern for decompensation if discharged from the emergency department.  Complex decision making was performed, which includes reviewing the patient's available past medical records, laboratory results, and x-ray films.       My assessment of this patient's clinical condition and my plan of care is as follows.    Assessment / Plan:    Acute COPD exacerbation  Sinus tachycardia    CTA chest  1. Emphysema, with extensive peripheral scarring and subsegmental atelectasis.  2. No pneumonia. No pulmonary embolism.  3. Mild coronary artery calcifications.  4. Pulmonary nodules in the bilateral lung bases, partially obscured. The  largest measures 9 mm. Recommend follow-up CT chest in 6 months to reassess.    IV steroids, Xopenex, Symbicort substitute  BNP is low  Recent echo reviewed, normal ef  Tachycardia in 130s, will hold metoprolol with a history of cocaine  Reports her last cocaine intake was 3 months back  Urine drug screen pending  Was given bolus 2 L, check lactic acid, continue maintenance IV fluid  Admit to stepdown unit    Acute on chronic anemia  Hemoglobin is stable  Check iron panel    History of CVA  Continue aspirin Plavix    History of cocaine use  Reports last use was 3 months back  Check urine drug screening    Alcohol use  CIWA monitoring  Monitor for withdrawal    Hypertension  Blood pressure is soft, hold hydrochlorothiazide  Continue IV fluids    Medical Decision Making:   I personally reviewed labs: CBC, BMP  I personally reviewed imaging: CTA chest  I personally reviewed EKG:  Toxic drug monitoring: IV steroid  Discussed case with: ED provider. After discussion I am in agreement that acuity of

## 2024-06-16 NOTE — ED NOTES
Messaged hospitalist as pt remains fidgety, pulling out IV, getting out of bed, and removing nasal cannula. Pt reeducated on need to use call bell before getting up, remains AxO x4 and verbalizes understanding.

## 2024-06-16 NOTE — PROGRESS NOTES
Hospitalist Progress Note    NAME:   Li Casanova   : 1960   MRN: [id number]     Date/Time: 2024 6:44 PM  Patient PCP: Star Napier MD    Estimated discharge date:  Barriers:       Assessment / Plan:    Acute hypoxic respiratory failure  Acute metabolic encephalopathy  Cocaine intoxication  Severe agitation  -Patient noted to be tachycardic with heart rate in the 140s, tachypneic with respiratory rate in the 40s and also has severe wheezing  -She received 4 mg of Ativan and also earlier received Valium but continues to be very agitated, restless and uncooperative  -Check stat ABG.  Will get a stat chest x-ray.  -Discussed with ICU team.  Will need Precedex drip.  -I discussed with Dr. Patel who agreed to take the patient to ICU.  Transfer to ICU.  Will start IV phenobarbital moderate dose.  -If worsening, she will need intubation.      I have provided    60    minutes of critical care time.  During this entire length of time I was immediately available to the patient.       The reason for providing this level of medical care was due to a critical illness that impaired one or more vital organ systems, such that there was a high probability of imminent or life threatening deterioration in the patient's condition. This care involved high complexity decision making which includes reviewing the patient's past medical records, current laboratory results, and actual Xray films in order to assess, support vital system function, and to treat this degree of vital organ system failure, and to prevent further life threatening deterioration of the patient’s condition.            Medical Decision Making:   I personally reviewed labs: CBC, BMP  I personally reviewed imaging:  I personally reviewed EKG:  Toxic drug monitoring:   Discussed case with: ICU, pharmacy and case management        Code Status: Full code  DVT Prophylaxis: Lovenox  GI Prophylaxis:    Subjective:     Chief Complaint / Reason for

## 2024-06-16 NOTE — ED NOTES
While preparing to transport pt upstairs pt states \"what do you need baby? I'm talking to my granddaughter.\" Pt states that it is February. Pt significantly more confused in the last few minutes, MD Wilberto schulte served and charge RN notified.

## 2024-06-16 NOTE — ED NOTES
Report called to [facility] at this time. Pt's ANA remains at a 9. Pt stripped out of hospital gown and put on clothes from home. States \"I thought it was time to go.\" Refusing to remove clothes and put on gown, answering orientation questions but anxious and distracted

## 2024-06-16 NOTE — CONSULTS
CRITICAL CARE NOTE      Name: Li Casanova   : 1960   MRN: [id number]   Date: 2024      REASON FOR ICU ADMISSION: Acute hypoxic respiratory failure    PRINCIPAL ICU DIAGNOSIS   Acute hypoxic respiratory failure  COPD exacerbation  Toxic metabolic encephalopathy  Cocaine intoxication/severe agitation    BRIEF PATIENT SUMMARY   64 y/o female PMHx of CAD, HTN, CVA  COPD/asthma (on chronic home 2L) chronic tobacco use disorder and polysubstance abuse use admitted () by hospital medicine for COPD exacerbation after presenting with acute onset of shortness of breath.  CTA chest-negative for PE.  She was started on systemic steroids and bronchodilators.  Per reports she has a history of daily alcohol use as well as history of polysubstance abuse.  Her UDS was positive for cocaine.  Rapid response was called due to tachycardia (140s), worsening tachypnea with RR in the 40s with diffuse wheezing throughout as well as increased agitation,  Discussed with ICU attending and was transferred to the ICU, started on Precedex infusion, phenobarbital taper and diltiazem infusion.     ~2100: HR improved with diltiazem drip, she continued to have restlessness/agitation, worsening encephalopathy, likely escalating her diffuse bronchospasm subsequently had  inability to clear airway secretions and was placed emergently on mechanical ventilation.  Continue systemic steroids and scheduled bronchodilators    COMPREHENSIVE ASSESSMENT & PLAN:SYSTEM BASED   Acute hypoxic/hypercapnic respiratory failure requiring emergent intubation -current in the setting of COPD/asthma exacerbation, with inability to protect her airway in the setting tox metabolic encephalopathy due to illicit drug ingestion/withdrawal. (Of note small glottic opening, 7.0 ETT in place)  CTA chest-leg PE, emphysema with extensive peripheral scarring and subsegmental atelectasis, pulmonary nodules in the bilateral lower bases will require  follow-up  Lung protective ventilation with goal plateau pressure < 30, driving pressure < 15  Systemic steroids, scheduled bronchodilators, continue azithromycin/ceftriaxone in the setting of COPD exacerbation +/- aspirational pneumonia    Toxic metabolic encephalopathy in the setting of cocaine intoxication/withdrawal +/- acute alcohol withdrawal  UDS-positive for cocaine, on chart review reported hx of daily alcohol use  Continue scheduled phenobarbital taper  Sedated on Precedex/propofol, SAT/SBT as indicated  Close neurological monitoring, seizure precautions    Hx of CVA with hemorrhagic conversion (10/2023)  Per chart review residual aphasia, confusion, BLE weakness  Continue DAPT    Hx of chronic tobacco use disorder  Ongoing smoking cessation counseling    Hyperglycemia- likely steroid induced:   Blood Sugar Goal 120-180, Avoid hypo/hyperglycemia      ICU DAILY CHECKLIST   Code Status:FULL  DVT Prophylaxis:Enoxaparin  T/L/D:PIV  SUP: Famotidine  Diet: NPO   Patient/Family Updated: Patients emergency contact (Amaya Sykes- [phone]) notified of transfer to ICU and MV (6/17/24) @ 0610     SUBJECTIVE   Review of Systems   Unable to perform ROS: Intubated          OBJECTIVE   Physical Exam  Vitals and nursing note reviewed.   Constitutional:       Appearance: She is ill-appearing and toxic-appearing.   HENT:      Head: Normocephalic and atraumatic.      Nose: Nose normal.   Cardiovascular:      Rate and Rhythm: Tachycardia present.      Pulses: Normal pulses.      Heart sounds: Normal heart sounds.   Pulmonary:      Effort: Respiratory distress present.      Breath sounds: Wheezing present.   Abdominal:      General: Bowel sounds are normal.      Palpations: Abdomen is soft.   Musculoskeletal:         General: Normal range of motion.      Cervical back: Normal range of motion and neck supple.      Right lower leg: No edema.      Left lower leg: No edema.   Skin:     General: Skin is warm and dry.

## 2024-06-16 NOTE — ED PROVIDER NOTES
[facility] CRITICAL CARE  EMERGENCY DEPARTMENT ENCOUNTER       Pt Name: Li Casanova  MRN: [id number]  Birthdate 1960  Date of evaluation: 6/16/2024  Provider: Laureano Dior MD   PCP: Star Napier MD  Note Started: 5:45 AM EDT 6/16/24     CHIEF COMPLAINT       Chief Complaint   Patient presents with    Shortness of Breath     Pt arrives via EMS from home with complaints of SOB starting 30 min ago. Pt has a hx of COPD wears 2L at baseline         HISTORY OF PRESENT ILLNESS: 1 or more elements      History From: Patient and EMS  HPI Limitations: None     Li Casanova is a 63 y.o. female who presents via EMS from home for shortness of breath, wheezing, coughing.  EMS reports that when they arrived she was tachypneic, wheezing, tachycardic.  Patient was given 1 albuterol treatment and route to the ER.  She reports some improvement.  Denies fever, chills.  States that she was doing well yesterday.  She felt well today until about 30 minutes prior to arrival when she suddenly felt short of breath, developed wheezing and coughing.  Patient does smoke tobacco daily.  She reports social alcohol use.  She has a history of cocaine use.  Denies recent fever, chills.  Denies leg sling.  No known history of heart failure.  Patient wears 2 L of oxygen via nasal cannula at all times.         Nursing Notes were all reviewed and agreed with or any disagreements were addressed in the HPI.     REVIEW OF SYSTEMS      Review of Systems     Positives and Pertinent negatives as per HPI.    PAST HISTORY     Past Medical History:  Past Medical History:   Diagnosis Date    Asthma     CVA (cerebral vascular accident) (HCC)     Drug abuse ([facility])     states, \"I do crack every once in a while\"         Past Surgical History:  No past surgical history on file.    Family History:  No family history on file.    Social History:  Social History     Tobacco Use    Smoking status: Every Day     Current packs/day: 1.00     Types: Cigarettes

## 2024-06-16 NOTE — PROGRESS NOTES
1935h- Bedside and Verbal shift change report given by Rapid nurse on duty. Report included the following information ED Encounter Summary, MAR, Cardiac Rhythm Sinus tachy, Alarm Parameters, Quality Measures, and Neuro Assessment.   - received pt. Restless, tachypneic- RR- 40's, HR- 150's, labored breathing. Wheezing noted. On nasal cannula at 6lpm; saturation unappreciated RT at bedside Shifted to [facility] by RT at 1olpm. Seen by CRYSTAL Erickson   1936h- given ativan 1mg, Diltiazem 10mg IV given then Diltiazem drip started at 5mg/hr, haldol 5mg IV; see MAR.  2055h- tight airways, diminished lung still having wheezing. Desaturation noted 88-89%, went up on O2 at 13lpm.  Lpm RT Cherrie Notified.  2058h- called ALIYA ROJAS  2100h- checked and seen the pt.   2110h- breathing treatment done by RT.  - Arterial Blood gas done by RT; PH- 7.26, PCO2 went up to 53 from 44.   2120h- still not improving with the breathing treatment; tachypneic- RR- 40-50's and having wheezing; RT at bedside, called ALIYA Keyes.  2135h- stopped Diltiazem drip.  2143h- NSS 500ml IV bolus given.  2145- Stopped Precedex infusion. Etomidate 20mg IV, Rocuronium Bromide 50mg IV given prior to intubation.  ETT inserted size 7, level 22cm by CRYSTAL Erickson, OGT inserted fr. 16.  2209h- started Propofol infusion initially at 20 mcg/kg/min; titrated according to response.  - Cxray done for placement verification then adjusted ETT to 21cm by RT as per CRYSTAL Erickson Started continuous IVF NSS at 75ml/hr.  - connected OGT to low intermittent suction.  0000h- reasessment done; changes dosumented in the flowsheet.  - restarted Precedex  at 0.2 mcg/kg/hr infusion; titrated according to response.  0211h- pt. Is coughing, suctioned secretions with small amount of bloody thick secretions, dose prn fentanyl 25 mcg IV given for vent compliance.  0400h- reassessment done; see flowsheet.  0600h- all meds given. Currently ips running on max dose of propofol 50 mcg/kg/hr, Precedex at 0.8

## 2024-06-16 NOTE — ED NOTES
Assumed care of pt at this time. Bedside report received from CHANDANA Tipton. Pt resting in bed on monitor x3 with call bell in reach.

## 2024-06-16 NOTE — DISCHARGE INSTRUCTIONS
HOSPITALIST DISCHARGE INSTRUCTIONS    NAME: Li Casanova   :  1960   MRN:  [id number]     Date/Time:  2024 12:10 PM    ADMIT DATE: 2024   DISCHARGE DATE: 2024     Attending Physician: Angela Duque MD  Acute hypoxic/hypercapnic respiratory failure requiring emergent intubation - in the setting of COPD/asthma exacerbation, with inability to protect airways in the setting tox metabolic encephalopathy due to illicit drug ingestion/withdrawal.   Worsening anemia  Leukocytosis most likely reactive  Discontinue steroid  Toxic metabolic encephalopathy in the setting of cocaine intoxication/withdrawal +/- acute alcohol withdrawal  Hx of CVA with hemorrhagic conversion (10/2023)  Hyperglycemia- likely steroid induced:   Tachycardia - sinus tachycardia on initial EKG  Urinary retention     Medications: Per above medication reconciliation.    Pain Management: per above medications    Recommended diet: cardiac diet    Recommended activity: activity as tolerated    Wound care: None    Indwelling devices:  None    Supplemental Oxygen: None    Required Lab work: Per SNF routine    Glucose management:  Accucheck ACHS with sliding scale per SNF protocol    Code status: Full code

## 2024-06-16 NOTE — PROGRESS NOTES
Responded to RRT for increased agitation. ABG done, 2mg ativan given in addition to the 2mg ativan just given prior to arrival. Attempted neb tx but pt would not leave on. Restraints applied. Phenobarb changed from PO to IV.  Tx to CCU for precedex.

## 2024-06-16 NOTE — ED NOTES
Pt increasingly anxious and fidgety, pulled out Ivs. CIWA 9, pt states \"I can't get comfortable.

## 2024-06-17 ENCOUNTER — APPOINTMENT (OUTPATIENT)
Facility: HOSPITAL | Age: 64
End: 2024-06-17
Payer: MEDICAID

## 2024-06-17 LAB
ANION GAP SERPL CALC-SCNC: 5 MMOL/L (ref 5–15)
BASE DEFICIT BLD-SCNC: 1.1 MMOL/L
BASOPHILS # BLD: 0 K/UL (ref 0–0.1)
BASOPHILS NFR BLD: 0 % (ref 0–1)
BUN SERPL-MCNC: 15 MG/DL (ref 6–20)
BUN/CREAT SERPL: 18 (ref 12–20)
CALCIUM SERPL-MCNC: 9.5 MG/DL (ref 8.5–10.1)
CHLORIDE SERPL-SCNC: 111 MMOL/L (ref 97–108)
CO2 SERPL-SCNC: 24 MMOL/L (ref 21–32)
CREAT SERPL-MCNC: 0.84 MG/DL (ref 0.55–1.02)
DIFFERENTIAL METHOD BLD: ABNORMAL
EOSINOPHIL # BLD: 0 K/UL (ref 0–0.4)
EOSINOPHIL NFR BLD: 0 % (ref 0–7)
ERYTHROCYTE [DISTWIDTH] IN BLOOD BY AUTOMATED COUNT: 21.8 % (ref 11.5–14.5)
FERRITIN SERPL-MCNC: 20 NG/ML (ref 26–388)
GAS FLOW.O2 SETTING OXYMISER: 24 BPM
GLUCOSE BLD STRIP.AUTO-MCNC: 177 MG/DL (ref 65–117)
GLUCOSE BLD STRIP.AUTO-MCNC: 195 MG/DL (ref 65–117)
GLUCOSE BLD STRIP.AUTO-MCNC: 196 MG/DL (ref 65–117)
GLUCOSE SERPL-MCNC: 180 MG/DL (ref 65–100)
HCO3 BLD-SCNC: 24.6 MMOL/L (ref 22–26)
HCT VFR BLD AUTO: 25.6 % (ref 35–47)
HGB BLD-MCNC: 7.8 G/DL (ref 11.5–16)
IMM GRANULOCYTES # BLD AUTO: 0.2 K/UL (ref 0–0.04)
IMM GRANULOCYTES NFR BLD AUTO: 1 % (ref 0–0.5)
IRON SATN MFR SERPL: 6 % (ref 20–50)
IRON SERPL-MCNC: 21 UG/DL (ref 35–150)
LYMPHOCYTES # BLD: 1 K/UL (ref 0.8–3.5)
LYMPHOCYTES NFR BLD: 6 % (ref 12–49)
MAGNESIUM SERPL-MCNC: 2.3 MG/DL (ref 1.6–2.4)
MCH RBC QN AUTO: 27.2 PG (ref 26–34)
MCHC RBC AUTO-ENTMCNC: 30.5 G/DL (ref 30–36.5)
MCV RBC AUTO: 89.2 FL (ref 80–99)
MONOCYTES # BLD: 0.5 K/UL (ref 0–1)
MONOCYTES NFR BLD: 3 % (ref 5–13)
NEUTS SEG # BLD: 14.9 K/UL (ref 1.8–8)
NEUTS SEG NFR BLD: 90 % (ref 32–75)
NRBC # BLD: 0 K/UL (ref 0–0.01)
NRBC BLD-RTO: 0 PER 100 WBC
O2/TOTAL GAS SETTING VFR VENT: 80 %
PCO2 BLD: 44.5 MMHG (ref 35–45)
PEEP RESPIRATORY: 5 CMH2O
PH BLD: 7.35 (ref 7.35–7.45)
PHOSPHATE SERPL-MCNC: 2.1 MG/DL (ref 2.6–4.7)
PLATELET # BLD AUTO: 178 K/UL (ref 150–400)
PMV BLD AUTO: 11.7 FL (ref 8.9–12.9)
PO2 BLD: 141 MMHG (ref 80–100)
POTASSIUM SERPL-SCNC: 4.2 MMOL/L (ref 3.5–5.1)
PROCALCITONIN SERPL-MCNC: <0.05 NG/ML
RBC # BLD AUTO: 2.87 M/UL (ref 3.8–5.2)
RBC MORPH BLD: ABNORMAL
SAO2 % BLD: 99 % (ref 92–97)
SERVICE CMNT-IMP: ABNORMAL
SODIUM SERPL-SCNC: 140 MMOL/L (ref 136–145)
SPECIMEN TYPE: ABNORMAL
TIBC SERPL-MCNC: 361 UG/DL (ref 250–450)
VENTILATION MODE VENT: ABNORMAL
VT SETTING VENT: 340 ML
WBC # BLD AUTO: 16.6 K/UL (ref 3.6–11)

## 2024-06-17 PROCEDURE — 83540 ASSAY OF IRON: CPT

## 2024-06-17 PROCEDURE — 2580000003 HC RX 258: Performed by: INTERNAL MEDICINE

## 2024-06-17 PROCEDURE — 74018 RADEX ABDOMEN 1 VIEW: CPT

## 2024-06-17 PROCEDURE — 6370000000 HC RX 637 (ALT 250 FOR IP): Performed by: NURSE PRACTITIONER

## 2024-06-17 PROCEDURE — 2000000000 HC ICU R&B

## 2024-06-17 PROCEDURE — 87070 CULTURE OTHR SPECIMN AEROBIC: CPT

## 2024-06-17 PROCEDURE — 6360000002 HC RX W HCPCS: Performed by: INTERNAL MEDICINE

## 2024-06-17 PROCEDURE — 82962 GLUCOSE BLOOD TEST: CPT

## 2024-06-17 PROCEDURE — 82728 ASSAY OF FERRITIN: CPT

## 2024-06-17 PROCEDURE — 2580000003 HC RX 258: Performed by: STUDENT IN AN ORGANIZED HEALTH CARE EDUCATION/TRAINING PROGRAM

## 2024-06-17 PROCEDURE — 85025 COMPLETE CBC W/AUTO DIFF WBC: CPT

## 2024-06-17 PROCEDURE — 6360000002 HC RX W HCPCS: Performed by: STUDENT IN AN ORGANIZED HEALTH CARE EDUCATION/TRAINING PROGRAM

## 2024-06-17 PROCEDURE — 80048 BASIC METABOLIC PNL TOTAL CA: CPT

## 2024-06-17 PROCEDURE — 2580000003 HC RX 258: Performed by: NURSE PRACTITIONER

## 2024-06-17 PROCEDURE — 87077 CULTURE AEROBIC IDENTIFY: CPT

## 2024-06-17 PROCEDURE — 6370000000 HC RX 637 (ALT 250 FOR IP): Performed by: INTERNAL MEDICINE

## 2024-06-17 PROCEDURE — 6360000002 HC RX W HCPCS: Performed by: NURSE PRACTITIONER

## 2024-06-17 PROCEDURE — 2700000000 HC OXYGEN THERAPY PER DAY

## 2024-06-17 PROCEDURE — 83735 ASSAY OF MAGNESIUM: CPT

## 2024-06-17 PROCEDURE — 87205 SMEAR GRAM STAIN: CPT

## 2024-06-17 PROCEDURE — 83550 IRON BINDING TEST: CPT

## 2024-06-17 PROCEDURE — 94640 AIRWAY INHALATION TREATMENT: CPT

## 2024-06-17 PROCEDURE — 82803 BLOOD GASES ANY COMBINATION: CPT

## 2024-06-17 PROCEDURE — 2500000003 HC RX 250 WO HCPCS: Performed by: INTERNAL MEDICINE

## 2024-06-17 PROCEDURE — 2500000003 HC RX 250 WO HCPCS: Performed by: NURSE PRACTITIONER

## 2024-06-17 PROCEDURE — 84100 ASSAY OF PHOSPHORUS: CPT

## 2024-06-17 PROCEDURE — 94003 VENT MGMT INPAT SUBQ DAY: CPT

## 2024-06-17 PROCEDURE — 87186 SC STD MICRODIL/AGAR DIL: CPT

## 2024-06-17 PROCEDURE — 36415 COLL VENOUS BLD VENIPUNCTURE: CPT

## 2024-06-17 PROCEDURE — 84145 PROCALCITONIN (PCT): CPT

## 2024-06-17 RX ORDER — GAUZE BANDAGE 2" X 2"
100 BANDAGE TOPICAL DAILY
Status: DISCONTINUED | OUTPATIENT
Start: 2024-06-17 | End: 2024-06-24 | Stop reason: SDUPTHER

## 2024-06-17 RX ORDER — ROCURONIUM BROMIDE 50 MG/5 ML
50 SYRINGE (ML) INTRAVENOUS ONCE
Status: COMPLETED | OUTPATIENT
Start: 2024-06-17 | End: 2024-06-16

## 2024-06-17 RX ORDER — CHLORHEXIDINE GLUCONATE ORAL RINSE 1.2 MG/ML
15 SOLUTION DENTAL 2 TIMES DAILY
Status: DISCONTINUED | OUTPATIENT
Start: 2024-06-17 | End: 2024-06-29 | Stop reason: HOSPADM

## 2024-06-17 RX ADMIN — IPRATROPIUM BROMIDE AND ALBUTEROL SULFATE 1 DOSE: .5; 3 SOLUTION RESPIRATORY (INHALATION) at 04:50

## 2024-06-17 RX ADMIN — PROPOFOL 30 MCG/KG/MIN: 10 INJECTION, EMULSION INTRAVENOUS at 18:08

## 2024-06-17 RX ADMIN — Medication 1 AMPULE: at 21:01

## 2024-06-17 RX ADMIN — AZITHROMYCIN MONOHYDRATE 500 MG: 500 INJECTION, POWDER, LYOPHILIZED, FOR SOLUTION INTRAVENOUS at 05:39

## 2024-06-17 RX ADMIN — FAMOTIDINE 20 MG: 10 INJECTION, SOLUTION INTRAVENOUS at 21:01

## 2024-06-17 RX ADMIN — FOLIC ACID 1 MG: 1 TABLET ORAL at 08:36

## 2024-06-17 RX ADMIN — ARFORMOTEROL TARTRATE 15 MCG: 15 SOLUTION RESPIRATORY (INHALATION) at 20:12

## 2024-06-17 RX ADMIN — ARFORMOTEROL TARTRATE 15 MCG: 15 SOLUTION RESPIRATORY (INHALATION) at 10:18

## 2024-06-17 RX ADMIN — SODIUM CHLORIDE: 9 INJECTION, SOLUTION INTRAVENOUS at 05:22

## 2024-06-17 RX ADMIN — Medication 0.8 MCG/KG/HR: at 05:39

## 2024-06-17 RX ADMIN — Medication 100 MG: at 21:01

## 2024-06-17 RX ADMIN — DIBASIC SODIUM PHOSPHATE, MONOBASIC POTASSIUM PHOSPHATE AND MONOBASIC SODIUM PHOSPHATE 2 TABLET: 852; 155; 130 TABLET ORAL at 21:00

## 2024-06-17 RX ADMIN — Medication 0.8 MCG/KG/HR: at 14:24

## 2024-06-17 RX ADMIN — 0.12% CHLORHEXIDINE GLUCONATE 15 ML: 1.2 RINSE ORAL at 21:00

## 2024-06-17 RX ADMIN — PHENOBARBITAL SODIUM 65 MG: 65 INJECTION, SOLUTION INTRAMUSCULAR; INTRAVENOUS at 07:55

## 2024-06-17 RX ADMIN — CLOPIDOGREL BISULFATE 75 MG: 75 TABLET ORAL at 08:36

## 2024-06-17 RX ADMIN — Medication 0.8 MCG/KG/HR: at 23:14

## 2024-06-17 RX ADMIN — IPRATROPIUM BROMIDE AND ALBUTEROL SULFATE 1 DOSE: .5; 3 SOLUTION RESPIRATORY (INHALATION) at 12:30

## 2024-06-17 RX ADMIN — PROPOFOL 40 MCG/KG/MIN: 10 INJECTION, EMULSION INTRAVENOUS at 11:04

## 2024-06-17 RX ADMIN — ENOXAPARIN SODIUM 40 MG: 100 INJECTION SUBCUTANEOUS at 08:35

## 2024-06-17 RX ADMIN — 0.12% CHLORHEXIDINE GLUCONATE 15 ML: 1.2 RINSE ORAL at 10:07

## 2024-06-17 RX ADMIN — BUDESONIDE 250 MCG: 0.25 INHALANT RESPIRATORY (INHALATION) at 10:18

## 2024-06-17 RX ADMIN — DIBASIC SODIUM PHOSPHATE, MONOBASIC POTASSIUM PHOSPHATE AND MONOBASIC SODIUM PHOSPHATE 2 TABLET: 852; 155; 130 TABLET ORAL at 06:46

## 2024-06-17 RX ADMIN — WATER 40 MG: 1 INJECTION INTRAMUSCULAR; INTRAVENOUS; SUBCUTANEOUS at 08:32

## 2024-06-17 RX ADMIN — SODIUM CHLORIDE, PRESERVATIVE FREE 10 ML: 5 INJECTION INTRAVENOUS at 21:03

## 2024-06-17 RX ADMIN — PHENOBARBITAL SODIUM 65 MG: 65 INJECTION, SOLUTION INTRAMUSCULAR; INTRAVENOUS at 12:02

## 2024-06-17 RX ADMIN — PHENOBARBITAL SODIUM 32.5 MG: 65 INJECTION, SOLUTION INTRAMUSCULAR; INTRAVENOUS at 18:09

## 2024-06-17 RX ADMIN — WATER 40 MG: 1 INJECTION INTRAMUSCULAR; INTRAVENOUS; SUBCUTANEOUS at 00:42

## 2024-06-17 RX ADMIN — PROPOFOL 50 MCG/KG/MIN: 10 INJECTION, EMULSION INTRAVENOUS at 03:34

## 2024-06-17 RX ADMIN — SODIUM CHLORIDE, PRESERVATIVE FREE 10 ML: 5 INJECTION INTRAVENOUS at 21:02

## 2024-06-17 RX ADMIN — SODIUM CHLORIDE, PRESERVATIVE FREE 10 ML: 5 INJECTION INTRAVENOUS at 08:00

## 2024-06-17 RX ADMIN — IPRATROPIUM BROMIDE AND ALBUTEROL SULFATE 1 DOSE: .5; 3 SOLUTION RESPIRATORY (INHALATION) at 16:18

## 2024-06-17 RX ADMIN — WATER 40 MG: 1 INJECTION INTRAMUSCULAR; INTRAVENOUS; SUBCUTANEOUS at 13:08

## 2024-06-17 RX ADMIN — WATER 40 MG: 1 INJECTION INTRAMUSCULAR; INTRAVENOUS; SUBCUTANEOUS at 21:01

## 2024-06-17 RX ADMIN — SODIUM CHLORIDE, PRESERVATIVE FREE 10 ML: 5 INJECTION INTRAVENOUS at 07:56

## 2024-06-17 RX ADMIN — PHENOBARBITAL SODIUM 65 MG: 65 INJECTION, SOLUTION INTRAMUSCULAR; INTRAVENOUS at 00:10

## 2024-06-17 RX ADMIN — FAMOTIDINE 20 MG: 10 INJECTION, SOLUTION INTRAVENOUS at 08:36

## 2024-06-17 RX ADMIN — BUDESONIDE 250 MCG: 0.25 INHALANT RESPIRATORY (INHALATION) at 20:12

## 2024-06-17 RX ADMIN — FENTANYL CITRATE 25 MCG: 50 INJECTION INTRAMUSCULAR; INTRAVENOUS at 02:11

## 2024-06-17 RX ADMIN — IPRATROPIUM BROMIDE AND ALBUTEROL SULFATE 1 DOSE: .5; 3 SOLUTION RESPIRATORY (INHALATION) at 20:13

## 2024-06-17 RX ADMIN — ASPIRIN 81 MG: 81 TABLET, CHEWABLE ORAL at 08:36

## 2024-06-17 RX ADMIN — IPRATROPIUM BROMIDE AND ALBUTEROL SULFATE 1 DOSE: .5; 3 SOLUTION RESPIRATORY (INHALATION) at 10:10

## 2024-06-17 RX ADMIN — Medication 1 AMPULE: at 09:20

## 2024-06-17 ASSESSMENT — PULMONARY FUNCTION TESTS
PIF_VALUE: 22
PIF_VALUE: 21
PIF_VALUE: 26
PIF_VALUE: 22
PIF_VALUE: 22

## 2024-06-17 ASSESSMENT — PAIN SCALES - GENERAL
PAINLEVEL_OUTOF10: 0
PAINLEVEL_OUTOF10: 0

## 2024-06-17 NOTE — PLAN OF CARE
Problem: Respiratory - Adult  Goal: Achieves optimal ventilation and oxygenation  Outcome: Progressing  Flowsheets  Taken 6/17/2024 0229  Achieves optimal ventilation and oxygenation:   Assess for changes in respiratory status   Assess for changes in mentation and behavior   Position to facilitate oxygenation and minimize respiratory effort   Oxygen supplementation based on oxygen saturation or arterial blood gases   Initiate smoking cessation protocol as indicated   Encourage broncho-pulmonary hygiene including cough, deep breathe, incentive spirometry   Assess the need for suctioning and aspirate as needed   Assess and instruct to report shortness of breath or any respiratory difficulty  Taken 6/16/2024 1935  Achieves optimal ventilation and oxygenation: Assess for changes in respiratory status     Problem: Safety - Adult  Goal: Free from fall injury  Recent Flowsheet Documentation  Taken 6/16/2024 2000 by Johana Perrin RN  Free From Fall Injury: Based on caregiver fall risk screen, instruct family/caregiver to ask for assistance with transferring infant if caregiver noted to have fall risk factors

## 2024-06-17 NOTE — PLAN OF CARE
Problem: Discharge Planning  Goal: Discharge to home or other facility with appropriate resources  Outcome: Progressing  Flowsheets (Taken 6/17/2024 0800)  Discharge to home or other facility with appropriate resources:   Identify barriers to discharge with patient and caregiver   Arrange for needed discharge resources and transportation as appropriate   Identify discharge learning needs (meds, wound care, etc)   Refer to discharge planning if patient needs post-hospital services based on physician order or complex needs related to functional status, cognitive ability or social support system     Problem: Skin/Tissue Integrity  Goal: Absence of new skin breakdown  Description: 1.  Monitor for areas of redness and/or skin breakdown  2.  Assess vascular access sites hourly  3.  Every 4-6 hours minimum:  Change oxygen saturation probe site  4.  Every 4-6 hours:  If on nasal continuous positive airway pressure, respiratory therapy assess nares and determine need for appliance change or resting period.  Outcome: Progressing     Problem: Safety - Adult  Goal: Free from fall injury  Outcome: Progressing  Flowsheets (Taken 6/16/2024 2000 by Johana Perrin RN)  Free From Fall Injury: Based on caregiver fall risk screen, instruct family/caregiver to ask for assistance with transferring infant if caregiver noted to have fall risk factors     Problem: Safety - Medical Restraint  Goal: Remains free of injury from restraints (Restraint for Interference with Medical Device)  Description: INTERVENTIONS:  1. Determine that other, less restrictive measures have been tried or would not be effective before applying the restraint  2. Evaluate the patient's condition at the time of restraint application  3. Inform patient/family regarding the reason for restraint  4. Q2H: Monitor safety, psychosocial status, comfort, nutrition and hydration  Outcome: Progressing  Flowsheets  Taken 6/17/2024 0730 by Faith Alvarez RN  Remains  oxygenation and minimize respiratory effort   Oxygen supplementation based on oxygen saturation or arterial blood gases   Initiate smoking cessation protocol as indicated   Encourage broncho-pulmonary hygiene including cough, deep breathe, incentive spirometry   Assess the need for suctioning and aspirate as needed   Assess and instruct to report shortness of breath or any respiratory difficulty  Taken 6/16/2024 1935  Achieves optimal ventilation and oxygenation: Assess for changes in respiratory status     Problem: Pain  Goal: Verbalizes/displays adequate comfort level or baseline comfort level  Outcome: Progressing  Flowsheets (Taken 6/17/2024 0800)  Verbalizes/displays adequate comfort level or baseline comfort level:   Encourage patient to monitor pain and request assistance   Assess pain using appropriate pain scale   Administer analgesics based on type and severity of pain and evaluate response   Implement non-pharmacological measures as appropriate and evaluate response   Consider cultural and social influences on pain and pain management   Notify Licensed Independent Practitioner if interventions unsuccessful or patient reports new pain     Problem: Chronic Conditions and Co-morbidities  Goal: Patient's chronic conditions and co-morbidity symptoms are monitored and maintained or improved  Outcome: Progressing  Flowsheets (Taken 6/17/2024 0800)  Care Plan - Patient's Chronic Conditions and Co-Morbidity Symptoms are Monitored and Maintained or Improved:   Monitor and assess patient's chronic conditions and comorbid symptoms for stability, deterioration, or improvement   Collaborate with multidisciplinary team to address chronic and comorbid conditions and prevent exacerbation or deterioration   Update acute care plan with appropriate goals if chronic or comorbid symptoms are exacerbated and prevent overall improvement and discharge

## 2024-06-17 NOTE — PROGRESS NOTES
CRITICAL CARE NOTE      Name: Li Casanova   : 1960   MRN: [id number]   Date: 2024      REASON FOR ICU ADMISSION: Acute hypoxic respiratory failure    PRINCIPAL ICU DIAGNOSIS   Acute hypoxic respiratory failure  COPD exacerbation  Toxic metabolic encephalopathy  Cocaine intoxication/severe agitation    BRIEF PATIENT SUMMARY   62 y/o female PMHx of CAD, HTN, CVA  COPD/asthma (on chronic home 2L) chronic tobacco use disorder and polysubstance abuse use admitted () by hospital medicine for COPD exacerbation after presenting with acute onset of shortness of breath.  CTA chest-negative for PE.  She was started on systemic steroids and bronchodilators.  Per reports she has a history of daily alcohol use as well as history of polysubstance abuse.  Her UDS was positive for cocaine.  Rapid response was called due to tachycardia (140s), worsening tachypnea with RR in the 40s with diffuse wheezing throughout as well as increased agitation,  Discussed with ICU attending and was transferred to the ICU, started on Precedex infusion, phenobarbital taper and diltiazem infusion.     ~2100: HR improved with diltiazem drip, she continued to have restlessness/agitation, worsening encephalopathy, likely escalating her diffuse bronchospasm subsequently had  inability to clear airway secretions and was placed emergently on mechanical ventilation.  Continue systemic steroids and scheduled bronchodilators    : resting comfortably on the ventilator    COMPREHENSIVE ASSESSMENT & PLAN:SYSTEM BASED   Acute hypoxic/hypercapnic respiratory failure requiring emergent intubation -current in the setting of COPD/asthma exacerbation, with inability to protect her airway in the setting tox metabolic encephalopathy due to illicit drug ingestion/withdrawal. (Of note small glottic opening, 7.0 ETT in place)  CTA chest-leg PE, emphysema with extensive peripheral scarring and subsegmental atelectasis, pulmonary nodules in the

## 2024-06-17 NOTE — PROGRESS NOTES
attended interdisciplinary rounds. Patient's medical status was discussed.     Rev. DELVIN Patel  [facility]   Paging Service [phone]    Rev. DELVIN Patel  [facility]   Paging Service [phone])

## 2024-06-17 NOTE — PROGRESS NOTES
Physician Progress Note      PATIENT:               JOSE SABILLON  CSN #:                  [id number]  :                       1960  ADMIT DATE:       2024 5:20 AM  DISCH DATE:  RESPONDING  PROVIDER #:        Jorge Amador MD        QUERY TEXT:    Type of Anemia: Please provide further specificity, if known.    Clinical indicators include: chronic anemia, emoglobin, iron, ferrous sulfate,   fe, rbc, hemoglobin, hematocrit, platelets  Options provided:  -- Anemia due to acute blood loss  -- Anemia due to chronic blood loss  -- Anemia due to iron deficiency  -- Anemia due to postoperative blood loss  -- Anemia due to chronic disease  -- Other - I will add my own diagnosis  -- Disagree - Not applicable / Not valid  -- Disagree - Clinically Unable to determine / Unknown        PROVIDER RESPONSE TEXT:    The patient has anemia due to chronic disease.      Electronically signed by:  Jorge Amador MD 2024 2:05 PM

## 2024-06-17 NOTE — PROGRESS NOTES
Pt is a new admit from ED, arrived around 1750. Pt noted agitated and restless upon arrival, on O2 3 L. Pt noted tachycardic on tele with -150s. Rapid response called, Dr. Fairbanks notified. Pt scored 21 on CIWA. A total of 4 mg IV ativan given less than an hour, and phenobarbital PO and IV given as PO phenobarb was switched to IV. ABG done. Pt transferred to ICU. Report given to RN at bedside.

## 2024-06-17 NOTE — PROCEDURES
PROCEDURE NOTE  Date: 6/16/2024   Name: Li Casanova  YOB: 1960         Procedure Note - Intubation:   Performed by JESUS Hylton NP .     Diagnosis: Acute hypoxic/hypercapnic respiratory failure  Insertion Date: 6/16/2024 time: 2145  Obtained Consent? N/A; emergent   Procedure Location:  ICU.      Immediately prior to the procedure, the patient was reevaluated and found suitable for the planned procedure and any planned medications.  Immediately prior to the procedure a time out was called to verify the correct patient, procedure, equipment, staff, and marking as appropriate.    Preoxygenation applied via BVM  Medications given were etomidate and rocuronium (Zemuron).   A number 7.0 cuffed was placed (unable to pass 7.5 d/t small glottic opening with edema)  ETT was placed to 22 cm at the teeth.   Placement was evaluated by noting bilateral, symmetric breath sounds, good end-tidal CO2 detector color change , and no breath sounds over stomach.    Attempts required: 2.    Complications: none.    RSI was used..  The procedure was tolerated well.  A follow-up chest x-ray was ordered post procedure.      JESUS Hylton NP  Critical Care Medicine  [facility]

## 2024-06-17 NOTE — PROGRESS NOTES
Comprehensive Nutrition Assessment    Type and Reason for Visit:  Initial, Consult    Nutrition Recommendations/Plan:   Initiate TF via OGT:  Start Osmolite 1.2 @ 15mL/h, advance rate 10mL q 8h as tolerated to Goal of 35mL/h + Prosource daily + 100mL flush q 4h (provides 1088kcals/66gPro/130gCHO/1288mL)   Continue thiamine and folate     Malnutrition Assessment:  Malnutrition Status:  Insufficient data (06/17/24 1341)        Nutrition Assessment:  Pt admitted with COPD exacerbation.  PMH: CVA, CAD, HTN, COPD, ETOH abuse, cocaine use.  Chart reviewed, case discussed during CCU rounds.  Pt intubated and sedated with propofol @ 12.7mL/h, which provides 335 kcals daily.  Per IDR discussion okay to start TF, see orders above.  No family in the room to speak with.  No fat/muscle wasting noted per NFPE.  TF at goal + propofol will meet 108% kcal and 100% protein needs.  -213, likely 2' steroids.  Phos 2.1, being repleted.  MST not filled out.   Wt Readings from Last 10 Encounters:   06/16/24 60.5 kg (133 lb 6.1 oz)   06/08/24 59 kg (130 lb 1.1 oz)   06/05/24 59 kg (130 lb)   05/25/24 58.6 kg (129 lb 3 oz)   05/17/24 56.8 kg (125 lb 3.5 oz)   04/20/24 56.8 kg (125 lb 3.5 oz)   03/21/24 56.8 kg (125 lb 3.5 oz)   09/16/23 59 kg (130 lb)            Nutrition Related Findings:    Meds: azithromycin, rocephin, pepcid, Kphos, thiamine, folvite, lispro, solumedrol, NS@75mL/h;   Drips: precedex, propofol.    BM: 6/16   Wound Type: None       Current Nutrition Intake & Therapies:    Average Meal Intake: NPO         Anthropometric Measures:  Height: 149.9 cm (4' 11\")  Ideal Body Weight (IBW): 95 lbs (43 kg)       Current Body Weight: 60.5 kg (133 lb 6.1 oz), 140.4 % IBW. Weight Source: Bed Scale  Current BMI (kg/m2): 26.9                          BMI Categories: Normal Weight (BMI 22.0 to 24.9) age over 65    Estimated Daily Nutrient Needs:  Energy Requirements Based On: Formula  Weight Used for Energy Requirements:  Current  Energy (kcal/day): PSU 1318 (MSJ 1071)  Weight Used for Protein Requirements: Current  Protein (g/day): 60-72g (1-1.2gPro/kg)  Method Used for Fluid Requirements: 1 ml/kcal  Fluid (ml/day): 1320mL    Nutrition Diagnosis:   Inadequate protein-energy intake related to impaired respiratory function as evidenced by NPO or clear liquid status due to medical condition    Nutrition Interventions:   Food and/or Nutrient Delivery: Start Tube Feeding  Nutrition Education/Counseling: No recommendation at this time  Coordination of Nutrition Care: Continue to monitor while inpatient, Interdisciplinary Rounds       Goals:     Goals: Initiate nutrition support, by next RD assessment, Tolerate nutrition support at goal rate (with residuals <500mL)       Nutrition Monitoring and Evaluation:   Behavioral-Environmental Outcomes: None Identified  Food/Nutrient Intake Outcomes: Enteral Nutrition Intake/Tolerance, IVF Intake  Physical Signs/Symptoms Outcomes: Biochemical Data, Nutrition Focused Physical Findings, Skin, Weight, Fluid Status or Edema, Hemodynamic Status    Discharge Planning:    Too soon to determine     Melinda Nixon RD, CNSC  Contact: ext [phone]

## 2024-06-17 NOTE — PROGRESS NOTES
0715: Bedside and Verbal shift change report given to Yissel Redding RN (oncoming nurse) by Johana Perrin RN (offgoing nurse). Report included the following information Nurse Handoff Report, Index, ED Encounter Summary, ED SBAR, Adult Overview, Intake/Output, MAR, Recent Results, Cardiac Rhythm SR, Alarm Parameters, Quality Measures, and Neuro Assessment.     0800: Assessment completed. Pt repositioned in bed.     1000: Pt repositioned in bed.     1200: Reassessment completed. Pt repositioned in bed.     1400: Pt repositioned in bed. Tube feeds started per order.     1600: Reassessment completed. Pt repositioned in bed.     1800: Pt repositioned in bed.     1920: Bedside and Verbal shift change report given to Yael Guerrero RN (oncoming nurse) by Yissel Redding RN (offgoing nurse). Report included the following information Nurse Handoff Report, Index, ED Encounter Summary, ED SBAR, Adult Overview, Intake/Output, MAR, Recent Results, Cardiac Rhythm SR, Alarm Parameters, Quality Measures, and Neuro Assessment.

## 2024-06-18 LAB
ANION GAP SERPL CALC-SCNC: 7 MMOL/L (ref 5–15)
BUN SERPL-MCNC: 20 MG/DL (ref 6–20)
BUN/CREAT SERPL: 22 (ref 12–20)
CALCIUM SERPL-MCNC: 9 MG/DL (ref 8.5–10.1)
CHLORIDE SERPL-SCNC: 110 MMOL/L (ref 97–108)
CO2 SERPL-SCNC: 23 MMOL/L (ref 21–32)
CREAT SERPL-MCNC: 0.89 MG/DL (ref 0.55–1.02)
EKG ATRIAL RATE: 133 BPM
EKG DIAGNOSIS: NORMAL
EKG P AXIS: 7 DEGREES
EKG P-R INTERVAL: 134 MS
EKG Q-T INTERVAL: 312 MS
EKG QRS DURATION: 64 MS
EKG QTC CALCULATION (BAZETT): 464 MS
EKG R AXIS: 44 DEGREES
EKG T AXIS: -9 DEGREES
EKG VENTRICULAR RATE: 133 BPM
ERYTHROCYTE [DISTWIDTH] IN BLOOD BY AUTOMATED COUNT: 22.7 % (ref 11.5–14.5)
GLUCOSE BLD STRIP.AUTO-MCNC: 175 MG/DL (ref 65–117)
GLUCOSE BLD STRIP.AUTO-MCNC: 179 MG/DL (ref 65–117)
GLUCOSE BLD STRIP.AUTO-MCNC: 189 MG/DL (ref 65–117)
GLUCOSE SERPL-MCNC: 180 MG/DL (ref 65–100)
HCT VFR BLD AUTO: 25.8 % (ref 35–47)
HGB BLD-MCNC: 8 G/DL (ref 11.5–16)
MAGNESIUM SERPL-MCNC: 2.4 MG/DL (ref 1.6–2.4)
MCH RBC QN AUTO: 26.9 PG (ref 26–34)
MCHC RBC AUTO-ENTMCNC: 31 G/DL (ref 30–36.5)
MCV RBC AUTO: 86.9 FL (ref 80–99)
NRBC # BLD: 0 K/UL (ref 0–0.01)
NRBC BLD-RTO: 0 PER 100 WBC
PHOSPHATE SERPL-MCNC: 3.3 MG/DL (ref 2.6–4.7)
PLATELET # BLD AUTO: 215 K/UL (ref 150–400)
PMV BLD AUTO: 11.8 FL (ref 8.9–12.9)
POTASSIUM SERPL-SCNC: 4.1 MMOL/L (ref 3.5–5.1)
RBC # BLD AUTO: 2.97 M/UL (ref 3.8–5.2)
SERVICE CMNT-IMP: ABNORMAL
SODIUM SERPL-SCNC: 140 MMOL/L (ref 136–145)
TRIGL SERPL-MCNC: 462 MG/DL
WBC # BLD AUTO: 16.8 K/UL (ref 3.6–11)

## 2024-06-18 PROCEDURE — 94003 VENT MGMT INPAT SUBQ DAY: CPT

## 2024-06-18 PROCEDURE — 85027 COMPLETE CBC AUTOMATED: CPT

## 2024-06-18 PROCEDURE — 83735 ASSAY OF MAGNESIUM: CPT

## 2024-06-18 PROCEDURE — 2500000003 HC RX 250 WO HCPCS: Performed by: NURSE PRACTITIONER

## 2024-06-18 PROCEDURE — 2580000003 HC RX 258: Performed by: NURSE PRACTITIONER

## 2024-06-18 PROCEDURE — 6360000002 HC RX W HCPCS: Performed by: STUDENT IN AN ORGANIZED HEALTH CARE EDUCATION/TRAINING PROGRAM

## 2024-06-18 PROCEDURE — 84100 ASSAY OF PHOSPHORUS: CPT

## 2024-06-18 PROCEDURE — 6360000002 HC RX W HCPCS: Performed by: NURSE PRACTITIONER

## 2024-06-18 PROCEDURE — 6360000002 HC RX W HCPCS: Performed by: INTERNAL MEDICINE

## 2024-06-18 PROCEDURE — 2580000003 HC RX 258: Performed by: INTERNAL MEDICINE

## 2024-06-18 PROCEDURE — 80048 BASIC METABOLIC PNL TOTAL CA: CPT

## 2024-06-18 PROCEDURE — 6370000000 HC RX 637 (ALT 250 FOR IP): Performed by: NURSE PRACTITIONER

## 2024-06-18 PROCEDURE — 2580000003 HC RX 258: Performed by: STUDENT IN AN ORGANIZED HEALTH CARE EDUCATION/TRAINING PROGRAM

## 2024-06-18 PROCEDURE — 2500000003 HC RX 250 WO HCPCS: Performed by: INTERNAL MEDICINE

## 2024-06-18 PROCEDURE — 84478 ASSAY OF TRIGLYCERIDES: CPT

## 2024-06-18 PROCEDURE — 36415 COLL VENOUS BLD VENIPUNCTURE: CPT

## 2024-06-18 PROCEDURE — 2700000000 HC OXYGEN THERAPY PER DAY

## 2024-06-18 PROCEDURE — 94640 AIRWAY INHALATION TREATMENT: CPT

## 2024-06-18 PROCEDURE — 6370000000 HC RX 637 (ALT 250 FOR IP): Performed by: INTERNAL MEDICINE

## 2024-06-18 PROCEDURE — 2000000000 HC ICU R&B

## 2024-06-18 PROCEDURE — 82962 GLUCOSE BLOOD TEST: CPT

## 2024-06-18 RX ORDER — FUROSEMIDE 10 MG/ML
40 INJECTION INTRAMUSCULAR; INTRAVENOUS EVERY 8 HOURS
Status: DISCONTINUED | OUTPATIENT
Start: 2024-06-18 | End: 2024-06-19

## 2024-06-18 RX ORDER — LABETALOL 100 MG/1
200 TABLET, FILM COATED ORAL EVERY 8 HOURS SCHEDULED
Status: DISCONTINUED | OUTPATIENT
Start: 2024-06-18 | End: 2024-06-19

## 2024-06-18 RX ORDER — AMLODIPINE BESYLATE 5 MG/1
10 TABLET ORAL DAILY
Status: DISCONTINUED | OUTPATIENT
Start: 2024-06-18 | End: 2024-06-29

## 2024-06-18 RX ORDER — QUETIAPINE FUMARATE 25 MG/1
50 TABLET, FILM COATED ORAL 2 TIMES DAILY
Status: DISCONTINUED | OUTPATIENT
Start: 2024-06-18 | End: 2024-06-29 | Stop reason: HOSPADM

## 2024-06-18 RX ADMIN — Medication 1.5 MCG/KG/HR: at 12:47

## 2024-06-18 RX ADMIN — PROPOFOL 30 MCG/KG/MIN: 10 INJECTION, EMULSION INTRAVENOUS at 02:42

## 2024-06-18 RX ADMIN — IPRATROPIUM BROMIDE AND ALBUTEROL SULFATE 1 DOSE: .5; 3 SOLUTION RESPIRATORY (INHALATION) at 00:58

## 2024-06-18 RX ADMIN — FUROSEMIDE 40 MG: 10 INJECTION, SOLUTION INTRAMUSCULAR; INTRAVENOUS at 21:18

## 2024-06-18 RX ADMIN — SERTRALINE 50 MG: 50 TABLET, FILM COATED ORAL at 08:54

## 2024-06-18 RX ADMIN — Medication 1 AMPULE: at 21:17

## 2024-06-18 RX ADMIN — LABETALOL HYDROCHLORIDE 200 MG: 100 TABLET, FILM COATED ORAL at 13:56

## 2024-06-18 RX ADMIN — 0.12% CHLORHEXIDINE GLUCONATE 15 ML: 1.2 RINSE ORAL at 21:18

## 2024-06-18 RX ADMIN — PHENOBARBITAL SODIUM 32.5 MG: 65 INJECTION, SOLUTION INTRAMUSCULAR; INTRAVENOUS at 21:19

## 2024-06-18 RX ADMIN — IPRATROPIUM BROMIDE AND ALBUTEROL SULFATE 1 DOSE: .5; 3 SOLUTION RESPIRATORY (INHALATION) at 07:15

## 2024-06-18 RX ADMIN — WATER 40 MG: 1 INJECTION INTRAMUSCULAR; INTRAVENOUS; SUBCUTANEOUS at 08:54

## 2024-06-18 RX ADMIN — SODIUM CHLORIDE 1000 MG: 900 INJECTION INTRAVENOUS at 00:23

## 2024-06-18 RX ADMIN — ARFORMOTEROL TARTRATE 15 MCG: 15 SOLUTION RESPIRATORY (INHALATION) at 20:15

## 2024-06-18 RX ADMIN — AZITHROMYCIN MONOHYDRATE 500 MG: 500 INJECTION, POWDER, LYOPHILIZED, FOR SOLUTION INTRAVENOUS at 06:40

## 2024-06-18 RX ADMIN — BUDESONIDE 250 MCG: 0.25 INHALANT RESPIRATORY (INHALATION) at 07:22

## 2024-06-18 RX ADMIN — Medication 1 AMPULE: at 08:55

## 2024-06-18 RX ADMIN — FAMOTIDINE 20 MG: 10 INJECTION, SOLUTION INTRAVENOUS at 21:18

## 2024-06-18 RX ADMIN — WATER 40 MG: 1 INJECTION INTRAMUSCULAR; INTRAVENOUS; SUBCUTANEOUS at 21:17

## 2024-06-18 RX ADMIN — BUDESONIDE 250 MCG: 0.25 INHALANT RESPIRATORY (INHALATION) at 20:15

## 2024-06-18 RX ADMIN — WATER 40 MG: 1 INJECTION INTRAMUSCULAR; INTRAVENOUS; SUBCUTANEOUS at 03:02

## 2024-06-18 RX ADMIN — QUETIAPINE FUMARATE 50 MG: 25 TABLET ORAL at 08:54

## 2024-06-18 RX ADMIN — FAMOTIDINE 20 MG: 10 INJECTION, SOLUTION INTRAVENOUS at 08:54

## 2024-06-18 RX ADMIN — QUETIAPINE FUMARATE 50 MG: 25 TABLET ORAL at 21:17

## 2024-06-18 RX ADMIN — IPRATROPIUM BROMIDE AND ALBUTEROL SULFATE 1 DOSE: .5; 3 SOLUTION RESPIRATORY (INHALATION) at 04:16

## 2024-06-18 RX ADMIN — SODIUM CHLORIDE, PRESERVATIVE FREE 10 ML: 5 INJECTION INTRAVENOUS at 08:55

## 2024-06-18 RX ADMIN — FENTANYL CITRATE 25 MCG: 50 INJECTION INTRAMUSCULAR; INTRAVENOUS at 11:39

## 2024-06-18 RX ADMIN — Medication 1.4 MCG/KG/HR: at 07:59

## 2024-06-18 RX ADMIN — IPRATROPIUM BROMIDE AND ALBUTEROL SULFATE 1 DOSE: .5; 3 SOLUTION RESPIRATORY (INHALATION) at 20:15

## 2024-06-18 RX ADMIN — PHENOBARBITAL SODIUM 32.5 MG: 65 INJECTION, SOLUTION INTRAMUSCULAR; INTRAVENOUS at 00:14

## 2024-06-18 RX ADMIN — LABETALOL HYDROCHLORIDE 200 MG: 100 TABLET, FILM COATED ORAL at 21:17

## 2024-06-18 RX ADMIN — CLOPIDOGREL BISULFATE 75 MG: 75 TABLET ORAL at 08:55

## 2024-06-18 RX ADMIN — FOLIC ACID 1 MG: 1 TABLET ORAL at 08:54

## 2024-06-18 RX ADMIN — SODIUM CHLORIDE, PRESERVATIVE FREE 10 ML: 5 INJECTION INTRAVENOUS at 21:17

## 2024-06-18 RX ADMIN — PHENOBARBITAL SODIUM 32.5 MG: 65 INJECTION, SOLUTION INTRAMUSCULAR; INTRAVENOUS at 12:42

## 2024-06-18 RX ADMIN — FUROSEMIDE 40 MG: 10 INJECTION, SOLUTION INTRAMUSCULAR; INTRAVENOUS at 11:48

## 2024-06-18 RX ADMIN — SODIUM CHLORIDE, PRESERVATIVE FREE 10 ML: 5 INJECTION INTRAVENOUS at 21:19

## 2024-06-18 RX ADMIN — ASPIRIN 81 MG: 81 TABLET, CHEWABLE ORAL at 08:54

## 2024-06-18 RX ADMIN — Medication 0.8 MCG/KG/HR: at 18:32

## 2024-06-18 RX ADMIN — PHENOBARBITAL SODIUM 32.5 MG: 65 INJECTION, SOLUTION INTRAMUSCULAR; INTRAVENOUS at 06:37

## 2024-06-18 RX ADMIN — AMLODIPINE BESYLATE 10 MG: 5 TABLET ORAL at 12:03

## 2024-06-18 RX ADMIN — ENOXAPARIN SODIUM 40 MG: 100 INJECTION SUBCUTANEOUS at 08:54

## 2024-06-18 RX ADMIN — IPRATROPIUM BROMIDE AND ALBUTEROL SULFATE 1 DOSE: .5; 3 SOLUTION RESPIRATORY (INHALATION) at 15:15

## 2024-06-18 RX ADMIN — Medication 100 MG: at 21:18

## 2024-06-18 RX ADMIN — ARFORMOTEROL TARTRATE 15 MCG: 15 SOLUTION RESPIRATORY (INHALATION) at 07:22

## 2024-06-18 RX ADMIN — PROPOFOL 10 MCG/KG/MIN: 10 INJECTION, EMULSION INTRAVENOUS at 11:51

## 2024-06-18 RX ADMIN — IPRATROPIUM BROMIDE AND ALBUTEROL SULFATE 1 DOSE: .5; 3 SOLUTION RESPIRATORY (INHALATION) at 11:34

## 2024-06-18 RX ADMIN — 0.12% CHLORHEXIDINE GLUCONATE 15 ML: 1.2 RINSE ORAL at 08:54

## 2024-06-18 RX ADMIN — PROPOFOL 40 MCG/KG/MIN: 10 INJECTION, EMULSION INTRAVENOUS at 21:02

## 2024-06-18 ASSESSMENT — PULMONARY FUNCTION TESTS
PIF_VALUE: 21
PIF_VALUE: 16
PIF_VALUE: 23
PIF_VALUE: 18
PIF_VALUE: 22

## 2024-06-18 NOTE — CARE COORDINATION
Care Management Initial Assessment         RUR: 29    Readmission: No but Pt has had 4 ER visits from 5/17 -6/16 d/t SOB/COPD exacerbation  Inpatient: [facility] was from 4/20 to 4/25: Moderate persistent asthma   [facility] for Dx of NSTEMI    1st IM letter given? N/A - Medicaid coverage  1st  letter given: N/A     Initial note: Chart reviewed. Pt is a 63 y.o female with PMHx of cva x 2, hypertension, and asthma who presented to the ED 6/16 with chief complaint of SOB. Pt is currently one a ventilator and on CIWA precautions.      CM talked to pt's primary emergency contact (cousin - Amaya Freire: [phone]) to verify demographic information.      Per CM initial assessment completed during recent admission (3/19/24 and 4/20/24) and per CG interview pt has been living with her cousin since having a stroke in December of 2023. Cousin's home is 1 level with 3 JENN. PCP is Dr. Star Napier associated with [facility]; date of last visit was May 29, 2024. Pt's cousin previously reported that the she requires assistance with ADL's following residual deficits from stoke in 2023. Pt mostly needs assistance with Bathing and Dressing.  She can usually manage toileting I.  Pt does not drive; relies on cousin for transportation or Medicaid Transportation when needs arise. Pt also has access to transportation benefits via her Medicaid coverage in the event cousin is unavailable. Pt has access to DME in the form of a nebulizer and Home oxygen: BL is 2 LPM via [place] and pt also has access to a cane. CG reports that pt ambulates independently at home.  No prior Hx of HH/SNF/IPR interventions noted in the chart or per CG.      Pt has [facility] Medicaid coverage;   LTSS was completed by outside entity 1/31/24, signed by physician on 2/6/24.   Pt's cousin expressed that they are still working on securing private duty care services Cousin has been in contact with Medicaid Caregiver:  Mom's in

## 2024-06-18 NOTE — PROGRESS NOTES
CRITICAL CARE NOTE      Name: Li Casanova   : 1960   MRN: [id number]   Date: 2024      REASON FOR ICU ADMISSION: Acute hypoxic respiratory failure    PRINCIPAL ICU DIAGNOSIS   Acute hypoxic respiratory failure  COPD exacerbation  Toxic metabolic encephalopathy  Cocaine intoxication/severe agitation    BRIEF PATIENT SUMMARY   62 y/o female PMHx of CAD, HTN, CVA  COPD/asthma (on chronic home 2L) chronic tobacco use disorder and polysubstance abuse use admitted () by hospital medicine for COPD exacerbation after presenting with acute onset of shortness of breath.  CTA chest-negative for PE.  She was started on systemic steroids and bronchodilators.  Per reports she has a history of daily alcohol use as well as history of polysubstance abuse.  Her UDS was positive for cocaine.  Rapid response was called due to tachycardia (140s), worsening tachypnea with RR in the 40s with diffuse wheezing throughout as well as increased agitation,  Discussed with ICU attending and was transferred to the ICU, started on Precedex infusion, phenobarbital taper and diltiazem infusion.     ~2100: HR improved with diltiazem drip, she continued to have restlessness/agitation, worsening encephalopathy, likely escalating her diffuse bronchospasm subsequently had  inability to clear airway secretions and was placed emergently on mechanical ventilation.  Continue systemic steroids and scheduled bronchodilators    : resting comfortably on the ventilator  : deeply sedated    COMPREHENSIVE ASSESSMENT & PLAN:SYSTEM BASED   Acute hypoxic/hypercapnic respiratory failure requiring emergent intubation -current in the setting of COPD/asthma exacerbation, with inability to protect her airway in the setting tox metabolic encephalopathy due to illicit drug ingestion/withdrawal. (Of note small glottic opening, 7.0 ETT in place)  CTA chest-leg PE, emphysema with extensive peripheral scarring and subsegmental atelectasis,

## 2024-06-18 NOTE — PLAN OF CARE
Problem: Discharge Planning  Goal: Discharge to home or other facility with appropriate resources  Recent Flowsheet Documentation  Taken 6/18/2024 0730 by Ayse Whittington RN  Discharge to home or other facility with appropriate resources: Identify barriers to discharge with patient and caregiver  6/18/2024 0601 by Yael Guerrero RN  Outcome: Progressing     Problem: Skin/Tissue Integrity  Goal: Absence of new skin breakdown  Description: 1.  Monitor for areas of redness and/or skin breakdown  2.  Assess vascular access sites hourly  3.  Every 4-6 hours minimum:  Change oxygen saturation probe site  4.  Every 4-6 hours:  If on nasal continuous positive airway pressure, respiratory therapy assess nares and determine need for appliance change or resting period.  6/18/2024 0953 by Ayse Whittington RN  Outcome: Progressing  6/18/2024 0601 by Yael Guerrero RN  Outcome: Progressing     Problem: Safety - Adult  Goal: Free from fall injury  6/18/2024 0953 by Ayse Whittington RN  Outcome: Progressing  6/18/2024 0601 by Yael Guerrero RN  Outcome: Progressing     Problem: Safety - Medical Restraint  Goal: Remains free of injury from restraints (Restraint for Interference with Medical Device)  Description: INTERVENTIONS:  1. Determine that other, less restrictive measures have been tried or would not be effective before applying the restraint  2. Evaluate the patient's condition at the time of restraint application  3. Inform patient/family regarding the reason for restraint  4. Q2H: Monitor safety, psychosocial status, comfort, nutrition and hydration  6/18/2024 0953 by Ayse Whittington RN  Outcome: Progressing  6/18/2024 0636 by Mary Muñoz RN  Outcome: Progressing  6/18/2024 0601 by Yael Guerrero RN  Outcome: Progressing     Problem: Respiratory - Adult  Goal: Achieves optimal ventilation and oxygenation  6/18/2024 0953 by Ayse Whittington RN  Outcome: Progressing  6/18/2024 0601 by Cesar

## 2024-06-18 NOTE — PROGRESS NOTES
0715-Bedside and Verbal shift change report given to CHANDANA Tavera (oncoming nurse) by CHANDANA Conley (offgoing nurse). Report included the following information Nurse Handoff Report, Index, Intake/Output, MAR, Recent Results, Cardiac Rhythm ST, and Neuro Assessment.      0730-Dr. Jacome at bedside and would like to go up on Precedex and down of Propofol in preparation to extubate.  Tube feeds turned off.  Seroquel and Zoloft added to meds.    0908-Patient currently on 1.5mcg/kg/h Precedex and 10mcg/kg/min Propofol.  Upon pulling patient up in bed to reposition, she aggressively started kicking her legs at nursing staff and violently shaking head.  Propofol increased.  Large amount of blood tinged thick secretions suctioned out of ETT.  HR in 140s.      0912-Patient starting to calm from being repositioned in bed.  No longer requiring suctioning and vent is no longer alarming.    1140-Attempted to reposition patient on 1.5mcgs/kg/hr Precedex which resulted in patient getting incredible agitated.  PRN Fentanyl given.  /94, , coughing up large amounts of this red tinged secretions.  4 staff member had to assist in getting her re restrained and positioned in bed.  Dr. Floyd called to bedside and agreed patient is not ready to be extubated and needs Lasix 40mg Q8h for flash pulmonary edema.  Propofol restarted.    1330-Dr. Jacome aware of patient's persistent HTN despite Norvasc being restarted and sedation being increased.  Labetalol ordered.    1905-Bedside and Verbal shift change report given to CHANDANA Titus (oncoming nurse) by CHANDANA Tavera (offgoing nurse). Report included the following information Nurse Handoff Report, Index, Intake/Output, MAR, Recent Results, Cardiac Rhythm NSR, and Neuro Assessment.

## 2024-06-18 NOTE — PROGRESS NOTES
Spiritual Care Assessment/Progress Note  [facility]    Name: Li Casanova MRN: [id number]    Age: 63 y.o.     Sex: female   Language: English     Date: 6/18/2024            Total Time Calculated: 8 min              Spiritual Assessment begun in [facility] CRITICAL CARE  Service Provided For: Patient not available  Referral/Consult From: Rounding  Encounter Overview/Reason: Attempted Encounter    Spiritual beliefs:      [] Involved in a shanda tradition/spiritual practice:      [] Supported by a shanda community:      [] Claims no spiritual orientation:      [] Seeking spiritual identity:           [] Adheres to an individual form of spirituality:      [x] Not able to assess:                Identified resources for coping and support system:   Support System: Family members       [] Prayer                  [] Devotional reading               [] Music                  [] Guided Imagery     [] Pet visits                                        [] Other: (COMMENT)     Specific area/focus of visit   Encounter:    Crisis:    Spiritual/Emotional needs:    Ritual, Rites and Sacraments:    Grief, Loss, and Adjustments:    Ethics/Mediation:    Behavioral Health:    Palliative Care:    Advance Care Planning:      Plan/Referrals: Continue to visit, (comment), Continue Support (comment)    Narrative:  reviewed the patient's chart prior to the visit. The patient appear to be intubated and asleep when the  attempted to visit with her.  prayed silently.    Spiritual Health Services are available 24 hours a day as requested.     Rev. DELVIN Patel  [facility]   Paging Service [phone]

## 2024-06-18 NOTE — INTERDISCIPLINARY ROUNDS
Critical care interdisciplinary rounds today.  Following members present: Case Management, , Clinical Lead, Diabetes Team, Nursing, Nutrition, Pharmacy, and Physician. Family invited to participate.  Plan of care discussed.  See clinical pathway for plan of care and interventions and desired outcomes.    Davis in place for strict I/O's.

## 2024-06-19 LAB
ANION GAP SERPL CALC-SCNC: 7 MMOL/L (ref 5–15)
BUN SERPL-MCNC: 25 MG/DL (ref 6–20)
BUN/CREAT SERPL: 29 (ref 12–20)
CALCIUM SERPL-MCNC: 9.2 MG/DL (ref 8.5–10.1)
CHLORIDE SERPL-SCNC: 105 MMOL/L (ref 97–108)
CO2 SERPL-SCNC: 27 MMOL/L (ref 21–32)
CREAT SERPL-MCNC: 0.86 MG/DL (ref 0.55–1.02)
ERYTHROCYTE [DISTWIDTH] IN BLOOD BY AUTOMATED COUNT: 22.6 % (ref 11.5–14.5)
GLUCOSE BLD STRIP.AUTO-MCNC: 150 MG/DL (ref 65–117)
GLUCOSE BLD STRIP.AUTO-MCNC: 157 MG/DL (ref 65–117)
GLUCOSE BLD STRIP.AUTO-MCNC: 159 MG/DL (ref 65–117)
GLUCOSE BLD STRIP.AUTO-MCNC: 168 MG/DL (ref 65–117)
GLUCOSE SERPL-MCNC: 173 MG/DL (ref 65–100)
HCT VFR BLD AUTO: 28.4 % (ref 35–47)
HGB BLD-MCNC: 8.9 G/DL (ref 11.5–16)
MAGNESIUM SERPL-MCNC: 2.5 MG/DL (ref 1.6–2.4)
MCH RBC QN AUTO: 27 PG (ref 26–34)
MCHC RBC AUTO-ENTMCNC: 31.3 G/DL (ref 30–36.5)
MCV RBC AUTO: 86.1 FL (ref 80–99)
NRBC # BLD: 0.02 K/UL (ref 0–0.01)
NRBC BLD-RTO: 0.1 PER 100 WBC
PHOSPHATE SERPL-MCNC: 5.1 MG/DL (ref 2.6–4.7)
PLATELET # BLD AUTO: 240 K/UL (ref 150–400)
PMV BLD AUTO: 11.7 FL (ref 8.9–12.9)
POTASSIUM SERPL-SCNC: 3.7 MMOL/L (ref 3.5–5.1)
RBC # BLD AUTO: 3.3 M/UL (ref 3.8–5.2)
SERVICE CMNT-IMP: ABNORMAL
SODIUM SERPL-SCNC: 139 MMOL/L (ref 136–145)
WBC # BLD AUTO: 15.1 K/UL (ref 3.6–11)

## 2024-06-19 PROCEDURE — 6370000000 HC RX 637 (ALT 250 FOR IP): Performed by: NURSE PRACTITIONER

## 2024-06-19 PROCEDURE — 80048 BASIC METABOLIC PNL TOTAL CA: CPT

## 2024-06-19 PROCEDURE — 94003 VENT MGMT INPAT SUBQ DAY: CPT

## 2024-06-19 PROCEDURE — 2580000003 HC RX 258: Performed by: INTERNAL MEDICINE

## 2024-06-19 PROCEDURE — 2700000000 HC OXYGEN THERAPY PER DAY

## 2024-06-19 PROCEDURE — 94640 AIRWAY INHALATION TREATMENT: CPT

## 2024-06-19 PROCEDURE — 6370000000 HC RX 637 (ALT 250 FOR IP): Performed by: INTERNAL MEDICINE

## 2024-06-19 PROCEDURE — 2000000000 HC ICU R&B

## 2024-06-19 PROCEDURE — 6360000002 HC RX W HCPCS: Performed by: NURSE PRACTITIONER

## 2024-06-19 PROCEDURE — 36415 COLL VENOUS BLD VENIPUNCTURE: CPT

## 2024-06-19 PROCEDURE — 6360000002 HC RX W HCPCS: Performed by: STUDENT IN AN ORGANIZED HEALTH CARE EDUCATION/TRAINING PROGRAM

## 2024-06-19 PROCEDURE — 83735 ASSAY OF MAGNESIUM: CPT

## 2024-06-19 PROCEDURE — 2580000003 HC RX 258: Performed by: STUDENT IN AN ORGANIZED HEALTH CARE EDUCATION/TRAINING PROGRAM

## 2024-06-19 PROCEDURE — 6360000002 HC RX W HCPCS: Performed by: INTERNAL MEDICINE

## 2024-06-19 PROCEDURE — 82962 GLUCOSE BLOOD TEST: CPT

## 2024-06-19 PROCEDURE — 2500000003 HC RX 250 WO HCPCS: Performed by: INTERNAL MEDICINE

## 2024-06-19 PROCEDURE — 85027 COMPLETE CBC AUTOMATED: CPT

## 2024-06-19 PROCEDURE — 2500000003 HC RX 250 WO HCPCS: Performed by: NURSE PRACTITIONER

## 2024-06-19 PROCEDURE — 2580000003 HC RX 258: Performed by: NURSE PRACTITIONER

## 2024-06-19 PROCEDURE — 84100 ASSAY OF PHOSPHORUS: CPT

## 2024-06-19 RX ORDER — LABETALOL 100 MG/1
100 TABLET, FILM COATED ORAL EVERY 8 HOURS SCHEDULED
Status: DISCONTINUED | OUTPATIENT
Start: 2024-06-19 | End: 2024-06-19

## 2024-06-19 RX ORDER — LANSOPRAZOLE 30 MG/1
30 TABLET, ORALLY DISINTEGRATING, DELAYED RELEASE ORAL DAILY
Status: DISCONTINUED | OUTPATIENT
Start: 2024-06-20 | End: 2024-06-22

## 2024-06-19 RX ORDER — LABETALOL HYDROCHLORIDE 5 MG/ML
10 INJECTION INTRAVENOUS EVERY 4 HOURS PRN
Status: DISCONTINUED | OUTPATIENT
Start: 2024-06-19 | End: 2024-06-29 | Stop reason: HOSPADM

## 2024-06-19 RX ADMIN — SERTRALINE 50 MG: 50 TABLET, FILM COATED ORAL at 08:11

## 2024-06-19 RX ADMIN — 0.12% CHLORHEXIDINE GLUCONATE 15 ML: 1.2 RINSE ORAL at 20:21

## 2024-06-19 RX ADMIN — FUROSEMIDE 40 MG: 10 INJECTION, SOLUTION INTRAMUSCULAR; INTRAVENOUS at 03:51

## 2024-06-19 RX ADMIN — CLOPIDOGREL BISULFATE 75 MG: 75 TABLET ORAL at 08:03

## 2024-06-19 RX ADMIN — AMLODIPINE BESYLATE 10 MG: 5 TABLET ORAL at 08:03

## 2024-06-19 RX ADMIN — IPRATROPIUM BROMIDE AND ALBUTEROL SULFATE 1 DOSE: .5; 3 SOLUTION RESPIRATORY (INHALATION) at 23:45

## 2024-06-19 RX ADMIN — IPRATROPIUM BROMIDE AND ALBUTEROL SULFATE 1 DOSE: .5; 3 SOLUTION RESPIRATORY (INHALATION) at 08:35

## 2024-06-19 RX ADMIN — Medication 1 AMPULE: at 20:20

## 2024-06-19 RX ADMIN — Medication 1 MCG/KG/HR: at 02:49

## 2024-06-19 RX ADMIN — 0.12% CHLORHEXIDINE GLUCONATE 15 ML: 1.2 RINSE ORAL at 08:04

## 2024-06-19 RX ADMIN — QUETIAPINE FUMARATE 50 MG: 25 TABLET ORAL at 08:03

## 2024-06-19 RX ADMIN — Medication 1.2 MCG/KG/HR: at 08:31

## 2024-06-19 RX ADMIN — ASPIRIN 81 MG: 81 TABLET, CHEWABLE ORAL at 08:03

## 2024-06-19 RX ADMIN — SODIUM CHLORIDE 1000 MG: 900 INJECTION INTRAVENOUS at 23:00

## 2024-06-19 RX ADMIN — Medication 100 MG: at 20:22

## 2024-06-19 RX ADMIN — PHENOBARBITAL SODIUM 16.2 MG: 65 INJECTION, SOLUTION INTRAMUSCULAR; INTRAVENOUS at 20:22

## 2024-06-19 RX ADMIN — IPRATROPIUM BROMIDE AND ALBUTEROL SULFATE 1 DOSE: .5; 3 SOLUTION RESPIRATORY (INHALATION) at 15:34

## 2024-06-19 RX ADMIN — PHENOBARBITAL SODIUM 32.5 MG: 65 INJECTION, SOLUTION INTRAMUSCULAR; INTRAVENOUS at 08:03

## 2024-06-19 RX ADMIN — ARFORMOTEROL TARTRATE 15 MCG: 15 SOLUTION RESPIRATORY (INHALATION) at 20:19

## 2024-06-19 RX ADMIN — Medication 1.3 MCG/KG/HR: at 23:37

## 2024-06-19 RX ADMIN — SODIUM CHLORIDE, PRESERVATIVE FREE 10 ML: 5 INJECTION INTRAVENOUS at 20:23

## 2024-06-19 RX ADMIN — QUETIAPINE FUMARATE 50 MG: 25 TABLET ORAL at 20:23

## 2024-06-19 RX ADMIN — PROPOFOL 20 MCG/KG/MIN: 10 INJECTION, EMULSION INTRAVENOUS at 10:46

## 2024-06-19 RX ADMIN — PROPOFOL 40 MCG/KG/MIN: 10 INJECTION, EMULSION INTRAVENOUS at 03:52

## 2024-06-19 RX ADMIN — BUDESONIDE 250 MCG: 0.25 INHALANT RESPIRATORY (INHALATION) at 08:40

## 2024-06-19 RX ADMIN — BUDESONIDE 250 MCG: 0.25 INHALANT RESPIRATORY (INHALATION) at 20:20

## 2024-06-19 RX ADMIN — Medication 1 AMPULE: at 08:03

## 2024-06-19 RX ADMIN — SODIUM CHLORIDE 1000 MG: 900 INJECTION INTRAVENOUS at 00:07

## 2024-06-19 RX ADMIN — Medication 1.3 MCG/KG/HR: at 13:54

## 2024-06-19 RX ADMIN — FAMOTIDINE 20 MG: 10 INJECTION, SOLUTION INTRAVENOUS at 08:04

## 2024-06-19 RX ADMIN — IPRATROPIUM BROMIDE AND ALBUTEROL SULFATE 1 DOSE: .5; 3 SOLUTION RESPIRATORY (INHALATION) at 00:00

## 2024-06-19 RX ADMIN — FAMOTIDINE 20 MG: 10 INJECTION, SOLUTION INTRAVENOUS at 20:21

## 2024-06-19 RX ADMIN — Medication 1.3 MCG/KG/HR: at 18:05

## 2024-06-19 RX ADMIN — IPRATROPIUM BROMIDE AND ALBUTEROL SULFATE 1 DOSE: .5; 3 SOLUTION RESPIRATORY (INHALATION) at 20:19

## 2024-06-19 RX ADMIN — IPRATROPIUM BROMIDE AND ALBUTEROL SULFATE 1 DOSE: .5; 3 SOLUTION RESPIRATORY (INHALATION) at 11:40

## 2024-06-19 RX ADMIN — FOLIC ACID 1 MG: 1 TABLET ORAL at 08:03

## 2024-06-19 RX ADMIN — ENOXAPARIN SODIUM 40 MG: 100 INJECTION SUBCUTANEOUS at 08:04

## 2024-06-19 RX ADMIN — ARFORMOTEROL TARTRATE 15 MCG: 15 SOLUTION RESPIRATORY (INHALATION) at 08:40

## 2024-06-19 RX ADMIN — WATER 40 MG: 1 INJECTION INTRAMUSCULAR; INTRAVENOUS; SUBCUTANEOUS at 20:22

## 2024-06-19 RX ADMIN — WATER 40 MG: 1 INJECTION INTRAMUSCULAR; INTRAVENOUS; SUBCUTANEOUS at 08:03

## 2024-06-19 RX ADMIN — PROPOFOL 40 MCG/KG/MIN: 10 INJECTION, EMULSION INTRAVENOUS at 16:45

## 2024-06-19 RX ADMIN — IPRATROPIUM BROMIDE AND ALBUTEROL SULFATE 1 DOSE: .5; 3 SOLUTION RESPIRATORY (INHALATION) at 04:30

## 2024-06-19 ASSESSMENT — PAIN SCALES - GENERAL
PAINLEVEL_OUTOF10: 0

## 2024-06-19 ASSESSMENT — PULMONARY FUNCTION TESTS
PIF_VALUE: 16
PIF_VALUE: 20
PIF_VALUE: 16
PIF_VALUE: 20
PIF_VALUE: 20
PEFR_L/MIN: 93
PIF_VALUE: 22

## 2024-06-19 NOTE — PROGRESS NOTES
Attempted to turn pt and she became very combative, trying to come out of bed and kicking. Took 3 nurses to hold pt and protect airway. Presidex and prop increased.

## 2024-06-19 NOTE — PROGRESS NOTES
CRITICAL CARE NOTE      Name: Li Casanova   : 1960   MRN: [id number]   Date: 2024      REASON FOR ICU ADMISSION: Acute hypoxic respiratory failure    PRINCIPAL ICU DIAGNOSIS   Acute hypoxic respiratory failure  COPD exacerbation  Toxic metabolic encephalopathy  Cocaine intoxication/severe agitation    BRIEF PATIENT SUMMARY   64 y/o female PMHx of CAD, HTN, CVA  COPD/asthma (on chronic home 2L) chronic tobacco use disorder and polysubstance abuse use admitted () by hospital medicine for COPD exacerbation after presenting with acute onset of shortness of breath.  CTA chest-negative for PE.  She was started on systemic steroids and bronchodilators.  Per reports she has a history of daily alcohol use as well as history of polysubstance abuse.  Her UDS was positive for cocaine.  Rapid response was called due to tachycardia (140s), worsening tachypnea with RR in the 40s with diffuse wheezing throughout as well as increased agitation,  Discussed with ICU attending and was transferred to the ICU, started on Precedex infusion, phenobarbital taper and diltiazem infusion.  Was placed on mechanical ventilation for inability to clear secretions/airway protection on ()      : resting comfortably on the ventilator  : deeply sedated  : Failed SAT/SBT this morning, currently on Precedex 1.2, propofol 40, on phenobarbital taper and started on scheduled Seroquel . Triglycerides 462, re-check in AM, may need to stop propofol Continue to assess mentation for appropriateness for SAT/SBT, if fails cuff leak, will need to consider early trach.    COMPREHENSIVE ASSESSMENT & PLAN:SYSTEM BASED   Acute hypoxic/hypercapnic respiratory failure requiring emergent intubation -current in the setting of COPD/asthma exacerbation, with inability to protect her airway in the setting tox metabolic encephalopathy due to illicit drug ingestion/withdrawal. (Of note small glottic opening, 7.0 ETT in  spent at this critically ill patient's bedside actively involved in patient care as well as the coordination of care.  This does not include any procedural time which has been billed separately.    JESUS Hylton - NP   Critical Care Medicine  [facility]

## 2024-06-19 NOTE — INTERDISCIPLINARY ROUNDS
Critical care interdisciplinary rounds today.  Following members present: Case Management, , Clinical Lead, Diabetes Team, Nursing, Nutrition, Pharmacy, and Physician. Family invited to participate.  Plan of care discussed.  See clinical pathway for plan of care and interventions and desired outcomes.    Davis in place for strict I/O's.     Pt. Remains in Restraints while intubated for patient safety.

## 2024-06-19 NOTE — PROGRESS NOTES
Spiritual Care Assessment/Progress Note  [facility]    Name: Li Casanova MRN: [id number]    Age: 63 y.o.     Sex: female   Language: English     Date: 6/19/2024            Total Time Calculated: 10 min              Spiritual Assessment begun in [facility] CRITICAL CARE  Service Provided For: Patient not available  Referral/Consult From: Rounding  Encounter Overview/Reason: Attempted Encounter    Spiritual beliefs:      [] Involved in a shanda tradition/spiritual practice:      [] Supported by a shanda community:      [] Claims no spiritual orientation:      [] Seeking spiritual identity:           [] Adheres to an individual form of spirituality:      [x] Not able to assess:                Identified resources for coping and support system:   Support System: Unknown       [] Prayer                  [] Devotional reading               [] Music                  [] Guided Imagery     [] Pet visits                                        [] Other: (COMMENT)     Specific area/focus of visit   Encounter:    Crisis:    Spiritual/Emotional needs: Type: Spiritual Support  Ritual, Rites and Sacraments:    Grief, Loss, and Adjustments:    Ethics/Mediation:    Behavioral Health:    Palliative Care:    Advance Care Planning:      Plan/Referrals: Continue to visit, (comment)    Narrative:  on the floor rounding and to visit with the patient. Ms. Casanova was asleep and intubated.  prayed silently.    Spiritual Health Services are available 24 hours a day as requested.     Rev. DELVIN Patel  [facility]   Paging Service [phone]

## 2024-06-19 NOTE — PROGRESS NOTES
attended interdisciplinarr rounds. Doctor discussed patient's treatment.     Chaplain Ginna Intern  [facility]  Paging Service [phone]

## 2024-06-20 ENCOUNTER — APPOINTMENT (OUTPATIENT)
Facility: HOSPITAL | Age: 64
End: 2024-06-20
Payer: MEDICAID

## 2024-06-20 LAB
ANION GAP SERPL CALC-SCNC: 5 MMOL/L (ref 5–15)
BACTERIA SPEC CULT: ABNORMAL
BACTERIA SPEC CULT: ABNORMAL
BUN SERPL-MCNC: 23 MG/DL (ref 6–20)
BUN/CREAT SERPL: 31 (ref 12–20)
CALCIUM SERPL-MCNC: 8.8 MG/DL (ref 8.5–10.1)
CHLORIDE SERPL-SCNC: 106 MMOL/L (ref 97–108)
CO2 SERPL-SCNC: 26 MMOL/L (ref 21–32)
CREAT SERPL-MCNC: 0.74 MG/DL (ref 0.55–1.02)
ERYTHROCYTE [DISTWIDTH] IN BLOOD BY AUTOMATED COUNT: 22.3 % (ref 11.5–14.5)
GLUCOSE BLD STRIP.AUTO-MCNC: 165 MG/DL (ref 65–117)
GLUCOSE BLD STRIP.AUTO-MCNC: 169 MG/DL (ref 65–117)
GLUCOSE BLD STRIP.AUTO-MCNC: 207 MG/DL (ref 65–117)
GLUCOSE SERPL-MCNC: 228 MG/DL (ref 65–100)
GRAM STN SPEC: ABNORMAL
HCT VFR BLD AUTO: 28.1 % (ref 35–47)
HGB BLD-MCNC: 8.6 G/DL (ref 11.5–16)
MAGNESIUM SERPL-MCNC: 2.7 MG/DL (ref 1.6–2.4)
MCH RBC QN AUTO: 26.7 PG (ref 26–34)
MCHC RBC AUTO-ENTMCNC: 30.6 G/DL (ref 30–36.5)
MCV RBC AUTO: 87.3 FL (ref 80–99)
NRBC # BLD: 0.02 K/UL (ref 0–0.01)
NRBC BLD-RTO: 0.2 PER 100 WBC
PHOSPHATE SERPL-MCNC: 3.1 MG/DL (ref 2.6–4.7)
PLATELET # BLD AUTO: 234 K/UL (ref 150–400)
PMV BLD AUTO: 11.4 FL (ref 8.9–12.9)
POTASSIUM SERPL-SCNC: 4.1 MMOL/L (ref 3.5–5.1)
RBC # BLD AUTO: 3.22 M/UL (ref 3.8–5.2)
SERVICE CMNT-IMP: ABNORMAL
SODIUM SERPL-SCNC: 137 MMOL/L (ref 136–145)
TRIGL SERPL-MCNC: 240 MG/DL
WBC # BLD AUTO: 10.9 K/UL (ref 3.6–11)

## 2024-06-20 PROCEDURE — 80048 BASIC METABOLIC PNL TOTAL CA: CPT

## 2024-06-20 PROCEDURE — 2580000003 HC RX 258: Performed by: STUDENT IN AN ORGANIZED HEALTH CARE EDUCATION/TRAINING PROGRAM

## 2024-06-20 PROCEDURE — 2500000003 HC RX 250 WO HCPCS: Performed by: INTERNAL MEDICINE

## 2024-06-20 PROCEDURE — 6370000000 HC RX 637 (ALT 250 FOR IP): Performed by: INTERNAL MEDICINE

## 2024-06-20 PROCEDURE — 84100 ASSAY OF PHOSPHORUS: CPT

## 2024-06-20 PROCEDURE — 6360000002 HC RX W HCPCS: Performed by: INTERNAL MEDICINE

## 2024-06-20 PROCEDURE — 83735 ASSAY OF MAGNESIUM: CPT

## 2024-06-20 PROCEDURE — 94640 AIRWAY INHALATION TREATMENT: CPT

## 2024-06-20 PROCEDURE — 71045 X-RAY EXAM CHEST 1 VIEW: CPT

## 2024-06-20 PROCEDURE — 2580000003 HC RX 258: Performed by: INTERNAL MEDICINE

## 2024-06-20 PROCEDURE — 85027 COMPLETE CBC AUTOMATED: CPT

## 2024-06-20 PROCEDURE — 6360000002 HC RX W HCPCS: Performed by: STUDENT IN AN ORGANIZED HEALTH CARE EDUCATION/TRAINING PROGRAM

## 2024-06-20 PROCEDURE — 6370000000 HC RX 637 (ALT 250 FOR IP): Performed by: NURSE PRACTITIONER

## 2024-06-20 PROCEDURE — 84478 ASSAY OF TRIGLYCERIDES: CPT

## 2024-06-20 PROCEDURE — 82962 GLUCOSE BLOOD TEST: CPT

## 2024-06-20 PROCEDURE — 6360000002 HC RX W HCPCS: Performed by: NURSE PRACTITIONER

## 2024-06-20 PROCEDURE — 94003 VENT MGMT INPAT SUBQ DAY: CPT

## 2024-06-20 PROCEDURE — 2580000003 HC RX 258: Performed by: NURSE PRACTITIONER

## 2024-06-20 PROCEDURE — 36415 COLL VENOUS BLD VENIPUNCTURE: CPT

## 2024-06-20 PROCEDURE — 2000000000 HC ICU R&B

## 2024-06-20 RX ADMIN — Medication 1 AMPULE: at 08:44

## 2024-06-20 RX ADMIN — 0.12% CHLORHEXIDINE GLUCONATE 15 ML: 1.2 RINSE ORAL at 20:35

## 2024-06-20 RX ADMIN — Medication 1.3 MCG/KG/HR: at 16:35

## 2024-06-20 RX ADMIN — SERTRALINE 50 MG: 50 TABLET, FILM COATED ORAL at 08:40

## 2024-06-20 RX ADMIN — Medication 1.3 MCG/KG/HR: at 05:19

## 2024-06-20 RX ADMIN — 0.12% CHLORHEXIDINE GLUCONATE 15 ML: 1.2 RINSE ORAL at 08:44

## 2024-06-20 RX ADMIN — WATER 40 MG: 1 INJECTION INTRAMUSCULAR; INTRAVENOUS; SUBCUTANEOUS at 08:42

## 2024-06-20 RX ADMIN — PROPOFOL 50 MCG/KG/MIN: 10 INJECTION, EMULSION INTRAVENOUS at 20:26

## 2024-06-20 RX ADMIN — IPRATROPIUM BROMIDE AND ALBUTEROL SULFATE 1 DOSE: .5; 3 SOLUTION RESPIRATORY (INHALATION) at 10:58

## 2024-06-20 RX ADMIN — PHENOBARBITAL SODIUM 16.2 MG: 65 INJECTION, SOLUTION INTRAMUSCULAR; INTRAVENOUS at 08:41

## 2024-06-20 RX ADMIN — QUETIAPINE FUMARATE 50 MG: 25 TABLET ORAL at 08:40

## 2024-06-20 RX ADMIN — PROPOFOL 40 MCG/KG/MIN: 10 INJECTION, EMULSION INTRAVENOUS at 00:45

## 2024-06-20 RX ADMIN — BUDESONIDE 250 MCG: 0.25 INHALANT RESPIRATORY (INHALATION) at 19:54

## 2024-06-20 RX ADMIN — LANSOPRAZOLE 30 MG: 30 TABLET, ORALLY DISINTEGRATING, DELAYED RELEASE ORAL at 08:40

## 2024-06-20 RX ADMIN — PROPOFOL 40 MCG/KG/MIN: 10 INJECTION, EMULSION INTRAVENOUS at 13:19

## 2024-06-20 RX ADMIN — SODIUM CHLORIDE, PRESERVATIVE FREE 10 ML: 5 INJECTION INTRAVENOUS at 08:43

## 2024-06-20 RX ADMIN — Medication 100 MG: at 20:36

## 2024-06-20 RX ADMIN — IPRATROPIUM BROMIDE AND ALBUTEROL SULFATE 1 DOSE: .5; 3 SOLUTION RESPIRATORY (INHALATION) at 16:40

## 2024-06-20 RX ADMIN — SODIUM CHLORIDE, PRESERVATIVE FREE 10 ML: 5 INJECTION INTRAVENOUS at 20:35

## 2024-06-20 RX ADMIN — ASPIRIN 81 MG: 81 TABLET, CHEWABLE ORAL at 08:40

## 2024-06-20 RX ADMIN — BUDESONIDE 250 MCG: 0.25 INHALANT RESPIRATORY (INHALATION) at 07:49

## 2024-06-20 RX ADMIN — ARFORMOTEROL TARTRATE 15 MCG: 15 SOLUTION RESPIRATORY (INHALATION) at 07:49

## 2024-06-20 RX ADMIN — SODIUM CHLORIDE, PRESERVATIVE FREE 10 ML: 5 INJECTION INTRAVENOUS at 20:36

## 2024-06-20 RX ADMIN — INSULIN LISPRO 2 UNITS: 100 INJECTION, SOLUTION INTRAVENOUS; SUBCUTANEOUS at 05:25

## 2024-06-20 RX ADMIN — IPRATROPIUM BROMIDE AND ALBUTEROL SULFATE 1 DOSE: .5; 3 SOLUTION RESPIRATORY (INHALATION) at 23:15

## 2024-06-20 RX ADMIN — PROPOFOL 40 MCG/KG/MIN: 10 INJECTION, EMULSION INTRAVENOUS at 06:00

## 2024-06-20 RX ADMIN — SODIUM CHLORIDE, PRESERVATIVE FREE 10 ML: 5 INJECTION INTRAVENOUS at 08:44

## 2024-06-20 RX ADMIN — WATER 40 MG: 1 INJECTION INTRAMUSCULAR; INTRAVENOUS; SUBCUTANEOUS at 20:36

## 2024-06-20 RX ADMIN — Medication 1 AMPULE: at 20:35

## 2024-06-20 RX ADMIN — Medication 1.3 MCG/KG/HR: at 11:10

## 2024-06-20 RX ADMIN — IPRATROPIUM BROMIDE AND ALBUTEROL SULFATE 1 DOSE: .5; 3 SOLUTION RESPIRATORY (INHALATION) at 07:55

## 2024-06-20 RX ADMIN — IPRATROPIUM BROMIDE AND ALBUTEROL SULFATE 1 DOSE: .5; 3 SOLUTION RESPIRATORY (INHALATION) at 03:47

## 2024-06-20 RX ADMIN — ARFORMOTEROL TARTRATE 15 MCG: 15 SOLUTION RESPIRATORY (INHALATION) at 19:54

## 2024-06-20 RX ADMIN — AMLODIPINE BESYLATE 10 MG: 5 TABLET ORAL at 08:40

## 2024-06-20 RX ADMIN — Medication 1.5 MCG/KG/HR: at 20:47

## 2024-06-20 RX ADMIN — CLOPIDOGREL BISULFATE 75 MG: 75 TABLET ORAL at 08:40

## 2024-06-20 RX ADMIN — FOLIC ACID 1 MG: 1 TABLET ORAL at 08:41

## 2024-06-20 RX ADMIN — SODIUM CHLORIDE 1000 MG: 900 INJECTION INTRAVENOUS at 22:59

## 2024-06-20 RX ADMIN — QUETIAPINE FUMARATE 50 MG: 25 TABLET ORAL at 20:35

## 2024-06-20 RX ADMIN — ENOXAPARIN SODIUM 40 MG: 100 INJECTION SUBCUTANEOUS at 08:44

## 2024-06-20 RX ADMIN — IPRATROPIUM BROMIDE AND ALBUTEROL SULFATE 1 DOSE: .5; 3 SOLUTION RESPIRATORY (INHALATION) at 19:54

## 2024-06-20 ASSESSMENT — PULMONARY FUNCTION TESTS
PIF_VALUE: 20
PEFR_L/MIN: 95
PIF_VALUE: 21
PIF_VALUE: 22
PIF_VALUE: 19
PIF_VALUE: 20
PIF_VALUE: 19

## 2024-06-20 ASSESSMENT — PAIN SCALES - GENERAL
PAINLEVEL_OUTOF10: 0
PAINLEVEL_OUTOF10: 0

## 2024-06-20 NOTE — INTERDISCIPLINARY ROUNDS
Critical care interdisciplinary rounds today.  Following members present: Case Management, , Nursing, Nutrition, Pharmacy, and Physician. Davis in for I/O.   Plan of care discussed.  See clinical pathway for plan of care and interventions and desired outcomes.

## 2024-06-20 NOTE — PLAN OF CARE
Problem: Respiratory - Adult  Goal: Achieves optimal ventilation and oxygenation  6/19/2024 2105 by Dian Marie RCP  Outcome: Progressing  6/19/2024 0850 by Sana Maldonado, RT  Outcome: Progressing

## 2024-06-20 NOTE — PROGRESS NOTES
Comprehensive Nutrition Assessment    Type and Reason for Visit:  Reassess    Nutrition Recommendations/Plan:   Continue TF as ordered  Consider diabetes consult if BG remain >200mg/dL     Malnutrition Assessment:  Malnutrition Status:  Insufficient data (06/17/24 1341)        Nutrition Assessment:  Chart reviewed, case discussed during CCU rounds.  Pt remains intubated and sedated with propofol @ 14.5mL/h, which provides 382 kcals daily.  TF at goal with no residuals.  TF + propofol meets 124% kcal needs as PSU is down since the beginning of the week.  If pt remains on this volume of propofol will reduce TF rate in the next day or 2 to prevent excessive feeding.  's, likely 2' steroids.  Lytes WNL.  BM noted today.        Nutrition Related Findings:    Meds: rocephin, folvite, lispro, solumedrol, thiamine, prevacid;   Drips: precedex, propofol.    BM: 6/20   Wound Type: None       Current Nutrition Intake & Therapies:    Average Meal Intake: NPO     Current Tube Feeding (TF) Orders:  Feeding Route: Orogastric  Formula: Standard without Fiber  Schedule: Continuous  Feeding Regimen: 35mL/h  Additives/Modulars: Protein (Prosource daily)  Water Flushes: 100mL q 4h  Current TF & Flush Orders Provides: 1088kcals/66gPro/130gCHO/1288mL  Goal TF & Flush Orders Provides: 1088kcals/66gPro/130gCHO/1288mL    Anthropometric Measures:  Height: 149.9 cm (4' 11\")  Ideal Body Weight (IBW): 95 lbs (43 kg)       Current Body Weight: 60.5 kg (133 lb 6.1 oz), 140.4 % IBW. Weight Source: Bed Scale  Current BMI (kg/m2): 26.9                          BMI Categories: Normal Weight (BMI 22.0 to 24.9) age over 65    Estimated Daily Nutrient Needs:  Energy Requirements Based On: Formula  Weight Used for Energy Requirements: Current  Energy (kcal/day): PSU 1184 (MSJ 1071)  Weight Used for Protein Requirements: Current  Protein (g/day): 60-72g (1-1.2gPro/kg)  Method Used for Fluid Requirements: 1 ml/kcal  Fluid (ml/day):

## 2024-06-20 NOTE — PROGRESS NOTES
Spiritual Care Assessment/Progress Note  [facility]    Name: Li Casanova MRN: [id number]    Age: 63 y.o.     Sex: female   Language: English     Date: 6/20/2024            Total Time Calculated: 10 min              Spiritual Assessment begun in [facility] 2 CRITICAL CARE  Service Provided For: Patient not available  Referral/Consult From: Other   Encounter Overview/Reason: Attempted Encounter    Spiritual beliefs:      [] Involved in a shanda tradition/spiritual practice:      [] Supported by a shanda community:      [] Claims no spiritual orientation:      [] Seeking spiritual identity:           [] Adheres to an individual form of spirituality:      [x] Not able to assess:                Identified resources for coping and support system:   Support System: Friends/neighbors       [] Prayer                  [] Devotional reading               [] Music                  [] Guided Imagery     [] Pet visits                                        [] Other: (COMMENT)     Specific area/focus of visit   Encounter:    Crisis:    Spiritual/Emotional needs: Type: Spiritual Support  Ritual, Rites and Sacraments:    Grief, Loss, and Adjustments:    Ethics/Mediation:    Behavioral Health:    Palliative Care:    Advance Care Planning:      Plan/Referrals: Continue to visit, (comment)    Narrative:  received a referral to visit the patient from  Supervisor Jose. Patient was asleep and intubated.  prayed silently.    Spiritual Health Services are available 24 hours a day as requested.     Rev. DELVIN Patel  [facility]   Paging Service [phone])

## 2024-06-20 NOTE — PROGRESS NOTES
attended interdisciplinary rounds. Pt medical status was discussed.    Yuval Ross, PhD,  Intern  Spiritual Health Services  Paging Service: [phone] (TRENA)

## 2024-06-20 NOTE — CARE COORDINATION
Transition of Care Plan:    RUR: 27%  Prior Level of Functioning: Assistance with adl's  Disposition: Home with family and hh vs snf  If SNF or IPR: Date FOC offered: N/A at this time  Date FOC received: N/A at this time  Accepting facility: N/A  Date authorization started with reference number: N/A at this time.   Date authorization received and expires: N/A at this time.   Follow up appointments: To be done prior to discharge.   DME needed: None  Transportation at discharge: Family  IM/IMM Medicare/ letter given: N/A  Is patient a [place] and connected with [facility]? No   If yes, was  transfer form completed and VA notified? N/A  Caregiver Contact: Cousin  Discharge Caregiver contacted prior to discharge? Caregiver to be contacted prior to discharge.   Care Conference needed? No  Barriers to discharge: Medical stability      Patient continues to receive care in ccu. She remains intubated. Will continue to monitor.      Leeann Ruelas RN BSN CRM        [phone]

## 2024-06-20 NOTE — PROGRESS NOTES
Bedside shift change report given to Marcella RN (oncoming nurse) by Lloyd RN (offgoing nurse). Report included the following information Nurse Handoff Report, Adult Overview, Intake/Output, MAR, and Recent Results.     0800 Shift assessment complete, RASS -4/+2. Prop @40 Dex @1.3. Patient becomes easily agitated when turning/bathing. Copious amounts of thick tan oral secretions noted    1130 Incontinence care performed for BM    1200 Reassessment complete, no change    1246 Patient bathed and incontinence care performed for BM    1600 Reassessment complete, no change    Bedside shift change report given to Lloyd RN (oncoming nurse) by Marcella RN (offgoing nurse). Report included the following information Nurse Handoff Report, Adult Overview, Intake/Output, MAR, and Recent Results.

## 2024-06-20 NOTE — PROGRESS NOTES
CRITICAL CARE NOTE      Name: Li Casanova   : 1960   MRN: [id number]   Date: 2024      REASON FOR ICU ADMISSION: Acute hypoxic respiratory failure    PRINCIPAL ICU DIAGNOSIS     Acute hypoxic respiratory failure.  COPD exacerbation.  Toxic metabolic encephalopathy.  Cocaine intoxication/severe agitation.    BRIEF PATIENT SUMMARY   62 y/o female PMHx of CAD, HTN, CVA  COPD/asthma (on chronic home 2L) chronic tobacco use disorder and polysubstance abuse use admitted () by hospital medicine for COPD exacerbation after presenting with acute onset of shortness of breath.  CTA chest-negative for PE.  She was started on systemic steroids and bronchodilators.  Per reports she has a history of daily alcohol use as well as history of polysubstance abuse.  Her UDS was positive for cocaine.  Rapid response was called due to tachycardia (140s), worsening tachypnea with RR in the 40s with diffuse wheezing throughout as well as increased agitation,  Discussed with ICU attending and was transferred to the ICU, started on Precedex infusion, phenobarbital taper and diltiazem infusion.  Was placed on mechanical ventilation for inability to clear secretions/airway protection on ()      : resting comfortably on the ventilator  : deeply sedated  : Failed SAT/SBT this morning, currently on Precedex 1.2, propofol 40, on phenobarbital taper and started on scheduled Seroquel . Triglycerides 462, re-check in AM, may need to stop propofol Continue to assess mentation for appropriateness for SAT/SBT, if fails cuff leak, will need to consider early trach.    COMPREHENSIVE ASSESSMENT & PLAN:SYSTEM BASED     Acute hypoxic/hypercapnic respiratory failure requiring emergent intubation -current in the setting of COPD/asthma exacerbation, with inability to protect her airway in the setting tox metabolic encephalopathy due to illicit drug ingestion/withdrawal. (Of note small glottic opening, 7.0 ETT in

## 2024-06-21 LAB
ANION GAP SERPL CALC-SCNC: 3 MMOL/L (ref 5–15)
ARTERIAL PATENCY WRIST A: YES
BASE EXCESS BLDA CALC-SCNC: 3.7 MMOL/L
BDY SITE: ABNORMAL
BUN SERPL-MCNC: 20 MG/DL (ref 6–20)
BUN/CREAT SERPL: 29 (ref 12–20)
CALCIUM SERPL-MCNC: 9.2 MG/DL (ref 8.5–10.1)
CHLORIDE SERPL-SCNC: 106 MMOL/L (ref 97–108)
CO2 SERPL-SCNC: 27 MMOL/L (ref 21–32)
CREAT SERPL-MCNC: 0.68 MG/DL (ref 0.55–1.02)
ERYTHROCYTE [DISTWIDTH] IN BLOOD BY AUTOMATED COUNT: 22.3 % (ref 11.5–14.5)
GLUCOSE BLD STRIP.AUTO-MCNC: 110 MG/DL (ref 65–117)
GLUCOSE BLD STRIP.AUTO-MCNC: 119 MG/DL (ref 65–117)
GLUCOSE BLD STRIP.AUTO-MCNC: 128 MG/DL (ref 65–117)
GLUCOSE BLD STRIP.AUTO-MCNC: 181 MG/DL (ref 65–117)
GLUCOSE BLD STRIP.AUTO-MCNC: 182 MG/DL (ref 65–117)
GLUCOSE BLD STRIP.AUTO-MCNC: 198 MG/DL (ref 65–117)
GLUCOSE SERPL-MCNC: 177 MG/DL (ref 65–100)
HCO3 BLDA-SCNC: 28 MMOL/L (ref 22–26)
HCT VFR BLD AUTO: 26.6 % (ref 35–47)
HGB BLD-MCNC: 8.1 G/DL (ref 11.5–16)
MAGNESIUM SERPL-MCNC: 2.7 MG/DL (ref 1.6–2.4)
MCH RBC QN AUTO: 26.3 PG (ref 26–34)
MCHC RBC AUTO-ENTMCNC: 30.5 G/DL (ref 30–36.5)
MCV RBC AUTO: 86.4 FL (ref 80–99)
NRBC # BLD: 0 K/UL (ref 0–0.01)
NRBC BLD-RTO: 0 PER 100 WBC
PCO2 BLDA: 40 MMHG (ref 35–45)
PH BLDA: 7.46 (ref 7.35–7.45)
PHOSPHATE SERPL-MCNC: 3.6 MG/DL (ref 2.6–4.7)
PLATELET # BLD AUTO: 245 K/UL (ref 150–400)
PMV BLD AUTO: 11.4 FL (ref 8.9–12.9)
PO2 BLDA: 65 MMHG (ref 80–100)
POTASSIUM SERPL-SCNC: 4.5 MMOL/L (ref 3.5–5.1)
RBC # BLD AUTO: 3.08 M/UL (ref 3.8–5.2)
SAO2 % BLD: 94 % (ref 92–97)
SAO2% DEVICE SAO2% SENSOR NAME: ABNORMAL
SERVICE CMNT-IMP: ABNORMAL
SERVICE CMNT-IMP: NORMAL
SODIUM SERPL-SCNC: 136 MMOL/L (ref 136–145)
SPECIMEN SITE: ABNORMAL
WBC # BLD AUTO: 11.3 K/UL (ref 3.6–11)

## 2024-06-21 PROCEDURE — 85027 COMPLETE CBC AUTOMATED: CPT

## 2024-06-21 PROCEDURE — 82962 GLUCOSE BLOOD TEST: CPT

## 2024-06-21 PROCEDURE — 2000000000 HC ICU R&B

## 2024-06-21 PROCEDURE — 2700000000 HC OXYGEN THERAPY PER DAY

## 2024-06-21 PROCEDURE — 6360000002 HC RX W HCPCS: Performed by: STUDENT IN AN ORGANIZED HEALTH CARE EDUCATION/TRAINING PROGRAM

## 2024-06-21 PROCEDURE — 6360000002 HC RX W HCPCS: Performed by: NURSE PRACTITIONER

## 2024-06-21 PROCEDURE — 6360000002 HC RX W HCPCS: Performed by: INTERNAL MEDICINE

## 2024-06-21 PROCEDURE — 82803 BLOOD GASES ANY COMBINATION: CPT

## 2024-06-21 PROCEDURE — 2500000003 HC RX 250 WO HCPCS: Performed by: INTERNAL MEDICINE

## 2024-06-21 PROCEDURE — 2580000003 HC RX 258: Performed by: STUDENT IN AN ORGANIZED HEALTH CARE EDUCATION/TRAINING PROGRAM

## 2024-06-21 PROCEDURE — 36600 WITHDRAWAL OF ARTERIAL BLOOD: CPT

## 2024-06-21 PROCEDURE — 94003 VENT MGMT INPAT SUBQ DAY: CPT

## 2024-06-21 PROCEDURE — 36415 COLL VENOUS BLD VENIPUNCTURE: CPT

## 2024-06-21 PROCEDURE — 2580000003 HC RX 258: Performed by: INTERNAL MEDICINE

## 2024-06-21 PROCEDURE — 84100 ASSAY OF PHOSPHORUS: CPT

## 2024-06-21 PROCEDURE — 6370000000 HC RX 637 (ALT 250 FOR IP): Performed by: NURSE PRACTITIONER

## 2024-06-21 PROCEDURE — 94640 AIRWAY INHALATION TREATMENT: CPT

## 2024-06-21 PROCEDURE — 6370000000 HC RX 637 (ALT 250 FOR IP): Performed by: INTERNAL MEDICINE

## 2024-06-21 PROCEDURE — 83735 ASSAY OF MAGNESIUM: CPT

## 2024-06-21 PROCEDURE — 80048 BASIC METABOLIC PNL TOTAL CA: CPT

## 2024-06-21 RX ORDER — METOCLOPRAMIDE HYDROCHLORIDE 5 MG/ML
10 INJECTION INTRAMUSCULAR; INTRAVENOUS ONCE
Status: COMPLETED | OUTPATIENT
Start: 2024-06-21 | End: 2024-06-21

## 2024-06-21 RX ADMIN — PROPOFOL 50 MCG/KG/MIN: 10 INJECTION, EMULSION INTRAVENOUS at 05:05

## 2024-06-21 RX ADMIN — ENOXAPARIN SODIUM 40 MG: 100 INJECTION SUBCUTANEOUS at 08:25

## 2024-06-21 RX ADMIN — SODIUM CHLORIDE, PRESERVATIVE FREE 10 ML: 5 INJECTION INTRAVENOUS at 21:56

## 2024-06-21 RX ADMIN — IPRATROPIUM BROMIDE AND ALBUTEROL SULFATE 1 DOSE: .5; 3 SOLUTION RESPIRATORY (INHALATION) at 13:14

## 2024-06-21 RX ADMIN — ONDANSETRON 4 MG: 2 INJECTION INTRAMUSCULAR; INTRAVENOUS at 22:01

## 2024-06-21 RX ADMIN — ASPIRIN 81 MG: 81 TABLET, CHEWABLE ORAL at 08:32

## 2024-06-21 RX ADMIN — FOLIC ACID 1 MG: 1 TABLET ORAL at 08:33

## 2024-06-21 RX ADMIN — QUETIAPINE FUMARATE 50 MG: 25 TABLET ORAL at 21:55

## 2024-06-21 RX ADMIN — 0.12% CHLORHEXIDINE GLUCONATE 15 ML: 1.2 RINSE ORAL at 08:27

## 2024-06-21 RX ADMIN — IPRATROPIUM BROMIDE AND ALBUTEROL SULFATE 1 DOSE: .5; 3 SOLUTION RESPIRATORY (INHALATION) at 08:01

## 2024-06-21 RX ADMIN — CLOPIDOGREL BISULFATE 75 MG: 75 TABLET ORAL at 08:32

## 2024-06-21 RX ADMIN — Medication 1 AMPULE: at 22:04

## 2024-06-21 RX ADMIN — METOCLOPRAMIDE 10 MG: 5 INJECTION, SOLUTION INTRAMUSCULAR; INTRAVENOUS at 20:29

## 2024-06-21 RX ADMIN — ARFORMOTEROL TARTRATE 15 MCG: 15 SOLUTION RESPIRATORY (INHALATION) at 08:01

## 2024-06-21 RX ADMIN — FENTANYL CITRATE 25 MCG: 50 INJECTION INTRAMUSCULAR; INTRAVENOUS at 22:10

## 2024-06-21 RX ADMIN — SODIUM CHLORIDE, PRESERVATIVE FREE 10 ML: 5 INJECTION INTRAVENOUS at 22:06

## 2024-06-21 RX ADMIN — Medication 1.5 MCG/KG/HR: at 07:21

## 2024-06-21 RX ADMIN — AMLODIPINE BESYLATE 10 MG: 5 TABLET ORAL at 08:32

## 2024-06-21 RX ADMIN — Medication 1 AMPULE: at 08:27

## 2024-06-21 RX ADMIN — QUETIAPINE FUMARATE 50 MG: 25 TABLET ORAL at 08:33

## 2024-06-21 RX ADMIN — BUDESONIDE 250 MCG: 0.25 INHALANT RESPIRATORY (INHALATION) at 19:19

## 2024-06-21 RX ADMIN — SERTRALINE 50 MG: 50 TABLET, FILM COATED ORAL at 08:32

## 2024-06-21 RX ADMIN — IPRATROPIUM BROMIDE AND ALBUTEROL SULFATE 1 DOSE: .5; 3 SOLUTION RESPIRATORY (INHALATION) at 23:06

## 2024-06-21 RX ADMIN — BUDESONIDE 250 MCG: 0.25 INHALANT RESPIRATORY (INHALATION) at 08:01

## 2024-06-21 RX ADMIN — SODIUM CHLORIDE, PRESERVATIVE FREE 10 ML: 5 INJECTION INTRAVENOUS at 08:33

## 2024-06-21 RX ADMIN — PROPOFOL 50 MCG/KG/MIN: 10 INJECTION, EMULSION INTRAVENOUS at 00:05

## 2024-06-21 RX ADMIN — ONDANSETRON 4 MG: 2 INJECTION INTRAMUSCULAR; INTRAVENOUS at 17:01

## 2024-06-21 RX ADMIN — 0.12% CHLORHEXIDINE GLUCONATE 15 ML: 1.2 RINSE ORAL at 21:56

## 2024-06-21 RX ADMIN — Medication 100 MG: at 21:55

## 2024-06-21 RX ADMIN — IPRATROPIUM BROMIDE AND ALBUTEROL SULFATE 1 DOSE: .5; 3 SOLUTION RESPIRATORY (INHALATION) at 16:18

## 2024-06-21 RX ADMIN — IPRATROPIUM BROMIDE AND ALBUTEROL SULFATE 1 DOSE: .5; 3 SOLUTION RESPIRATORY (INHALATION) at 19:19

## 2024-06-21 RX ADMIN — LABETALOL HYDROCHLORIDE 10 MG: 5 INJECTION INTRAVENOUS at 23:07

## 2024-06-21 RX ADMIN — PROPOFOL 30 MCG/KG/MIN: 10 INJECTION, EMULSION INTRAVENOUS at 11:49

## 2024-06-21 RX ADMIN — Medication 1.5 MCG/KG/HR: at 03:01

## 2024-06-21 RX ADMIN — SODIUM CHLORIDE, PRESERVATIVE FREE 10 ML: 5 INJECTION INTRAVENOUS at 08:31

## 2024-06-21 RX ADMIN — LANSOPRAZOLE 30 MG: 30 TABLET, ORALLY DISINTEGRATING, DELAYED RELEASE ORAL at 08:33

## 2024-06-21 RX ADMIN — IPRATROPIUM BROMIDE AND ALBUTEROL SULFATE 1 DOSE: .5; 3 SOLUTION RESPIRATORY (INHALATION) at 03:02

## 2024-06-21 RX ADMIN — WATER 40 MG: 1 INJECTION INTRAMUSCULAR; INTRAVENOUS; SUBCUTANEOUS at 08:33

## 2024-06-21 RX ADMIN — ARFORMOTEROL TARTRATE 15 MCG: 15 SOLUTION RESPIRATORY (INHALATION) at 19:19

## 2024-06-21 RX ADMIN — Medication 1.5 MCG/KG/HR: at 12:27

## 2024-06-21 ASSESSMENT — PAIN SCALES - GENERAL
PAINLEVEL_OUTOF10: 1
PAINLEVEL_OUTOF10: 0
PAINLEVEL_OUTOF10: 1
PAINLEVEL_OUTOF10: 8

## 2024-06-21 ASSESSMENT — PAIN DESCRIPTION - DESCRIPTORS: DESCRIPTORS: ACHING;STABBING;SQUEEZING

## 2024-06-21 ASSESSMENT — PULMONARY FUNCTION TESTS
PIF_VALUE: 11
PIF_VALUE: 20
PIF_VALUE: 19

## 2024-06-21 ASSESSMENT — PAIN DESCRIPTION - LOCATION: LOCATION: HEAD

## 2024-06-21 NOTE — PLAN OF CARE
Problem: Respiratory - Adult  Goal: Achieves optimal ventilation and oxygenation  6/21/2024 1923 by Dian Marie RCP  Outcome: Progressing  6/21/2024 0807 by Magda Gonzalez RCP  Outcome: Progressing  Flowsheets (Taken 6/21/2024 0800 by Jamaal Clark, RN)  Achieves optimal ventilation and oxygenation:   Assess for changes in respiratory status   Assess for changes in mentation and behavior   Position to facilitate oxygenation and minimize respiratory effort

## 2024-06-21 NOTE — PROGRESS NOTES
ADULT PROTOCOL: JET AEROSOL ASSESSMENT    Patient  Li Casanova     63 y.o.   female     6/21/2024  4:44 PM    Breath Sounds Pre Procedure: Breath Sounds Pre-Tx VANESSA: Scattered wheezes                                  Breath Sounds Pre-Tx LLL: Scattered wheezes        Breath Sounds Pre-Tx RUL: Scattered wheezes        Breath Sounds Pre-Tx RML: Scattered wheezes        Breath Sounds Pre-Tx RLL: Scattered wheezes  Breath Sounds Post Procedure: Breath Sounds Post-Tx VANESSA: Scattered wheezes          Breath Sounds Post-Tx LLL: Scattered wheezes          Breath Sounds Post-Tx RUL: Scattered wheezes          Breath Sounds Post-Tx RML: Scattered wheezes          Breath Sounds Post-Tx RLL: Scattered wheezes                                     Heart Rate: Pre procedure Pre-Tx Pulse: 103           Post procedure Post-Tx Pulse: 78    Resp Rate: Pre procedure Pre-Tx Resps: 25           Post procedure Post-Tx Resps: 20    Peak Flow: Pre bronchodilator  Peak Flow: 95          Post bronchodilator       Oxygen: O2 Therapy: Oxygen humidified   nasal cannula     Changed: Yes    SpO2:  SpO2: 90 %   with Oxygen                Nebulizer Therapy: Current medications Medications: Albuterol/Ipratropium      Changed: No    Comments: Patient extubated at 1550 to 4L NC.         Problem List:   Patient Active Problem List   Diagnosis    NSTEMI (non-ST elevated myocardial infarction) ([facility])    COPD (chronic obstructive pulmonary disease) ([facility])    Acute exacerbation of chronic obstructive pulmonary disease (COPD) ([facility])       Respiratory Therapist: Magda Gonzalez RCP

## 2024-06-21 NOTE — PROGRESS NOTES
0700  Report received from Lloyd ELLINGTON    0800  Assessment complete  Patient sedated and being ventilated on ventilator  a/c v/c  45%  peep 5 rate 10 large amount of thick tan secretions from ETT and orally  Restraints in place no command following does withdraw from pain does have a cough and gag Davis in place OGT in place with Osmolite 35 at goal and water flushes 100 every 4 3 PIV in place  Propofol 50 mcg and Precedex 1.5 mcg  Left pupil blood shot    0820  Dr Wheeler into see patient will wean sedation for SAT and SBT    [id number]  Weaned Propofol see MAR    1200  Assessment complete awake alert eyes open spontaneously able to follow commands still has oral and ETT secretions but only suctioning occasionally     1305  BM cleaned    1312  Propofol stopped for SAT/SBT    1314  Patient following commands SBT 5/5 45% by Respiratory  Dr Wheeler in room will do this for a good hour and reassess    1530  Spoke with Dr Wheeler will do ABG and decide on extubation    1550  Dr Wheeler in room patient extubated after vomiting     1600  Assessment complete  Alert oriented to self able to follow commands able to use Yankauer suction on own      1700  Vomiting again Zofran given see MAR    1750  Attempted to get out of bed due to vomiting   able to redirect    1830  Vomited again more like phlegm this time  HR does go up as high as 150 then settles back down low 100's    1900  Report given to Devin ELLINGTON

## 2024-06-21 NOTE — PROGRESS NOTES
Zofran 4 mg given IV for nausea and vomiting. Amaya Freire called per request of patient and made aware she is off of Life Support. Stated she would come up and see her tomorrow.

## 2024-06-21 NOTE — PLAN OF CARE
Problem: Safety - Medical Restraint  Goal: Remains free of injury from restraints (Restraint for Interference with Medical Device)  Description: INTERVENTIONS:  1. Determine that other, less restrictive measures have been tried or would not be effective before applying the restraint  2. Evaluate the patient's condition at the time of restraint application  3. Inform patient/family regarding the reason for restraint  4. Q2H: Monitor safety, psychosocial status, comfort, nutrition and hydration  Outcome: Progressing     Problem: Pain  Goal: Verbalizes/displays adequate comfort level or baseline comfort level  Outcome: Progressing

## 2024-06-21 NOTE — PROGRESS NOTES
06/21/24 0537   B: Both Spontaneous Awakening and Breathing Trials   Did Patient Receive Sedative and/or Opioid IV Medications in the Last 24 Hours Continuously infused meds for sedation   Safety Screening Spontaneous Awakening Trial (SAT)   (RN said no wean for this patient this morning)   Was Patient Receiving Mechanical Ventilation Yes

## 2024-06-21 NOTE — PROGRESS NOTES
Spiritual Care Assessment/Progress Note  [facility]    Name: Li Casanova MRN: [id number]    Age: 63 y.o.     Sex: female   Language: English     Date: 6/21/2024            Total Time Calculated: 15 min              Spiritual Assessment begun in [facility] 2 CRITICAL CARE  Service Provided For: Patient  Referral/Consult From: Rounding  Encounter Overview/Reason: Attempted Encounter    Spiritual beliefs:      [x] Involved in a shanda tradition/spiritual practice:      [] Supported by a shanda community:      [] Claims no spiritual orientation:      [] Seeking spiritual identity:           [] Adheres to an individual form of spirituality:      [] Not able to assess:                Identified resources for coping and support system:   Support System: Friends/neighbors       [x] Prayer                  [] Devotional reading               [] Music                  [] Guided Imagery     [] Pet visits                                        [] Other: (COMMENT)     Specific area/focus of visit   Encounter:    Crisis:    Spiritual/Emotional needs: Type: Spiritual Support  Ritual, Rites and Sacraments:    Grief, Loss, and Adjustments:    Ethics/Mediation:    Behavioral Health:    Palliative Care:    Advance Care Planning:      Plan/Referrals: Continue to visit, (comment)    Narrative:  on the floor rounding and to visit with Ms. Casanova.  was praying silently while she is sleep/intubated. She opened her eyes and nodded her head.  advised her of my purpose for visiting and asked her to blink her eyes if she would like prayer. She nodded her head and blinked her eyes.  prayed with her and encouraged her. Her nurse entered the room and the  advised him to the status of the visit.     Spiritual Health Services are available 24 hours a day as requested.     Rev. COLLETTE Patel. Deshaun  [facility]   Paging Service [phone]

## 2024-06-21 NOTE — INTERDISCIPLINARY ROUNDS
Critical care interdisciplinary rounds today.  Following members present: Case Management, Nursing, Nutrition, Pharmacy, and Physician. Ryan noted for I/O.  Plan of care discussed.  See clinical pathway for plan of care and interventions and desired outcomes.

## 2024-06-21 NOTE — PROGRESS NOTES
CRITICAL CARE NOTE      Name: Li Casanova   : 1960   MRN: [id number]   Date: 2024      REASON FOR ICU ADMISSION: Acute hypoxic respiratory failure    PRINCIPAL ICU DIAGNOSIS     Acute hypoxic respiratory failure.  COPD exacerbation.  Toxic metabolic encephalopathy.  Cocaine intoxication/severe agitation.    BRIEF PATIENT SUMMARY   62 y/o female PMHx of CAD, HTN, CVA  COPD/asthma (on chronic home 2L) chronic tobacco use disorder and polysubstance abuse use admitted () by hospital medicine for COPD exacerbation after presenting with acute onset of shortness of breath.  CTA chest-negative for PE.  She was started on systemic steroids and bronchodilators.  Per reports she has a history of daily alcohol use as well as history of polysubstance abuse.  Her UDS was positive for cocaine.  Rapid response was called due to tachycardia (140s), worsening tachypnea with RR in the 40s with diffuse wheezing throughout as well as increased agitation,  Discussed with ICU attending and was transferred to the ICU, started on Precedex infusion, phenobarbital taper and diltiazem infusion.  Was placed on mechanical ventilation for inability to clear secretions/airway protection on ()      : resting comfortably on the ventilator  : deeply sedated  : Failed SAT/SBT this morning, currently on Precedex 1.2, propofol 40, on phenobarbital taper and started on scheduled Seroquel . Triglycerides 462, re-check in AM, may need to stop propofol Continue to assess mentation for appropriateness for SAT/SBT, if fails cuff leak, will need to consider early trach.  : Patient remains intubated and sedated. Intermittently very agitated when sedation weaning is attempted, has copious oral secretions.     COMPREHENSIVE ASSESSMENT & PLAN:SYSTEM BASED     Acute hypoxic/hypercapnic respiratory failure requiring emergent intubation -current in the setting of COPD/asthma exacerbation, with inability to protect

## 2024-06-21 NOTE — PROGRESS NOTES
1900: Report received at bedside from CHANDANA Hinton in SBAR format with all questions and concerns addressed.    2000: Pt assessment completed with findings documented, pt repositioned.    2029: Pt having several episode of vomiting, consulted ALIYA King new orders for Reglan placed.    2201: Episode of vomiting, gave Zofran (see MAR)    2210: Pt complaining of severe headache gave Fentanyl (see MAR)    2307: SBP sustained >160 gave labetalol (see MAR).    0000: Reassessed pt, findings documented, pt repositioned.     0200: Rounded on pt resting quietly in bed, pt repositioned    0400: Reassessed pt, findings documented, pt repositioned.     0600: Rounded on pt resting quietly in bed, pt repositioned.    0640: Pt up to chair, with alarm set.     0700: Report given at bedside to CHANDANA Lemus in SBAR format with all questions and concerns addressed.

## 2024-06-22 ENCOUNTER — APPOINTMENT (OUTPATIENT)
Facility: HOSPITAL | Age: 64
End: 2024-06-22
Payer: MEDICAID

## 2024-06-22 LAB
ANION GAP SERPL CALC-SCNC: 4 MMOL/L (ref 5–15)
BUN SERPL-MCNC: 24 MG/DL (ref 6–20)
BUN/CREAT SERPL: 34 (ref 12–20)
CALCIUM SERPL-MCNC: 10.2 MG/DL (ref 8.5–10.1)
CHLORIDE SERPL-SCNC: 105 MMOL/L (ref 97–108)
CO2 SERPL-SCNC: 29 MMOL/L (ref 21–32)
CREAT SERPL-MCNC: 0.71 MG/DL (ref 0.55–1.02)
ERYTHROCYTE [DISTWIDTH] IN BLOOD BY AUTOMATED COUNT: 21.6 % (ref 11.5–14.5)
GLUCOSE BLD STRIP.AUTO-MCNC: 100 MG/DL (ref 65–117)
GLUCOSE BLD STRIP.AUTO-MCNC: 117 MG/DL (ref 65–117)
GLUCOSE BLD STRIP.AUTO-MCNC: 132 MG/DL (ref 65–117)
GLUCOSE BLD STRIP.AUTO-MCNC: 84 MG/DL (ref 65–117)
GLUCOSE SERPL-MCNC: 93 MG/DL (ref 65–100)
HCT VFR BLD AUTO: 28.6 % (ref 35–47)
HGB BLD-MCNC: 8.6 G/DL (ref 11.5–16)
MAGNESIUM SERPL-MCNC: 2.6 MG/DL (ref 1.6–2.4)
MCH RBC QN AUTO: 26.4 PG (ref 26–34)
MCHC RBC AUTO-ENTMCNC: 30.1 G/DL (ref 30–36.5)
MCV RBC AUTO: 87.7 FL (ref 80–99)
NRBC # BLD: 0.03 K/UL (ref 0–0.01)
NRBC BLD-RTO: 0.2 PER 100 WBC
PHOSPHATE SERPL-MCNC: 3.4 MG/DL (ref 2.6–4.7)
PLATELET # BLD AUTO: 308 K/UL (ref 150–400)
PMV BLD AUTO: 11.7 FL (ref 8.9–12.9)
POTASSIUM SERPL-SCNC: 3.8 MMOL/L (ref 3.5–5.1)
PROCALCITONIN SERPL-MCNC: <0.05 NG/ML
RBC # BLD AUTO: 3.26 M/UL (ref 3.8–5.2)
SERVICE CMNT-IMP: ABNORMAL
SERVICE CMNT-IMP: NORMAL
SODIUM SERPL-SCNC: 138 MMOL/L (ref 136–145)
WBC # BLD AUTO: 16.3 K/UL (ref 3.6–11)

## 2024-06-22 PROCEDURE — 51798 US URINE CAPACITY MEASURE: CPT

## 2024-06-22 PROCEDURE — 6370000000 HC RX 637 (ALT 250 FOR IP): Performed by: HOSPITALIST

## 2024-06-22 PROCEDURE — 2580000003 HC RX 258: Performed by: HOSPITALIST

## 2024-06-22 PROCEDURE — 83735 ASSAY OF MAGNESIUM: CPT

## 2024-06-22 PROCEDURE — 51701 INSERT BLADDER CATHETER: CPT

## 2024-06-22 PROCEDURE — 2580000003 HC RX 258: Performed by: STUDENT IN AN ORGANIZED HEALTH CARE EDUCATION/TRAINING PROGRAM

## 2024-06-22 PROCEDURE — 6370000000 HC RX 637 (ALT 250 FOR IP): Performed by: NURSE PRACTITIONER

## 2024-06-22 PROCEDURE — 6360000002 HC RX W HCPCS: Performed by: HOSPITALIST

## 2024-06-22 PROCEDURE — 80048 BASIC METABOLIC PNL TOTAL CA: CPT

## 2024-06-22 PROCEDURE — 2700000000 HC OXYGEN THERAPY PER DAY

## 2024-06-22 PROCEDURE — 94640 AIRWAY INHALATION TREATMENT: CPT

## 2024-06-22 PROCEDURE — 82962 GLUCOSE BLOOD TEST: CPT

## 2024-06-22 PROCEDURE — 85027 COMPLETE CBC AUTOMATED: CPT

## 2024-06-22 PROCEDURE — 87040 BLOOD CULTURE FOR BACTERIA: CPT

## 2024-06-22 PROCEDURE — 6360000002 HC RX W HCPCS: Performed by: STUDENT IN AN ORGANIZED HEALTH CARE EDUCATION/TRAINING PROGRAM

## 2024-06-22 PROCEDURE — 1100000003 HC PRIVATE W/ TELEMETRY

## 2024-06-22 PROCEDURE — 71045 X-RAY EXAM CHEST 1 VIEW: CPT

## 2024-06-22 PROCEDURE — 84145 PROCALCITONIN (PCT): CPT

## 2024-06-22 PROCEDURE — 36415 COLL VENOUS BLD VENIPUNCTURE: CPT

## 2024-06-22 PROCEDURE — 84100 ASSAY OF PHOSPHORUS: CPT

## 2024-06-22 PROCEDURE — 6370000000 HC RX 637 (ALT 250 FOR IP): Performed by: INTERNAL MEDICINE

## 2024-06-22 PROCEDURE — 6360000002 HC RX W HCPCS: Performed by: NURSE PRACTITIONER

## 2024-06-22 RX ORDER — LANSOPRAZOLE 30 MG/1
30 TABLET, ORALLY DISINTEGRATING, DELAYED RELEASE ORAL
Status: DISCONTINUED | OUTPATIENT
Start: 2024-06-22 | End: 2024-06-29 | Stop reason: HOSPADM

## 2024-06-22 RX ORDER — SODIUM CHLORIDE 9 MG/ML
INJECTION, SOLUTION INTRAVENOUS CONTINUOUS
Status: DISCONTINUED | OUTPATIENT
Start: 2024-06-22 | End: 2024-06-23

## 2024-06-22 RX ORDER — IPRATROPIUM BROMIDE AND ALBUTEROL SULFATE 2.5; .5 MG/3ML; MG/3ML
1 SOLUTION RESPIRATORY (INHALATION)
Status: DISCONTINUED | OUTPATIENT
Start: 2024-06-22 | End: 2024-06-28

## 2024-06-22 RX ADMIN — IPRATROPIUM BROMIDE AND ALBUTEROL SULFATE 1 DOSE: .5; 3 SOLUTION RESPIRATORY (INHALATION) at 15:12

## 2024-06-22 RX ADMIN — WATER 40 MG: 1 INJECTION INTRAMUSCULAR; INTRAVENOUS; SUBCUTANEOUS at 08:15

## 2024-06-22 RX ADMIN — Medication 100 MG: at 21:02

## 2024-06-22 RX ADMIN — ASPIRIN 81 MG: 81 TABLET, CHEWABLE ORAL at 08:14

## 2024-06-22 RX ADMIN — ARFORMOTEROL TARTRATE 15 MCG: 15 SOLUTION RESPIRATORY (INHALATION) at 07:45

## 2024-06-22 RX ADMIN — IPRATROPIUM BROMIDE AND ALBUTEROL SULFATE 1 DOSE: .5; 3 SOLUTION RESPIRATORY (INHALATION) at 03:59

## 2024-06-22 RX ADMIN — IPRATROPIUM BROMIDE AND ALBUTEROL SULFATE 1 DOSE: .5; 3 SOLUTION RESPIRATORY (INHALATION) at 07:40

## 2024-06-22 RX ADMIN — ENOXAPARIN SODIUM 40 MG: 100 INJECTION SUBCUTANEOUS at 08:14

## 2024-06-22 RX ADMIN — IPRATROPIUM BROMIDE AND ALBUTEROL SULFATE 1 DOSE: .5; 3 SOLUTION RESPIRATORY (INHALATION) at 11:27

## 2024-06-22 RX ADMIN — SODIUM CHLORIDE, PRESERVATIVE FREE 10 ML: 5 INJECTION INTRAVENOUS at 08:15

## 2024-06-22 RX ADMIN — PIPERACILLIN AND TAZOBACTAM 3375 MG: 3; .375 INJECTION, POWDER, LYOPHILIZED, FOR SOLUTION INTRAVENOUS at 20:30

## 2024-06-22 RX ADMIN — LABETALOL HYDROCHLORIDE 10 MG: 5 INJECTION INTRAVENOUS at 18:20

## 2024-06-22 RX ADMIN — AMLODIPINE BESYLATE 10 MG: 5 TABLET ORAL at 08:13

## 2024-06-22 RX ADMIN — CLOPIDOGREL BISULFATE 75 MG: 75 TABLET ORAL at 08:14

## 2024-06-22 RX ADMIN — IRON SUCROSE 200 MG: 20 INJECTION, SOLUTION INTRAVENOUS at 15:43

## 2024-06-22 RX ADMIN — FOLIC ACID 1 MG: 1 TABLET ORAL at 08:14

## 2024-06-22 RX ADMIN — QUETIAPINE FUMARATE 50 MG: 25 TABLET ORAL at 21:02

## 2024-06-22 RX ADMIN — ARFORMOTEROL TARTRATE 15 MCG: 15 SOLUTION RESPIRATORY (INHALATION) at 19:39

## 2024-06-22 RX ADMIN — IPRATROPIUM BROMIDE AND ALBUTEROL SULFATE 1 DOSE: .5; 3 SOLUTION RESPIRATORY (INHALATION) at 19:39

## 2024-06-22 RX ADMIN — BUDESONIDE 250 MCG: 0.25 INHALANT RESPIRATORY (INHALATION) at 07:45

## 2024-06-22 RX ADMIN — PIPERACILLIN AND TAZOBACTAM 3375 MG: 3; .375 INJECTION, POWDER, LYOPHILIZED, FOR SOLUTION INTRAVENOUS at 13:14

## 2024-06-22 RX ADMIN — LANSOPRAZOLE 30 MG: 30 TABLET, ORALLY DISINTEGRATING, DELAYED RELEASE ORAL at 13:15

## 2024-06-22 RX ADMIN — QUETIAPINE FUMARATE 50 MG: 25 TABLET ORAL at 08:14

## 2024-06-22 RX ADMIN — SERTRALINE 50 MG: 50 TABLET, FILM COATED ORAL at 08:14

## 2024-06-22 RX ADMIN — BUDESONIDE 250 MCG: 0.25 INHALANT RESPIRATORY (INHALATION) at 19:39

## 2024-06-22 RX ADMIN — SODIUM CHLORIDE: 9 INJECTION, SOLUTION INTRAVENOUS at 15:21

## 2024-06-22 RX ADMIN — Medication 1 AMPULE: at 08:14

## 2024-06-22 ASSESSMENT — PAIN SCALES - GENERAL
PAINLEVEL_OUTOF10: 0
PAINLEVEL_OUTOF10: 0
PAINLEVEL_OUTOF10: 3

## 2024-06-22 NOTE — PROGRESS NOTES
CRITICAL CARE NOTE      Name: Li Casanova   : 1960   MRN: [id number]   Date: 2024      REASON FOR ICU ADMISSION: Acute hypoxic respiratory failure    PRINCIPAL ICU DIAGNOSIS     Acute hypoxic respiratory failure.  COPD exacerbation.  Toxic metabolic encephalopathy.  Cocaine intoxication/severe agitation.    BRIEF PATIENT SUMMARY   64 y/o female PMHx of CAD, HTN, CVA  COPD/asthma (on chronic home 2L) chronic tobacco use disorder and polysubstance abuse use admitted () by hospital medicine for COPD exacerbation after presenting with acute onset of shortness of breath.  CTA chest-negative for PE.  She was started on systemic steroids and bronchodilators.  Per reports she has a history of daily alcohol use as well as history of polysubstance abuse.  Her UDS was positive for cocaine.  Rapid response was called due to tachycardia (140s), worsening tachypnea with RR in the 40s with diffuse wheezing throughout as well as increased agitation,  Discussed with ICU attending and was transferred to the ICU, started on Precedex infusion, phenobarbital taper and diltiazem infusion.  Was placed on mechanical ventilation for inability to clear secretions/airway protection on ()      : resting comfortably on the ventilator  : deeply sedated  : Failed SAT/SBT this morning, currently on Precedex 1.2, propofol 40, on phenobarbital taper and started on scheduled Seroquel . Triglycerides 462, re-check in AM, may need to stop propofol Continue to assess mentation for appropriateness for SAT/SBT, if fails cuff leak, will need to consider early trach.  : Patient remains intubated and sedated. Intermittently very agitated when sedation weaning is attempted, has copious oral secretions.  : Patient is awake and alert, reports feeling better.  Currently on 4 L oxygen.    COMPREHENSIVE ASSESSMENT & PLAN:SYSTEM BASED     Acute hypoxic/hypercapnic respiratory failure requiring emergent intubation

## 2024-06-22 NOTE — PROGRESS NOTES
Hospitalist Progress Note    NAME:   Li Casanova   : 1960   MRN: [id number]     Date/Time: 2024 1:49 PM  Patient PCP: Star Napier MD    Estimated discharge date: 24  Barriers: needs to have stable hr and breathing , may need PT     Icu course :  64 y/o female PMHx of CAD, HTN, CVA  COPD/asthma (on chronic home 2L) chronic tobacco use disorder and polysubstance abuse use admitted () by hospital medicine for COPD exacerbation after presenting with acute onset of shortness of breath.  CTA chest-negative for PE.  She was started on systemic steroids and bronchodilators.  Per reports she has a history of daily alcohol use as well as history of polysubstance abuse.  Her UDS was positive for cocaine.  Rapid response was called due to tachycardia (140s), worsening tachypnea with RR in the 40s with diffuse wheezing throughout as well as increased agitation. Patient was transferred to the ICU, started on Precedex infusion, phenobarbital taper and diltiazem infusion.     : resting comfortably on the ventilator  : deeply sedated  : Failed SAT/SBT this morning, currently on Precedex 1.2, propofol 40, on phenobarbital taper and started on scheduled Seroquel . Triglycerides 462, re-check in AM, may need to stop propofol Continue to assess mentation for appropriateness for SAT/SBT, if fails cuff leak, will need to consider early trach.  : Patient remains intubated and sedated. Intermittently very agitated when sedation weaning is attempted, has copious oral secretion   : patient was taken off ventilator and et tube removed and transferred to floors .    Assessment / Plan:  Acute hypoxic/hypercapnic respiratory failure requiring emergent intubation -current in the setting of COPD/asthma exacerbation, with inability to protect her airway in the setting tox metabolic encephalopathy due to illicit drug ingestion/withdrawal. (Of note small glottic opening, INTUBATED WITH 7.0  138 24 (!) 89 %   06/21/24 1650 -- -- -- 100 22 90 %   06/21/24 1638 108/68 (!) 96.7 °F (35.9 °C) Axillary 98 24 90 %   06/21/24 1618 -- -- -- (!) 104 25 92 %   06/21/24 1600 (!) 127/106 -- -- (!) 103 20 (!) 88 %   06/21/24 1555 -- -- -- (!) 104 21 91 %   06/21/24 1550 -- -- -- (!) 131 22 98 %   06/21/24 1544 -- -- -- 91 18 94 %   06/21/24 1500 112/64 -- -- 90 18 90 %   06/21/24 1400 104/61 -- -- (!) 101 20 90 %         Intake/Output Summary (Last 24 hours) at 6/22/2024 1349  Last data filed at 6/22/2024 1326  Gross per 24 hour   Intake 315.51 ml   Output 1560 ml   Net -1244.49 ml        I had a face to face encounter and independently examined this patient on 6/22/2024, as outlined below:  PHYSICAL EXAM:  General: Alert, cooperative, hoarse voice   EENT:  EOMI. Anicteric sclerae.  Resp:  CTA bilaterally, no wheezing or rales.  No accessory muscle use  CV:  Regular  rhythm,  No edema.  Tachycardia  GI:  Soft, Non distended, Non tender.  +Bowel sounds  Neurologic:  Alert and oriented X 3, normal speech  Psych:   Good insight. Not anxious nor agitated  Skin:  No rashes.  No jaundice    Reviewed most current lab test results and cultures  YES  Reviewed most current radiology test results   YES  Review and summation of old records today    NO  Reviewed patient's current orders and MAR    YES  PMH/SH reviewed - no change compared to H&P    Procedures: see electronic medical records for all procedures/Xrays and details which were not copied into this note but were reviewed prior to creation of Plan.      LABS:  I reviewed today's most current labs and imaging studies.  Pertinent labs include:  Recent Labs     06/20/24  0529 06/21/24  0503 06/22/24  0420   WBC 10.9 11.3* 16.3*   HGB 8.6* 8.1* 8.6*   HCT 28.1* 26.6* 28.6*    245 308     Recent Labs     06/20/24  0523 06/21/24  0503 06/22/24  0420    136 138   K 4.1 4.5 3.8    106 105   CO2 26 27 29   GLUCOSE 228* 177* 93   BUN 23* 20 24*   CREATININE 0.74

## 2024-06-22 NOTE — PROGRESS NOTES
Bedside and Verbal shift change report given to moiz (oncoming nurse) by Devin (offgoing nurse). Report included the following information Nurse Handoff Report, Index, Adult Overview, Recent Results, Quality Measures, and Neuro Assessment.     0900 patient was alert and oriented to only self this AM in the chair. She sounds hoarse when she talks but lung sounds are clear and her oxygen saturations are around 95%.  She can move all extremities and pulses are palpable.     0915 no more complaints of nausea or vomiting. Gave patient her oral meds with some crackers. Then ordered patient a diet

## 2024-06-22 NOTE — PROGRESS NOTES
1806  Received report from Leatha ELLINGTON  transfer from ICU    1833  Patient arrived via w/c with RN on O2 4 lpm NC  NS infusing at 100 ml/hr and a kvo no complaints of pain  Purewick in place feels the need to void but unable to at this time.  1 person assist to bed from wheelchair  no skin issues noted  BP elevated just received Labetalol PRN prior to arrival.  Lungs diminished but clear  Voice hoarse at times.  Non productive cough noted.  Left eye remains blood shot.  Bed alarm in place patient has call bell instructed to call if needed.    1915  Report given to Maribel ELLINGTON

## 2024-06-22 NOTE — PROGRESS NOTES
1500 Report received from Mariah ELLINGTON.    1527 Procalcitonin, blood culture 2 sets,     1600 Normal saline started at 100 ml/hour, gamez removed, purewick placed    1736 Called [facility] for report, assigned RN to call back per charge nurse    1807 Report given to Jamaal ELLINGTON    1820 PRN Labetalol given SBP> 160 MMHG

## 2024-06-22 NOTE — PROGRESS NOTES
Bedside and Verbal shift change report given to Yinka (oncoming nurse) by Jamaal (offgoing nurse). Report included the following information Nurse Handoff Report, Index, Intake/Output, MAR, Recent Results, and Cardiac Rhythm NSR .     2330: x1 straight cath per protocol. For retention of 562 mL.    0530: x1 Large BM. Cleaned pt up. Bladder scanned 333 mL. Patient states she does not feel any discomfort at this time. Patient encouraged to attempt to void. Offered patient purewick, pt declined. Raffaele pad under patient.       0630: Pt assisted to BSC. Had x1 watery BM, states also urinated.       End of Shift Note    Bedside shift change report given to  (oncoming nurse) by YINKA RAMESH RN (offgoing nurse).  Report included the following information SBAR, Kardex, Intake/Output, MAR, Recent Results, and Cardiac Rhythm NSR    Shift worked:  7p-7a     Shift summary and any significant changes:     See above     Concerns for physician to address:  D/C q6h glucose checks and insulin coverage since not on Tube feeds anymore     Zone phone for oncoming shift:              YINKA RAMESH RN

## 2024-06-23 LAB
ANION GAP SERPL CALC-SCNC: 5 MMOL/L (ref 5–15)
BUN SERPL-MCNC: 20 MG/DL (ref 6–20)
BUN/CREAT SERPL: 27 (ref 12–20)
CALCIUM SERPL-MCNC: 9.4 MG/DL (ref 8.5–10.1)
CHLORIDE SERPL-SCNC: 109 MMOL/L (ref 97–108)
CO2 SERPL-SCNC: 28 MMOL/L (ref 21–32)
CREAT SERPL-MCNC: 0.73 MG/DL (ref 0.55–1.02)
ERYTHROCYTE [DISTWIDTH] IN BLOOD BY AUTOMATED COUNT: 21.3 % (ref 11.5–14.5)
GLUCOSE BLD STRIP.AUTO-MCNC: 117 MG/DL (ref 65–117)
GLUCOSE SERPL-MCNC: 102 MG/DL (ref 65–100)
HCT VFR BLD AUTO: 25.2 % (ref 35–47)
HGB BLD-MCNC: 7.4 G/DL (ref 11.5–16)
MCH RBC QN AUTO: 25.9 PG (ref 26–34)
MCHC RBC AUTO-ENTMCNC: 29.4 G/DL (ref 30–36.5)
MCV RBC AUTO: 88.1 FL (ref 80–99)
NRBC # BLD: 0.02 K/UL (ref 0–0.01)
NRBC BLD-RTO: 0.1 PER 100 WBC
PLATELET # BLD AUTO: 305 K/UL (ref 150–400)
PMV BLD AUTO: 11.2 FL (ref 8.9–12.9)
POTASSIUM SERPL-SCNC: 3.2 MMOL/L (ref 3.5–5.1)
RBC # BLD AUTO: 2.86 M/UL (ref 3.8–5.2)
SERVICE CMNT-IMP: NORMAL
SODIUM SERPL-SCNC: 142 MMOL/L (ref 136–145)
WBC # BLD AUTO: 13.8 K/UL (ref 3.6–11)

## 2024-06-23 PROCEDURE — 6370000000 HC RX 637 (ALT 250 FOR IP): Performed by: STUDENT IN AN ORGANIZED HEALTH CARE EDUCATION/TRAINING PROGRAM

## 2024-06-23 PROCEDURE — 2700000000 HC OXYGEN THERAPY PER DAY

## 2024-06-23 PROCEDURE — 6370000000 HC RX 637 (ALT 250 FOR IP): Performed by: HOSPITALIST

## 2024-06-23 PROCEDURE — 94640 AIRWAY INHALATION TREATMENT: CPT

## 2024-06-23 PROCEDURE — 6360000002 HC RX W HCPCS: Performed by: STUDENT IN AN ORGANIZED HEALTH CARE EDUCATION/TRAINING PROGRAM

## 2024-06-23 PROCEDURE — 1100000003 HC PRIVATE W/ TELEMETRY

## 2024-06-23 PROCEDURE — 82962 GLUCOSE BLOOD TEST: CPT

## 2024-06-23 PROCEDURE — 6370000000 HC RX 637 (ALT 250 FOR IP): Performed by: GENERAL ACUTE CARE HOSPITAL

## 2024-06-23 PROCEDURE — 2580000003 HC RX 258: Performed by: GENERAL ACUTE CARE HOSPITAL

## 2024-06-23 PROCEDURE — 80048 BASIC METABOLIC PNL TOTAL CA: CPT

## 2024-06-23 PROCEDURE — 2580000003 HC RX 258: Performed by: STUDENT IN AN ORGANIZED HEALTH CARE EDUCATION/TRAINING PROGRAM

## 2024-06-23 PROCEDURE — 2580000003 HC RX 258: Performed by: HOSPITALIST

## 2024-06-23 PROCEDURE — 85027 COMPLETE CBC AUTOMATED: CPT

## 2024-06-23 PROCEDURE — 6360000002 HC RX W HCPCS: Performed by: GENERAL ACUTE CARE HOSPITAL

## 2024-06-23 PROCEDURE — 6370000000 HC RX 637 (ALT 250 FOR IP): Performed by: NURSE PRACTITIONER

## 2024-06-23 PROCEDURE — 36415 COLL VENOUS BLD VENIPUNCTURE: CPT

## 2024-06-23 PROCEDURE — 6360000002 HC RX W HCPCS: Performed by: HOSPITALIST

## 2024-06-23 PROCEDURE — 6370000000 HC RX 637 (ALT 250 FOR IP): Performed by: INTERNAL MEDICINE

## 2024-06-23 PROCEDURE — 51798 US URINE CAPACITY MEASURE: CPT

## 2024-06-23 RX ORDER — POTASSIUM CHLORIDE 750 MG/1
40 TABLET, FILM COATED, EXTENDED RELEASE ORAL ONCE
Status: COMPLETED | OUTPATIENT
Start: 2024-06-23 | End: 2024-06-23

## 2024-06-23 RX ADMIN — AMLODIPINE BESYLATE 10 MG: 5 TABLET ORAL at 09:02

## 2024-06-23 RX ADMIN — CLOPIDOGREL BISULFATE 75 MG: 75 TABLET ORAL at 09:01

## 2024-06-23 RX ADMIN — IPRATROPIUM BROMIDE AND ALBUTEROL SULFATE 1 DOSE: .5; 3 SOLUTION RESPIRATORY (INHALATION) at 07:18

## 2024-06-23 RX ADMIN — ARFORMOTEROL TARTRATE 15 MCG: 15 SOLUTION RESPIRATORY (INHALATION) at 19:35

## 2024-06-23 RX ADMIN — SODIUM CHLORIDE: 9 INJECTION, SOLUTION INTRAVENOUS at 12:37

## 2024-06-23 RX ADMIN — 0.12% CHLORHEXIDINE GLUCONATE 15 ML: 1.2 RINSE ORAL at 09:02

## 2024-06-23 RX ADMIN — IPRATROPIUM BROMIDE AND ALBUTEROL SULFATE 1 DOSE: .5; 3 SOLUTION RESPIRATORY (INHALATION) at 15:02

## 2024-06-23 RX ADMIN — BUDESONIDE 250 MCG: 0.25 INHALANT RESPIRATORY (INHALATION) at 07:23

## 2024-06-23 RX ADMIN — POTASSIUM CHLORIDE 40 MEQ: 750 TABLET, FILM COATED, EXTENDED RELEASE ORAL at 10:22

## 2024-06-23 RX ADMIN — IPRATROPIUM BROMIDE AND ALBUTEROL SULFATE 1 DOSE: .5; 3 SOLUTION RESPIRATORY (INHALATION) at 19:35

## 2024-06-23 RX ADMIN — FOLIC ACID 1 MG: 1 TABLET ORAL at 09:01

## 2024-06-23 RX ADMIN — PIPERACILLIN AND TAZOBACTAM 3375 MG: 3; .375 INJECTION, POWDER, LYOPHILIZED, FOR SOLUTION INTRAVENOUS at 21:02

## 2024-06-23 RX ADMIN — SODIUM CHLORIDE: 9 INJECTION, SOLUTION INTRAVENOUS at 01:45

## 2024-06-23 RX ADMIN — IRON SUCROSE 200 MG: 20 INJECTION, SOLUTION INTRAVENOUS at 16:31

## 2024-06-23 RX ADMIN — QUETIAPINE FUMARATE 50 MG: 25 TABLET ORAL at 21:02

## 2024-06-23 RX ADMIN — LANSOPRAZOLE 30 MG: 30 TABLET, ORALLY DISINTEGRATING, DELAYED RELEASE ORAL at 06:07

## 2024-06-23 RX ADMIN — ATORVASTATIN CALCIUM 20 MG: 20 TABLET, FILM COATED ORAL at 09:02

## 2024-06-23 RX ADMIN — ENOXAPARIN SODIUM 40 MG: 100 INJECTION SUBCUTANEOUS at 09:03

## 2024-06-23 RX ADMIN — ASPIRIN 81 MG: 81 TABLET, CHEWABLE ORAL at 09:01

## 2024-06-23 RX ADMIN — IPRATROPIUM BROMIDE AND ALBUTEROL SULFATE 1 DOSE: .5; 3 SOLUTION RESPIRATORY (INHALATION) at 11:08

## 2024-06-23 RX ADMIN — PIPERACILLIN AND TAZOBACTAM 3375 MG: 3; .375 INJECTION, POWDER, LYOPHILIZED, FOR SOLUTION INTRAVENOUS at 04:32

## 2024-06-23 RX ADMIN — PIPERACILLIN AND TAZOBACTAM 3375 MG: 3; .375 INJECTION, POWDER, LYOPHILIZED, FOR SOLUTION INTRAVENOUS at 12:38

## 2024-06-23 RX ADMIN — SERTRALINE 50 MG: 50 TABLET, FILM COATED ORAL at 09:01

## 2024-06-23 RX ADMIN — QUETIAPINE FUMARATE 50 MG: 25 TABLET ORAL at 09:02

## 2024-06-23 RX ADMIN — Medication 1 AMPULE: at 09:03

## 2024-06-23 RX ADMIN — BUDESONIDE 250 MCG: 0.25 INHALANT RESPIRATORY (INHALATION) at 19:35

## 2024-06-23 RX ADMIN — SODIUM CHLORIDE, PRESERVATIVE FREE 10 ML: 5 INJECTION INTRAVENOUS at 09:04

## 2024-06-23 RX ADMIN — Medication 100 MG: at 21:02

## 2024-06-23 RX ADMIN — WATER 40 MG: 1 INJECTION INTRAMUSCULAR; INTRAVENOUS; SUBCUTANEOUS at 09:02

## 2024-06-23 RX ADMIN — SODIUM CHLORIDE, PRESERVATIVE FREE 10 ML: 5 INJECTION INTRAVENOUS at 09:03

## 2024-06-23 RX ADMIN — ARFORMOTEROL TARTRATE 15 MCG: 15 SOLUTION RESPIRATORY (INHALATION) at 07:23

## 2024-06-23 NOTE — PROGRESS NOTES
Bedside and Verbal shift change report given to Maribel (oncoming nurse) by Van (offgoing nurse). Report included the following information Nurse Handoff Report, Index, Intake/Output, MAR, Recent Results, and Cardiac Rhythm NSR / Sinus tach .         End of Shift Note    Bedside shift change report given to  (oncoming nurse) by MARIBEL RAMESH RN (offgoing nurse).  Report included the following information SBAR, Kardex, Intake/Output, MAR, Recent Results, and Cardiac Rhythm NSR / Sinus tach    Shift worked:  7p-7a     Shift summary and any significant changes:     Patient more active tonight, trying to get out of bed multiple times (bed alarm in place and engaged), is restless and fidgeting a lot while in bed but is redirectable. Also had asked this RN \"to go to the store and grab me a beer\", explained to the patient that she is in the hospital and that she would not be getting a beer.        Concerns for physician to address:       Zone phone for oncoming shift:            MARIBEL RAMESH RN

## 2024-06-23 NOTE — PROGRESS NOTES
ADULT PROTOCOL: JET AEROSOL ASSESSMENT    Patient  Li Casanova     63 y.o.   female     6/23/2024  8:10 AM    Breath Sounds Pre Procedure: Breath Sounds Pre-Tx VANESSA: Expiratory wheezes                                  Breath Sounds Pre-Tx LLL: Expiratory wheezes        Breath Sounds Pre-Tx RUL: Expiratory wheezes        Breath Sounds Pre-Tx RML: Expiratory wheezes        Breath Sounds Pre-Tx RLL: Expiratory wheezes  Breath Sounds Post Procedure: Breath Sounds Post-Tx VANESSA: Expiratory wheezes          Breath Sounds Post-Tx LLL: Expiratory wheezes          Breath Sounds Post-Tx RUL: Expiratory wheezes          Breath Sounds Post-Tx RML: Expiratory wheezes          Breath Sounds Post-Tx RLL: Expiratory wheezes                                         Heart Rate: Pre procedure Pre-Tx Pulse: 120           Post procedure Post-Tx Pulse: 112    Resp Rate: Pre procedure Pre-Tx Resps: 26           Post procedure Post-Tx Resps: 22        Oxygen: O2 Therapy: Oxygen humidified   nasal cannula     Changed: No    SpO2:  SpO2: 100 %   with Oxygen                Nebulizer Therapy: Current medications Medications: Budesonide/Formoterol      Changed: No          Problem List:   Patient Active Problem List   Diagnosis    NSTEMI (non-ST elevated myocardial infarction) ([facility])    COPD (chronic obstructive pulmonary disease) ([facility])    Acute exacerbation of chronic obstructive pulmonary disease (COPD) ([facility])       Respiratory Therapist: Mariel Rebollar RT

## 2024-06-23 NOTE — PLAN OF CARE
Problem: Discharge Planning  Goal: Discharge to home or other facility with appropriate resources  Outcome: Progressing  Flowsheets (Taken 6/23/2024 0751)  Discharge to home or other facility with appropriate resources: Identify barriers to discharge with patient and caregiver     Problem: Skin/Tissue Integrity  Goal: Absence of new skin breakdown  Description: 1.  Monitor for areas of redness and/or skin breakdown  2.  Assess vascular access sites hourly  3.  Every 4-6 hours minimum:  Change oxygen saturation probe site  4.  Every 4-6 hours:  If on nasal continuous positive airway pressure, respiratory therapy assess nares and determine need for appliance change or resting period.  Outcome: Progressing     Problem: Safety - Adult  Goal: Free from fall injury  Outcome: Progressing     Problem: Respiratory - Adult  Goal: Achieves optimal ventilation and oxygenation  6/23/2024 1716 by Nico Newton RN  Outcome: Progressing  6/23/2024 0809 by Mariel Montgomery, RT  Outcome: Progressing     Problem: Pain  Goal: Verbalizes/displays adequate comfort level or baseline comfort level  Outcome: Progressing     Problem: Chronic Conditions and Co-morbidities  Goal: Patient's chronic conditions and co-morbidity symptoms are monitored and maintained or improved  Outcome: Progressing     Problem: Nutrition Deficit:  Goal: Optimize nutritional status  Outcome: Progressing     Problem: ABCDS Injury Assessment  Goal: Absence of physical injury  Outcome: Progressing

## 2024-06-23 NOTE — PLAN OF CARE
Problem: Discharge Planning  Goal: Discharge to home or other facility with appropriate resources  Outcome: Progressing     Problem: Skin/Tissue Integrity  Goal: Absence of new skin breakdown  Description: 1.  Monitor for areas of redness and/or skin breakdown  2.  Assess vascular access sites hourly  3.  Every 4-6 hours minimum:  Change oxygen saturation probe site  4.  Every 4-6 hours:  If on nasal continuous positive airway pressure, respiratory therapy assess nares and determine need for appliance change or resting period.  Outcome: Progressing     Problem: Safety - Adult  Goal: Free from fall injury  Outcome: Progressing     Problem: Respiratory - Adult  Goal: Achieves optimal ventilation and oxygenation  6/22/2024 2235 by Maribel Stafford, RN  Outcome: Progressing  6/22/2024 2223 by Sana Corona RCP  Outcome: Progressing  6/22/2024 0952 by Mariel Montgomery, RT  Outcome: Progressing  Flowsheets (Taken 6/21/2024 2000 by Junaid Shaffer, RN)  Achieves optimal ventilation and oxygenation: Assess for changes in respiratory status     Problem: Pain  Goal: Verbalizes/displays adequate comfort level or baseline comfort level  Outcome: Progressing     Problem: Chronic Conditions and Co-morbidities  Goal: Patient's chronic conditions and co-morbidity symptoms are monitored and maintained or improved  Outcome: Progressing     Problem: Nutrition Deficit:  Goal: Optimize nutritional status  Outcome: Progressing     Problem: ABCDS Injury Assessment  Goal: Absence of physical injury  Outcome: Progressing

## 2024-06-23 NOTE — PROGRESS NOTES
Not anxious nor agitated  Skin/MSK: No rashes.  No jaundice    Orthostatic Vitals:       No data to display                  Current Inpatient Medications:      Current Facility-Administered Medications:     lansoprazole (PREVACID SOLUTAB) disintegrating tablet 30 mg, 30 mg, Oral, QAM AC, Sade Wheeler MD, 30 mg at 06/23/24 0607    piperacillin-tazobactam (ZOSYN) 3,375 mg in sodium chloride 0.9 % 50 mL IVPB (mini-bag), 3,375 mg, IntraVENous, q8h, Sade Wheeler MD, Stopped at 06/23/24 0904    0.9 % sodium chloride infusion, , IntraVENous, Continuous, Sabina Rosas MD, Last Rate: 100 mL/hr at 06/23/24 0145, New Bag at 06/23/24 0145    iron sucrose (VENOFER) injection 200 mg, 200 mg, IntraVENous, Q24H, Sabina Rosas MD, 200 mg at 06/22/24 1543    ipratropium 0.5 mg-albuterol 2.5 mg (DUONEB) nebulizer solution 1 Dose, 1 Dose, Inhalation, Q4H WA RT, Sabina Rosas MD, 1 Dose at 06/23/24 0718    methylPREDNISolone sodium succ (SOLU-MEDROL) 40 mg in sterile water 1 mL injection, 40 mg, IntraVENous, Daily, Sade Wheeler MD, 40 mg at 06/23/24 0902    labetalol (NORMODYNE;TRANDATE) injection syringe 10 mg, 10 mg, IntraVENous, Q4H PRN, Stephany Wylie, APRN - NP, 10 mg at 06/22/24 1820    QUEtiapine (SEROQUEL) tablet 50 mg, 50 mg, Oral, BID, Jorge Jacome MD, 50 mg at 06/23/24 0902    amLODIPine (NORVASC) tablet 10 mg, 10 mg, Oral, Daily, Jorge Jacome MD, 10 mg at 06/23/24 0902    alcohol 62% (NOZIN) nasal  1 ampule, 1 ampule, Nasal, Q12H, Jorge Jacome MD, 1 ampule at 06/23/24 0903    chlorhexidine (PERIDEX) 0.12 % solution 15 mL, 15 mL, Mouth/Throat, BID, Jorge Jacome MD, 15 mL at 06/23/24 0902    thiamine mononitrate tablet 100 mg, 100 mg, Orogastric, Daily, Jorge Jacome MD, 100 mg at 06/22/24 2102    sertraline (ZOLOFT) tablet 50 mg, 50 mg, Orogastric, Daily, Jorge Jacome MD, 50 mg at 06/23/24 0901    [Held by provider] hydroCHLOROthiazide  insulin lispro (HUMALOG,ADMELOG) injection vial 0-8 Units, 0-8 Units, SubCUTAneous, Q6H, Stephany Wylie B, APRN - NP, 2 Units at 06/20/24 0525    glucose chewable tablet 16 g, 4 tablet, Oral, PRN, Carlos Albertoavethiago, Stephany B, APRN - NP    dextrose bolus 10% 125 mL, 125 mL, IntraVENous, PRN **OR** dextrose bolus 10% 250 mL, 250 mL, IntraVENous, PRN, Dejan Stephany B, APRN - NP    glucagon injection 1 mg, 1 mg, SubCUTAneous, PRN, Heavethiago, Stephany B, APRN - NP    dextrose 10 % infusion, , IntraVENous, Continuous PRN, Dejan Stephany B, APRN - NP    clopidogrel (PLAVIX) tablet 75 mg, 75 mg, Orogastric, Daily, Heavenrkaur, Stephany B, APRN - NP, 75 mg at 06/23/24 0901    aspirin chewable tablet 81 mg, 81 mg, Orogastric, Daily, Heavenrkaur Stephany B, APRN - NP, 81 mg at 06/23/24 0901    atorvastatin (LIPITOR) tablet 20 mg, 20 mg, Orogastric, Daily, Heavenrkaur, Stephany B, APRN - NP, 20 mg at 06/23/24 0902    folic acid (FOLVITE) tablet 1 mg, 1 mg, Orogastric, Daily, Heavenridge, Stephany B, APRN - NP, 1 mg at 06/23/24 0901       LABS:    I reviewed today's most current labs and imaging studies.    Pertinent labs include:  Recent Labs     06/21/24  0503 06/22/24  0420 06/23/24  0151   WBC 11.3* 16.3* 13.8*   HGB 8.1* 8.6* 7.4*   HCT 26.6* 28.6* 25.2*    308 305     Recent Labs     06/21/24  0503 06/22/24  0420 06/23/24  0151    138 142   K 4.5 3.8 3.2*    105 109*   CO2 27 29 28   GLUCOSE 177* 93 102*   BUN 20 24* 20   CREATININE 0.68 0.71 0.73   CALCIUM 9.2 10.2* 9.4   MG 2.7* 2.6*  --    PHOS 3.6 3.4  --      Xray Result (most recent):  XR CHEST PORTABLE  Narrative: EXAM:  XR CHEST PORTABLE    INDICATION: Re-eval     COMPARISON: June 16    TECHNIQUE: portable chest AP view    FINDINGS: The cardiac silhouette is unchanged. The pulmonary vasculature is  within normal limits.   ET tube and NG tube in place unchanged. Bilateral pleural effusions and basilar  atelectasis unchanged.  Impression: No

## 2024-06-24 LAB
ANION GAP SERPL CALC-SCNC: 7 MMOL/L (ref 5–15)
BUN SERPL-MCNC: 16 MG/DL (ref 6–20)
BUN/CREAT SERPL: 24 (ref 12–20)
CALCIUM SERPL-MCNC: 9.9 MG/DL (ref 8.5–10.1)
CHLORIDE SERPL-SCNC: 110 MMOL/L (ref 97–108)
CO2 SERPL-SCNC: 26 MMOL/L (ref 21–32)
CREAT SERPL-MCNC: 0.66 MG/DL (ref 0.55–1.02)
ERYTHROCYTE [DISTWIDTH] IN BLOOD BY AUTOMATED COUNT: 20.7 % (ref 11.5–14.5)
ERYTHROCYTE [DISTWIDTH] IN BLOOD BY AUTOMATED COUNT: 21 % (ref 11.5–14.5)
GLUCOSE SERPL-MCNC: 121 MG/DL (ref 65–100)
HCT VFR BLD AUTO: 21 % (ref 35–47)
HCT VFR BLD AUTO: 23.3 % (ref 35–47)
HGB BLD-MCNC: 6.4 G/DL (ref 11.5–16)
HGB BLD-MCNC: 6.8 G/DL (ref 11.5–16)
MAGNESIUM SERPL-MCNC: 2.2 MG/DL (ref 1.6–2.4)
MCH RBC QN AUTO: 25.9 PG (ref 26–34)
MCH RBC QN AUTO: 26.4 PG (ref 26–34)
MCHC RBC AUTO-ENTMCNC: 29.2 G/DL (ref 30–36.5)
MCHC RBC AUTO-ENTMCNC: 30.5 G/DL (ref 30–36.5)
MCV RBC AUTO: 86.8 FL (ref 80–99)
MCV RBC AUTO: 88.6 FL (ref 80–99)
NRBC # BLD: 0.04 K/UL (ref 0–0.01)
NRBC # BLD: 0.04 K/UL (ref 0–0.01)
NRBC BLD-RTO: 0.3 PER 100 WBC
NRBC BLD-RTO: 0.3 PER 100 WBC
PLATELET # BLD AUTO: 314 K/UL (ref 150–400)
PLATELET # BLD AUTO: 338 K/UL (ref 150–400)
PMV BLD AUTO: 10.5 FL (ref 8.9–12.9)
PMV BLD AUTO: 11 FL (ref 8.9–12.9)
POTASSIUM SERPL-SCNC: 3.1 MMOL/L (ref 3.5–5.1)
RBC # BLD AUTO: 2.42 M/UL (ref 3.8–5.2)
RBC # BLD AUTO: 2.63 M/UL (ref 3.8–5.2)
SODIUM SERPL-SCNC: 143 MMOL/L (ref 136–145)
WBC # BLD AUTO: 14.3 K/UL (ref 3.6–11)
WBC # BLD AUTO: 14.8 K/UL (ref 3.6–11)

## 2024-06-24 PROCEDURE — 6370000000 HC RX 637 (ALT 250 FOR IP): Performed by: NURSE PRACTITIONER

## 2024-06-24 PROCEDURE — 2580000003 HC RX 258: Performed by: HOSPITALIST

## 2024-06-24 PROCEDURE — 1100000003 HC PRIVATE W/ TELEMETRY

## 2024-06-24 PROCEDURE — 6370000000 HC RX 637 (ALT 250 FOR IP): Performed by: INTERNAL MEDICINE

## 2024-06-24 PROCEDURE — 85027 COMPLETE CBC AUTOMATED: CPT

## 2024-06-24 PROCEDURE — 6370000000 HC RX 637 (ALT 250 FOR IP): Performed by: STUDENT IN AN ORGANIZED HEALTH CARE EDUCATION/TRAINING PROGRAM

## 2024-06-24 PROCEDURE — 36415 COLL VENOUS BLD VENIPUNCTURE: CPT

## 2024-06-24 PROCEDURE — 86901 BLOOD TYPING SEROLOGIC RH(D): CPT

## 2024-06-24 PROCEDURE — 2580000003 HC RX 258: Performed by: STUDENT IN AN ORGANIZED HEALTH CARE EDUCATION/TRAINING PROGRAM

## 2024-06-24 PROCEDURE — 86900 BLOOD TYPING SEROLOGIC ABO: CPT

## 2024-06-24 PROCEDURE — 6360000002 HC RX W HCPCS: Performed by: INTERNAL MEDICINE

## 2024-06-24 PROCEDURE — 6360000002 HC RX W HCPCS: Performed by: HOSPITALIST

## 2024-06-24 PROCEDURE — 86850 RBC ANTIBODY SCREEN: CPT

## 2024-06-24 PROCEDURE — 2580000003 HC RX 258: Performed by: GENERAL ACUTE CARE HOSPITAL

## 2024-06-24 PROCEDURE — 97116 GAIT TRAINING THERAPY: CPT

## 2024-06-24 PROCEDURE — 97161 PT EVAL LOW COMPLEX 20 MIN: CPT

## 2024-06-24 PROCEDURE — 97530 THERAPEUTIC ACTIVITIES: CPT

## 2024-06-24 PROCEDURE — 83735 ASSAY OF MAGNESIUM: CPT

## 2024-06-24 PROCEDURE — 86923 COMPATIBILITY TEST ELECTRIC: CPT

## 2024-06-24 PROCEDURE — 6360000002 HC RX W HCPCS: Performed by: STUDENT IN AN ORGANIZED HEALTH CARE EDUCATION/TRAINING PROGRAM

## 2024-06-24 PROCEDURE — 80048 BASIC METABOLIC PNL TOTAL CA: CPT

## 2024-06-24 PROCEDURE — 94640 AIRWAY INHALATION TREATMENT: CPT

## 2024-06-24 PROCEDURE — 6360000002 HC RX W HCPCS: Performed by: GENERAL ACUTE CARE HOSPITAL

## 2024-06-24 PROCEDURE — 97165 OT EVAL LOW COMPLEX 30 MIN: CPT

## 2024-06-24 PROCEDURE — 97535 SELF CARE MNGMENT TRAINING: CPT

## 2024-06-24 PROCEDURE — 6370000000 HC RX 637 (ALT 250 FOR IP): Performed by: HOSPITALIST

## 2024-06-24 RX ORDER — POTASSIUM CHLORIDE 7.45 MG/ML
10 INJECTION INTRAVENOUS
Status: COMPLETED | OUTPATIENT
Start: 2024-06-24 | End: 2024-06-24

## 2024-06-24 RX ORDER — LORAZEPAM 2 MG/ML
0.5 INJECTION INTRAMUSCULAR EVERY 12 HOURS PRN
Status: DISCONTINUED | OUTPATIENT
Start: 2024-06-24 | End: 2024-06-24

## 2024-06-24 RX ORDER — LORAZEPAM 1 MG/1
4 TABLET ORAL
Status: DISCONTINUED | OUTPATIENT
Start: 2024-06-24 | End: 2024-06-29 | Stop reason: HOSPADM

## 2024-06-24 RX ORDER — POTASSIUM CHLORIDE 7.45 MG/ML
10 INJECTION INTRAVENOUS
Status: DISPENSED | OUTPATIENT
Start: 2024-06-24 | End: 2024-06-24

## 2024-06-24 RX ORDER — GAUZE BANDAGE 2" X 2"
100 BANDAGE TOPICAL DAILY
Status: DISCONTINUED | OUTPATIENT
Start: 2024-06-24 | End: 2024-06-29 | Stop reason: HOSPADM

## 2024-06-24 RX ORDER — LORAZEPAM 2 MG/ML
3 INJECTION INTRAMUSCULAR
Status: DISCONTINUED | OUTPATIENT
Start: 2024-06-24 | End: 2024-06-29 | Stop reason: HOSPADM

## 2024-06-24 RX ORDER — POTASSIUM CHLORIDE 750 MG/1
40 TABLET, FILM COATED, EXTENDED RELEASE ORAL ONCE
Status: DISCONTINUED | OUTPATIENT
Start: 2024-06-24 | End: 2024-06-24

## 2024-06-24 RX ORDER — LORAZEPAM 2 MG/ML
2 INJECTION INTRAMUSCULAR
Status: DISCONTINUED | OUTPATIENT
Start: 2024-06-24 | End: 2024-06-29 | Stop reason: HOSPADM

## 2024-06-24 RX ORDER — SODIUM CHLORIDE 0.9 % (FLUSH) 0.9 %
5-40 SYRINGE (ML) INJECTION EVERY 12 HOURS SCHEDULED
Status: DISCONTINUED | OUTPATIENT
Start: 2024-06-24 | End: 2024-06-29 | Stop reason: HOSPADM

## 2024-06-24 RX ORDER — POTASSIUM CHLORIDE 750 MG/1
40 TABLET, FILM COATED, EXTENDED RELEASE ORAL ONCE
Status: DISCONTINUED | OUTPATIENT
Start: 2024-06-24 | End: 2024-06-25

## 2024-06-24 RX ORDER — LORAZEPAM 1 MG/1
3 TABLET ORAL
Status: DISCONTINUED | OUTPATIENT
Start: 2024-06-24 | End: 2024-06-29 | Stop reason: HOSPADM

## 2024-06-24 RX ORDER — LORAZEPAM 1 MG/1
2 TABLET ORAL
Status: DISCONTINUED | OUTPATIENT
Start: 2024-06-24 | End: 2024-06-29 | Stop reason: HOSPADM

## 2024-06-24 RX ORDER — HYDROXYZINE HYDROCHLORIDE 10 MG/1
10 TABLET, FILM COATED ORAL 3 TIMES DAILY PRN
Status: DISCONTINUED | OUTPATIENT
Start: 2024-06-24 | End: 2024-06-29 | Stop reason: HOSPADM

## 2024-06-24 RX ORDER — SODIUM CHLORIDE 0.9 % (FLUSH) 0.9 %
5-40 SYRINGE (ML) INJECTION PRN
Status: DISCONTINUED | OUTPATIENT
Start: 2024-06-24 | End: 2024-06-29 | Stop reason: HOSPADM

## 2024-06-24 RX ORDER — FOLIC ACID 1 MG/1
1 TABLET ORAL DAILY
Status: DISCONTINUED | OUTPATIENT
Start: 2024-06-24 | End: 2024-06-29 | Stop reason: HOSPADM

## 2024-06-24 RX ORDER — LORAZEPAM 2 MG/ML
1 INJECTION INTRAMUSCULAR
Status: DISCONTINUED | OUTPATIENT
Start: 2024-06-24 | End: 2024-06-29 | Stop reason: HOSPADM

## 2024-06-24 RX ORDER — LORAZEPAM 1 MG/1
1 TABLET ORAL
Status: DISCONTINUED | OUTPATIENT
Start: 2024-06-24 | End: 2024-06-29 | Stop reason: HOSPADM

## 2024-06-24 RX ORDER — ALPRAZOLAM 0.5 MG/1
0.5 TABLET ORAL 3 TIMES DAILY PRN
Status: DISCONTINUED | OUTPATIENT
Start: 2024-06-24 | End: 2024-06-24

## 2024-06-24 RX ORDER — LORAZEPAM 2 MG/ML
4 INJECTION INTRAMUSCULAR
Status: DISCONTINUED | OUTPATIENT
Start: 2024-06-24 | End: 2024-06-29 | Stop reason: HOSPADM

## 2024-06-24 RX ORDER — SODIUM CHLORIDE 9 MG/ML
INJECTION, SOLUTION INTRAVENOUS PRN
Status: DISCONTINUED | OUTPATIENT
Start: 2024-06-24 | End: 2024-06-29 | Stop reason: HOSPADM

## 2024-06-24 RX ADMIN — WATER 40 MG: 1 INJECTION INTRAMUSCULAR; INTRAVENOUS; SUBCUTANEOUS at 08:17

## 2024-06-24 RX ADMIN — IPRATROPIUM BROMIDE AND ALBUTEROL SULFATE 1 DOSE: .5; 3 SOLUTION RESPIRATORY (INHALATION) at 13:26

## 2024-06-24 RX ADMIN — IPRATROPIUM BROMIDE AND ALBUTEROL SULFATE 1 DOSE: .5; 3 SOLUTION RESPIRATORY (INHALATION) at 21:04

## 2024-06-24 RX ADMIN — SODIUM CHLORIDE: 9 INJECTION, SOLUTION INTRAVENOUS at 16:52

## 2024-06-24 RX ADMIN — BUDESONIDE 250 MCG: 0.25 INHALANT RESPIRATORY (INHALATION) at 07:51

## 2024-06-24 RX ADMIN — LORAZEPAM 0.5 MG: 2 INJECTION INTRAMUSCULAR; INTRAVENOUS at 09:02

## 2024-06-24 RX ADMIN — POTASSIUM CHLORIDE 10 MEQ: 7.46 INJECTION, SOLUTION INTRAVENOUS at 16:22

## 2024-06-24 RX ADMIN — SODIUM CHLORIDE, PRESERVATIVE FREE 10 ML: 5 INJECTION INTRAVENOUS at 19:27

## 2024-06-24 RX ADMIN — SODIUM CHLORIDE, PRESERVATIVE FREE 10 ML: 5 INJECTION INTRAVENOUS at 08:18

## 2024-06-24 RX ADMIN — QUETIAPINE FUMARATE 50 MG: 25 TABLET ORAL at 08:16

## 2024-06-24 RX ADMIN — LEVALBUTEROL HYDROCHLORIDE 1.25 MG: 1.25 SOLUTION RESPIRATORY (INHALATION) at 05:36

## 2024-06-24 RX ADMIN — Medication 1 AMPULE: at 19:30

## 2024-06-24 RX ADMIN — IPRATROPIUM BROMIDE AND ALBUTEROL SULFATE 1 DOSE: .5; 3 SOLUTION RESPIRATORY (INHALATION) at 15:40

## 2024-06-24 RX ADMIN — LORAZEPAM 1 MG: 2 INJECTION INTRAMUSCULAR; INTRAVENOUS at 13:23

## 2024-06-24 RX ADMIN — POTASSIUM CHLORIDE 10 MEQ: 7.46 INJECTION, SOLUTION INTRAVENOUS at 21:20

## 2024-06-24 RX ADMIN — 0.12% CHLORHEXIDINE GLUCONATE 15 ML: 1.2 RINSE ORAL at 08:17

## 2024-06-24 RX ADMIN — BUDESONIDE 250 MCG: 0.25 INHALANT RESPIRATORY (INHALATION) at 21:04

## 2024-06-24 RX ADMIN — SERTRALINE 50 MG: 50 TABLET, FILM COATED ORAL at 08:17

## 2024-06-24 RX ADMIN — PIPERACILLIN AND TAZOBACTAM 3375 MG: 3; .375 INJECTION, POWDER, LYOPHILIZED, FOR SOLUTION INTRAVENOUS at 16:53

## 2024-06-24 RX ADMIN — IPRATROPIUM BROMIDE AND ALBUTEROL SULFATE 1 DOSE: .5; 3 SOLUTION RESPIRATORY (INHALATION) at 07:45

## 2024-06-24 RX ADMIN — ATORVASTATIN CALCIUM 20 MG: 20 TABLET, FILM COATED ORAL at 08:16

## 2024-06-24 RX ADMIN — LANSOPRAZOLE 30 MG: 30 TABLET, ORALLY DISINTEGRATING, DELAYED RELEASE ORAL at 06:34

## 2024-06-24 RX ADMIN — LORAZEPAM 2 MG: 2 INJECTION INTRAMUSCULAR; INTRAVENOUS at 11:27

## 2024-06-24 RX ADMIN — POTASSIUM CHLORIDE 10 MEQ: 7.46 INJECTION, SOLUTION INTRAVENOUS at 13:14

## 2024-06-24 RX ADMIN — ARFORMOTEROL TARTRATE 15 MCG: 15 SOLUTION RESPIRATORY (INHALATION) at 07:51

## 2024-06-24 RX ADMIN — IRON SUCROSE 200 MG: 20 INJECTION, SOLUTION INTRAVENOUS at 16:21

## 2024-06-24 RX ADMIN — SODIUM CHLORIDE: 9 INJECTION, SOLUTION INTRAVENOUS at 13:01

## 2024-06-24 RX ADMIN — PIPERACILLIN AND TAZOBACTAM 3375 MG: 3; .375 INJECTION, POWDER, LYOPHILIZED, FOR SOLUTION INTRAVENOUS at 05:02

## 2024-06-24 RX ADMIN — FOLIC ACID 1 MG: 1 TABLET ORAL at 08:16

## 2024-06-24 RX ADMIN — PIPERACILLIN AND TAZOBACTAM 3375 MG: 3; .375 INJECTION, POWDER, LYOPHILIZED, FOR SOLUTION INTRAVENOUS at 20:16

## 2024-06-24 RX ADMIN — POTASSIUM CHLORIDE 10 MEQ: 7.46 INJECTION, SOLUTION INTRAVENOUS at 20:18

## 2024-06-24 RX ADMIN — ARFORMOTEROL TARTRATE 15 MCG: 15 SOLUTION RESPIRATORY (INHALATION) at 21:04

## 2024-06-24 ASSESSMENT — PAIN SCALES - GENERAL: PAINLEVEL_OUTOF10: 0

## 2024-06-24 NOTE — CARE COORDINATION
Transition of Care Plan:    RUR: 28% (high RUR)  Prior Level of Functioning: Assistance with ADLs  Disposition: Rehab likely needed, pending PT/OT recs (IPR anticipated due to Medicaid), then return home with family (cousin is looking into CGs and LTSS done 1/31/24 per previous CM note)  If SNF or IPR: Date FOC offered: 6/24  Date FOC received: 6/24 - [facility]  Accepting facility: [facility]  Date authorization started with reference number: [facility]  Date authorization received and expires: TBD   Follow up appointments: defer to rehab.  DME needed: defer to rehab.  Patient has 2L O2 NC and cane at home.  Transportation at discharge: Stretcher anticipated to be needed.  IM/IMM Medicare/ letter given: N/A Medicaid  Is patient a [place] and connected with [facility]? No   If yes, was [place] transfer form completed and [facility] notified? N/A  Caregiver Contact: Amaya diamond - [phone]  Discharge Caregiver contacted prior to discharge? Caregiver to be contacted prior to discharge.   Care Conference needed? No  Barriers to discharge: CIWA, Hgb/HR stability    1410 to 1418 - CM spoke with Amaya (cousin) who confirmed patient does not have any living spouse, children, parents or siblings.  She is agreeable to patient going to [facility] and [facility]; referrals sent via [facility].    1619 to 1623 - [facility] will review patient at bedside tomorrow.  [facility] and [facility] contact information left on AVS.      BISI BlairN, RN    Care Management  [phone]

## 2024-06-24 NOTE — PROGRESS NOTES
1900: Bedside and Verbal shift change report given to Socorro Tejeda RN (oncoming nurse) by Lico Walsh (offgoing nurse). Report included the following information Nurse Handoff Report, Index, Intake/Output, MAR, Recent Results, and Cardiac Rhythm NSR to ST .

## 2024-06-24 NOTE — PLAN OF CARE
Problem: Physical Therapy - Adult  Goal: By Discharge: Performs mobility at highest level of function for planned discharge setting.  See evaluation for individualized goals.  Description: FUNCTIONAL STATUS PRIOR TO ADMISSION: Patient was modified independent without AD for functional mobility.  Patient is not a reliable historian, but reports she ambulated household distances without assistance and denies falls.  Has history of daily drinking and positive for cocaine on admission.      HOME SUPPORT PRIOR TO ADMISSION: The patient lived with family who can assist as needed.    Physical Therapy Goals  Initiated 6/24/2024  1.  Patient will move from supine to sit and sit to supine in bed with independence within 7 day(s).    2.  Patient will perform sit to stand with supervision/set-up within 7 day(s).  3.  Patient will transfer from bed to chair and chair to bed with supervision/set-up using the least restrictive device within 7 day(s).  4.  Patient will ambulate with supervision/set-up for 150 feet with the least restrictive device within 7 day(s).   5.  Patient will ascend/descend 3 stairs with  handrail(s) with contact guard assist within 7 day(s).   6/24/2024 1058 by Lori Joseph, PT  Outcome: Not Progressing     Problem: Occupational Therapy - Adult  Goal: By Discharge: Performs self-care activities at highest level of function for planned discharge setting.  See evaluation for individualized goals.  Description: FUNCTIONAL STATUS PRIOR TO ADMISSION:  Patient requires assistance for ADLs, ambulates without AD. History of CVA in 2023.    Receives Help From: Family, Personal care attendant (per chart review, recently approved for caregivers for 4 hours/day x 7 days/week), ADL Assistance: Needs assistance (per chart review, cousin assists with bathing and dressing), Ambulation Assistance: Independent, Transfer Assistance: Independent, Active : No     HOME SUPPORT: Patient lived with cousin to provide

## 2024-06-24 NOTE — PROGRESS NOTES
Comprehensive Nutrition Assessment    Type and Reason for Visit:  Reassess    Nutrition Recommendations/Plan:   Continue current diet  Continue daily thiamine  Please document % meals consumed in flowsheet I/O's under intake      Malnutrition Assessment:  Malnutrition Status:  No malnutrition (06/24/24 1123)        Nutrition Assessment:     Chart reviewed and case discussed during IDR. Pt is s/p extubation and moved out of the CCU. She is seen with tele sitter and PCT at bedside being impulsive and insisting on walking into the hallway. N/V reported over the weekend but pt reports this has improved and she ate most of her breakfast this morning. She indicates her appetite is doing well now and she had been gaining weight PTA d/t \"eating everything in sight.\" Will continue monitoring.     Patient Vitals for the past 120 hrs:   PO Meals Eaten (%)   06/22/24 1740 1 - 25%     Wt Readings from Last 5 Encounters:   06/23/24 60.3 kg (132 lb 14.4 oz)   06/08/24 59 kg (130 lb 1.1 oz)   06/05/24 59 kg (130 lb)   05/25/24 58.6 kg (129 lb 3 oz)   05/17/24 56.8 kg (125 lb 3.5 oz)   ]    Nutrition Related Findings:    Labs: K 3.1, Hgb 6.8.   Meds: lipitor, folic acid, venofer, prevacid, solu-medrol, zosyn, klor-con, KCl, thiamine.   BM 6/23.   Wound Type: None       Current Nutrition Intake & Therapies:    Average Meal Intake: 51-75%  Average Supplements Intake: None Ordered  ADULT DIET; Regular    Anthropometric Measures:  Height: 149.9 cm (4' 11\")  Ideal Body Weight (IBW): 95 lbs (43 kg)       Current Body Weight: 60.3 kg (132 lb 15 oz), 140.4 % IBW. Weight Source: Bed Scale  Current BMI (kg/m2): 26.8                          BMI Categories: Normal Weight (BMI 22.0 to 24.9) age over 65    Estimated Daily Nutrient Needs:  Energy Requirements Based On: Formula  Weight Used for Energy Requirements: Current  Energy (kcal/day): 1390 kcals (BMR x 1.3AF)  Weight Used for Protein Requirements: Current  Protein (g/day): 48-60g

## 2024-06-24 NOTE — PROGRESS NOTES
ADULT PROTOCOL: JET AEROSOL ASSESSMENT    Patient  Li Casanova     63 y.o.   female     6/24/2024  9:06 AM    Breath Sounds Pre Procedure: Breath Sounds Pre-Tx VANESSA: Expiratory wheezes                                  Breath Sounds Pre-Tx LLL: Expiratory wheezes        Breath Sounds Pre-Tx RUL: Expiratory wheezes        Breath Sounds Pre-Tx RML: Expiratory wheezes        Breath Sounds Pre-Tx RLL: Expiratory wheezes  Breath Sounds Post Procedure: Breath Sounds Post-Tx VANESSA: Expiratory wheezes          Breath Sounds Post-Tx LLL: Expiratory wheezes          Breath Sounds Post-Tx RUL: Expiratory wheezes          Breath Sounds Post-Tx RML: Expiratory wheezes          Breath Sounds Post-Tx RLL: Expiratory wheezes                                     Heart Rate: Pre procedure Pre-Tx Pulse: 100           Post procedure Post-Tx Pulse: 104    Resp Rate: Pre procedure Pre-Tx Resps: 20           Post procedure Post-Tx Resps: 24    Peak Flow: Pre bronchodilator  Peak Flow: 95           Oxygen: O2 Therapy: Oxygen humidified   nasal cannula     Changed: No    SpO2:  SpO2: 95 %   with Oxygen                Nebulizer Therapy: Current medications Medications: Budesonide, Arformoterol      Changed: No    Smoking History:   Tobacco Use    Smoking status: Every Day       Current packs/day: 1.00       Types: Cigarettes    Smokeless tobacco: Never       Problem List:   Patient Active Problem List   Diagnosis    NSTEMI (non-ST elevated myocardial infarction) ([facility])    COPD (chronic obstructive pulmonary disease) ([facility])    Acute exacerbation of chronic obstructive pulmonary disease (COPD) ([facility])       Respiratory Therapist: Eduardo Dial, RT

## 2024-06-24 NOTE — PLAN OF CARE
Problem: Occupational Therapy - Adult  Goal: By Discharge: Performs self-care activities at highest level of function for planned discharge setting.  See evaluation for individualized goals.  Description: FUNCTIONAL STATUS PRIOR TO ADMISSION:  Patient requires assistance for ADLs, ambulates without AD. History of CVA in 2023.    Receives Help From: Family, Personal care attendant (per chart review, recently approved for caregivers for 4 hours/day x 7 days/week), ADL Assistance: Needs assistance (per chart review, cousin assists with bathing and dressing), Ambulation Assistance: Independent, Transfer Assistance: Independent, Active : No     HOME SUPPORT: Patient lived with cousin to provide assistance for ADLs.    Occupational Therapy Goals:  Initiated 6/24/2024  1.  Patient will perform grooming with Contact Guard Assist within 7 day(s).  2.  Patient will perform upper body dressing with Minimal Assist within 7 day(s).  3.  Patient will perform lower body dressing with Moderate Assist within 7 day(s).  4.  Patient will perform toilet transfers with Contact Guard Assist  within 7 day(s).  5.  Patient will perform all aspects of toileting with Moderate Assist within 7 day(s).  6.  Patient will participate in upper extremity therapeutic exercise/activities with Contact Guard Assist for 5 minutes within 7 day(s).    7.  Patient will utilize energy conservation techniques during functional activities with verbal, visual, and tactile cues within 7 day(s).    Outcome: Not Progressing     OCCUPATIONAL THERAPY EVALUATION    Patient: Li Casanova (63 y.o. female)  Date: 6/24/2024  Primary Diagnosis: Shortness of breath [R06.02]  Acute exacerbation of chronic obstructive pulmonary disease (COPD) ([facility]) [J44.1]  COPD exacerbation ([facility]) [J44.1]         Precautions: Fall Risk, Seizure                  ASSESSMENT :  The patient is limited by decreased functional mobility, independence in ADLs, high-level IADLs, strength,  17

## 2024-06-24 NOTE — PLAN OF CARE
Problem: Physical Therapy - Adult  Goal: By Discharge: Performs mobility at highest level of function for planned discharge setting.  See evaluation for individualized goals.  Description: FUNCTIONAL STATUS PRIOR TO ADMISSION: Patient was modified independent without AD for functional mobility.  Patient is not a reliable historian, but reports she ambulated household distances without assistance and denies falls.  Has history of daily drinking and positive for cocaine on admission.      HOME SUPPORT PRIOR TO ADMISSION: The patient lived with family who can assist as needed.    Physical Therapy Goals  Initiated 6/24/2024  1.  Patient will move from supine to sit and sit to supine in bed with independence within 7 day(s).    2.  Patient will perform sit to stand with supervision/set-up within 7 day(s).  3.  Patient will transfer from bed to chair and chair to bed with supervision/set-up using the least restrictive device within 7 day(s).  4.  Patient will ambulate with supervision/set-up for 150 feet with the least restrictive device within 7 day(s).   5.  Patient will ascend/descend 3 stairs with  handrail(s) with contact guard assist within 7 day(s).   Outcome: Not Progressing   PHYSICAL THERAPY EVALUATION    Patient: Li Casanova (63 y.o. female)  Date: 6/24/2024  Primary Diagnosis: Shortness of breath [R06.02]  Acute exacerbation of chronic obstructive pulmonary disease (COPD) ([facility]) [J44.1]  COPD exacerbation ([facility]) [J44.1]       Precautions:                        ASSESSMENT :   DEFICITS/IMPAIRMENTS:   The patient is limited by decreased functional mobility, strength, safety awareness, cognition, command following, attention/concentration, coordination, balance, vision/visual deficit.  Patient received  sitting EOB with sitter in room, patient is mildly agitated and repeatedly try to stand.  Ambulated x approx 15 feet around the bed with min assist to correct overt LOB (hand held assist).  After seated rest  Support: Widened  Speed/Karin: Pace decreased (< 100 feet/min)  Step Length: Left shortened;Right shortened  Gait Abnormalities: Ataxic;Trunk sway increased                                                                                                                                                                                                                                                              [facility] AM-PAC®      Basic Mobility Inpatient Short Form (6-Clicks) Version 2  How much HELP from another person do you currently need... (If the patient hasn't done an activity recently, how much help from another person do you think they would need if they tried?) Total A Lot A Little None   1.  Turning from your back to your side while in a flat bed without using bedrails? []  1 []  2 []  3  [x]  4   2.  Moving from lying on your back to sitting on the side of a flat bed without using bedrails? []  1 []  2 []  3  [x]  4   3.  Moving to and from a bed to a chair (including a wheelchair)? []  1 []  2 [x]  3  []  4   4. Standing up from a chair using your arms (e.g. wheelchair or bedside chair)? []  1 []  2 [x]  3  []  4   5.  Walking in hospital room? []  1 []  2 [x]  3  []  4   6.  Climbing 3-5 steps with a railing? []  1 [x]  2 []  3  []  4     Raw Score: 19/24                            Cutoff score ?171,2,3 had higher odds of discharging home with home health or need of SNF/IPR.    1. Marta Motley, Fina Acosta, Edward Ochoa, Dea Sifuentes, Eduardo Maria, Jose Alejandro Moltey.  Validity of the AM-PAC “6-Clicks” Inpatient Daily Activity and Basic Mobility Short Forms. Physical Therapy Mar 2014, 94 (3) 379-391; DOI: 10.2522/ptj.95138763  2. Dragna PATRICIA, Ileana EAST, Vivian EAST, René EAST. Association of AM-PAC \"6-Clicks\" Basic Mobility and Daily Activity Scores With Discharge Destination. Phys Ther. 2021 Apr 4;101(4):rbra146. doi: 10.1093/ptj/brax421. PMID: 19049433.  3. Stephani EAST,

## 2024-06-25 LAB
ANION GAP SERPL CALC-SCNC: 6 MMOL/L (ref 5–15)
BASOPHILS # BLD: 0 K/UL (ref 0–0.1)
BASOPHILS NFR BLD: 0 % (ref 0–1)
BUN SERPL-MCNC: 14 MG/DL (ref 6–20)
BUN/CREAT SERPL: 20 (ref 12–20)
CALCIUM SERPL-MCNC: 9.7 MG/DL (ref 8.5–10.1)
CHLORIDE SERPL-SCNC: 110 MMOL/L (ref 97–108)
CO2 SERPL-SCNC: 27 MMOL/L (ref 21–32)
CREAT SERPL-MCNC: 0.69 MG/DL (ref 0.55–1.02)
DIFFERENTIAL METHOD BLD: ABNORMAL
EOSINOPHIL # BLD: 0.1 K/UL (ref 0–0.4)
EOSINOPHIL NFR BLD: 1 % (ref 0–7)
ERYTHROCYTE [DISTWIDTH] IN BLOOD BY AUTOMATED COUNT: 20.8 % (ref 11.5–14.5)
GLUCOSE SERPL-MCNC: 97 MG/DL (ref 65–100)
HCT VFR BLD AUTO: 21.1 % (ref 35–47)
HGB BLD-MCNC: 6.5 G/DL (ref 11.5–16)
HISTORY CHECK: NORMAL
IMM GRANULOCYTES # BLD AUTO: 0 K/UL (ref 0–0.04)
IMM GRANULOCYTES NFR BLD AUTO: 0 % (ref 0–0.5)
LYMPHOCYTES # BLD: 3.7 K/UL (ref 0.8–3.5)
LYMPHOCYTES NFR BLD: 26 % (ref 12–49)
MCH RBC QN AUTO: 26.9 PG (ref 26–34)
MCHC RBC AUTO-ENTMCNC: 30.8 G/DL (ref 30–36.5)
MCV RBC AUTO: 87.2 FL (ref 80–99)
MONOCYTES # BLD: 1.1 K/UL (ref 0–1)
MONOCYTES NFR BLD: 8 % (ref 5–13)
MYELOCYTES NFR BLD MANUAL: 1 %
NEUTS BAND NFR BLD MANUAL: 1 %
NEUTS SEG # BLD: 9 K/UL (ref 1.8–8)
NEUTS SEG NFR BLD: 63 % (ref 32–75)
NRBC # BLD: 0.1 K/UL (ref 0–0.01)
NRBC BLD-RTO: 0.7 PER 100 WBC
PLATELET # BLD AUTO: 319 K/UL (ref 150–400)
PMV BLD AUTO: 10.2 FL (ref 8.9–12.9)
POTASSIUM SERPL-SCNC: 3.4 MMOL/L (ref 3.5–5.1)
RBC # BLD AUTO: 2.42 M/UL (ref 3.8–5.2)
RBC MORPH BLD: ABNORMAL
RBC MORPH BLD: ABNORMAL
SODIUM SERPL-SCNC: 143 MMOL/L (ref 136–145)
WBC # BLD AUTO: 14.1 K/UL (ref 3.6–11)

## 2024-06-25 PROCEDURE — 6360000002 HC RX W HCPCS: Performed by: STUDENT IN AN ORGANIZED HEALTH CARE EDUCATION/TRAINING PROGRAM

## 2024-06-25 PROCEDURE — 85025 COMPLETE CBC W/AUTO DIFF WBC: CPT

## 2024-06-25 PROCEDURE — 2580000003 HC RX 258: Performed by: STUDENT IN AN ORGANIZED HEALTH CARE EDUCATION/TRAINING PROGRAM

## 2024-06-25 PROCEDURE — 6370000000 HC RX 637 (ALT 250 FOR IP): Performed by: STUDENT IN AN ORGANIZED HEALTH CARE EDUCATION/TRAINING PROGRAM

## 2024-06-25 PROCEDURE — 2700000000 HC OXYGEN THERAPY PER DAY

## 2024-06-25 PROCEDURE — 94640 AIRWAY INHALATION TREATMENT: CPT

## 2024-06-25 PROCEDURE — 2580000003 HC RX 258: Performed by: GENERAL ACUTE CARE HOSPITAL

## 2024-06-25 PROCEDURE — 6370000000 HC RX 637 (ALT 250 FOR IP): Performed by: GENERAL ACUTE CARE HOSPITAL

## 2024-06-25 PROCEDURE — 36415 COLL VENOUS BLD VENIPUNCTURE: CPT

## 2024-06-25 PROCEDURE — 36430 TRANSFUSION BLD/BLD COMPNT: CPT

## 2024-06-25 PROCEDURE — 6360000002 HC RX W HCPCS: Performed by: GENERAL ACUTE CARE HOSPITAL

## 2024-06-25 PROCEDURE — 1100000003 HC PRIVATE W/ TELEMETRY

## 2024-06-25 PROCEDURE — 6360000002 HC RX W HCPCS: Performed by: HOSPITALIST

## 2024-06-25 PROCEDURE — 80048 BASIC METABOLIC PNL TOTAL CA: CPT

## 2024-06-25 PROCEDURE — 6370000000 HC RX 637 (ALT 250 FOR IP): Performed by: NURSE PRACTITIONER

## 2024-06-25 PROCEDURE — P9040 RBC LEUKOREDUCED IRRADIATED: HCPCS

## 2024-06-25 PROCEDURE — 30233N1 TRANSFUSION OF NONAUTOLOGOUS RED BLOOD CELLS INTO PERIPHERAL VEIN, PERCUTANEOUS APPROACH: ICD-10-PCS | Performed by: INTERNAL MEDICINE

## 2024-06-25 PROCEDURE — 2580000003 HC RX 258: Performed by: HOSPITALIST

## 2024-06-25 PROCEDURE — 6370000000 HC RX 637 (ALT 250 FOR IP): Performed by: HOSPITALIST

## 2024-06-25 PROCEDURE — P9016 RBC LEUKOCYTES REDUCED: HCPCS

## 2024-06-25 PROCEDURE — 6370000000 HC RX 637 (ALT 250 FOR IP): Performed by: INTERNAL MEDICINE

## 2024-06-25 RX ORDER — SODIUM CHLORIDE 9 MG/ML
INJECTION, SOLUTION INTRAVENOUS PRN
Status: DISCONTINUED | OUTPATIENT
Start: 2024-06-25 | End: 2024-06-29 | Stop reason: HOSPADM

## 2024-06-25 RX ORDER — POTASSIUM CHLORIDE 7.45 MG/ML
10 INJECTION INTRAVENOUS
Status: DISCONTINUED | OUTPATIENT
Start: 2024-06-25 | End: 2024-06-25

## 2024-06-25 RX ORDER — GUAIFENESIN 600 MG/1
600 TABLET, EXTENDED RELEASE ORAL 2 TIMES DAILY
Status: DISCONTINUED | OUTPATIENT
Start: 2024-06-25 | End: 2024-06-29 | Stop reason: HOSPADM

## 2024-06-25 RX ORDER — POTASSIUM CHLORIDE 750 MG/1
40 TABLET, FILM COATED, EXTENDED RELEASE ORAL ONCE
Status: COMPLETED | OUTPATIENT
Start: 2024-06-25 | End: 2024-06-25

## 2024-06-25 RX ADMIN — SODIUM CHLORIDE, PRESERVATIVE FREE 10 ML: 5 INJECTION INTRAVENOUS at 08:36

## 2024-06-25 RX ADMIN — IRON SUCROSE 200 MG: 20 INJECTION, SOLUTION INTRAVENOUS at 14:38

## 2024-06-25 RX ADMIN — FOLIC ACID 1 MG: 1 TABLET ORAL at 08:35

## 2024-06-25 RX ADMIN — AMLODIPINE BESYLATE 10 MG: 5 TABLET ORAL at 08:35

## 2024-06-25 RX ADMIN — SODIUM CHLORIDE, PRESERVATIVE FREE 10 ML: 5 INJECTION INTRAVENOUS at 20:17

## 2024-06-25 RX ADMIN — ARFORMOTEROL TARTRATE 15 MCG: 15 SOLUTION RESPIRATORY (INHALATION) at 07:45

## 2024-06-25 RX ADMIN — BUDESONIDE 250 MCG: 0.25 INHALANT RESPIRATORY (INHALATION) at 21:35

## 2024-06-25 RX ADMIN — SERTRALINE 50 MG: 50 TABLET, FILM COATED ORAL at 08:35

## 2024-06-25 RX ADMIN — SODIUM CHLORIDE, PRESERVATIVE FREE 10 ML: 5 INJECTION INTRAVENOUS at 08:37

## 2024-06-25 RX ADMIN — LANSOPRAZOLE 30 MG: 30 TABLET, ORALLY DISINTEGRATING, DELAYED RELEASE ORAL at 08:35

## 2024-06-25 RX ADMIN — PIPERACILLIN AND TAZOBACTAM 3375 MG: 3; .375 INJECTION, POWDER, LYOPHILIZED, FOR SOLUTION INTRAVENOUS at 04:52

## 2024-06-25 RX ADMIN — BUDESONIDE 250 MCG: 0.25 INHALANT RESPIRATORY (INHALATION) at 07:45

## 2024-06-25 RX ADMIN — SODIUM CHLORIDE: 9 INJECTION, SOLUTION INTRAVENOUS at 20:15

## 2024-06-25 RX ADMIN — POTASSIUM CHLORIDE 40 MEQ: 750 TABLET, FILM COATED, EXTENDED RELEASE ORAL at 10:21

## 2024-06-25 RX ADMIN — ARFORMOTEROL TARTRATE 15 MCG: 15 SOLUTION RESPIRATORY (INHALATION) at 21:35

## 2024-06-25 RX ADMIN — QUETIAPINE FUMARATE 50 MG: 25 TABLET ORAL at 08:34

## 2024-06-25 RX ADMIN — SODIUM CHLORIDE, PRESERVATIVE FREE 10 ML: 5 INJECTION INTRAVENOUS at 20:11

## 2024-06-25 RX ADMIN — Medication 1 AMPULE: at 08:36

## 2024-06-25 RX ADMIN — IPRATROPIUM BROMIDE AND ALBUTEROL SULFATE 1 DOSE: .5; 3 SOLUTION RESPIRATORY (INHALATION) at 21:35

## 2024-06-25 RX ADMIN — ATORVASTATIN CALCIUM 20 MG: 20 TABLET, FILM COATED ORAL at 08:35

## 2024-06-25 RX ADMIN — IPRATROPIUM BROMIDE AND ALBUTEROL SULFATE 1 DOSE: .5; 3 SOLUTION RESPIRATORY (INHALATION) at 07:44

## 2024-06-25 RX ADMIN — SODIUM CHLORIDE: 9 INJECTION, SOLUTION INTRAVENOUS at 20:23

## 2024-06-25 RX ADMIN — PIPERACILLIN AND TAZOBACTAM 3375 MG: 3; .375 INJECTION, POWDER, LYOPHILIZED, FOR SOLUTION INTRAVENOUS at 20:16

## 2024-06-25 RX ADMIN — POTASSIUM CHLORIDE 10 MEQ: 7.46 INJECTION, SOLUTION INTRAVENOUS at 09:15

## 2024-06-25 RX ADMIN — PIPERACILLIN AND TAZOBACTAM 3375 MG: 3; .375 INJECTION, POWDER, LYOPHILIZED, FOR SOLUTION INTRAVENOUS at 11:28

## 2024-06-25 RX ADMIN — IPRATROPIUM BROMIDE AND ALBUTEROL SULFATE 1 DOSE: .5; 3 SOLUTION RESPIRATORY (INHALATION) at 15:50

## 2024-06-25 RX ADMIN — Medication 1 AMPULE: at 20:23

## 2024-06-25 RX ADMIN — GUAIFENESIN 600 MG: 600 TABLET, EXTENDED RELEASE ORAL at 20:11

## 2024-06-25 RX ADMIN — SODIUM CHLORIDE: 9 INJECTION, SOLUTION INTRAVENOUS at 04:53

## 2024-06-25 RX ADMIN — QUETIAPINE FUMARATE 50 MG: 25 TABLET ORAL at 20:11

## 2024-06-25 RX ADMIN — 0.12% CHLORHEXIDINE GLUCONATE 15 ML: 1.2 RINSE ORAL at 08:36

## 2024-06-25 RX ADMIN — 0.12% CHLORHEXIDINE GLUCONATE 15 ML: 1.2 RINSE ORAL at 20:11

## 2024-06-25 RX ADMIN — SODIUM CHLORIDE: 9 INJECTION, SOLUTION INTRAVENOUS at 09:14

## 2024-06-25 RX ADMIN — WATER 40 MG: 1 INJECTION INTRAMUSCULAR; INTRAVENOUS; SUBCUTANEOUS at 08:36

## 2024-06-25 RX ADMIN — Medication 100 MG: at 08:35

## 2024-06-25 RX ADMIN — IPRATROPIUM BROMIDE AND ALBUTEROL SULFATE 1 DOSE: .5; 3 SOLUTION RESPIRATORY (INHALATION) at 11:31

## 2024-06-25 ASSESSMENT — PAIN SCALES - GENERAL
PAINLEVEL_OUTOF10: 0

## 2024-06-25 NOTE — PROGRESS NOTES
Occupational Therapy  6/25/2024    Chart reviewed and up to date, noted patient with HgB 6.5 with plans for blood transfusion. Will defer and follow up as able and appropriate.    Thank you,  SANJUANA Samainego, OTR/L

## 2024-06-25 NOTE — PROGRESS NOTES
0745: Patient is confused, impulsive and agitated. Continues to make attempts to get out of bed and leave. Avasure ordered and placed in room. MD notified.     0902: One dose of Ativan ordered and given per MD.    0815: patient refusing all oral meds. MD notified.     0930: Patient continues to be impulsive and get out of bed. She is refusing all care. Sitter placed at bedside. MD notified and said he will come to bedside.     1100: CIWA protocol initiated by attending Ced. Ativan given per protocol.     1200: Patient resting in bed. Bed alarm in place. Sitter is in room.

## 2024-06-25 NOTE — PROGRESS NOTES
Hospitalist Progress Note    NAME:   Li Casanova   : 1960   MRN: [id number]     Date/Time: 2024    Patient PCP: Star Napier MD    Icu course :  62 y/o female PMHx of CAD, HTN, CVA  COPD/asthma (on chronic home 2L) chronic tobacco use disorder and polysubstance abuse use admitted () by hospital medicine for COPD exacerbation after presenting with acute onset of shortness of breath.  CTA chest-negative for PE.  She was started on systemic steroids and bronchodilators.  Per reports she has a history of daily alcohol use as well as history of polysubstance abuse.  Her UDS was positive for cocaine.  Rapid response was called due to tachycardia (140s), worsening tachypnea with RR in the 40s with diffuse wheezing throughout as well as increased agitation. Patient was transferred to the ICU, started on Precedex infusion, phenobarbital taper and diltiazem infusion.      : resting comfortably on the ventilator  : deeply sedated  : Failed SAT/SBT this morning, currently on Precedex 1.2, propofol 40, on phenobarbital taper and started on scheduled Seroquel . Triglycerides 462, re-check in AM, may need to stop propofol Continue to assess mentation for appropriateness for SAT/SBT, if fails cuff leak, will need to consider early trach.  : Patient remains intubated and sedated. Intermittently very agitated when sedation weaning is attempted, has copious oral secretion   : patient was taken off ventilator and et tube removed and transferred to floors .     Assessment / Plan:      Acute hypoxic/hypercapnic respiratory failure requiring emergent intubation - in the setting of COPD/asthma exacerbation, with inability to protect airways in the setting tox metabolic encephalopathy due to illicit drug ingestion/withdrawal. (Of note small glottic opening, INTUBATED WITH 7.0 ETT )  CTA chest-leg PE, emphysema with extensive peripheral scarring and subsegmental atelectasis, pulmonary  MD Sade, 40 mg at 06/24/24 0817    labetalol (NORMODYNE;TRANDATE) injection syringe 10 mg, 10 mg, IntraVENous, Q4H PRN, Stephany Wylie APRN - NP, 10 mg at 06/22/24 1820    QUEtiapine (SEROQUEL) tablet 50 mg, 50 mg, Oral, BID, Jorge Jacome MD, 50 mg at 06/24/24 0816    amLODIPine (NORVASC) tablet 10 mg, 10 mg, Oral, Daily, Jorge Jacome MD, 10 mg at 06/23/24 0902    alcohol 62% (NOZIN) nasal  1 ampule, 1 ampule, Nasal, Q12H, Jorge Jacome MD, 1 ampule at 06/24/24 1930    chlorhexidine (PERIDEX) 0.12 % solution 15 mL, 15 mL, Mouth/Throat, BID, Jorge Jacome MD, 15 mL at 06/24/24 0817    sertraline (ZOLOFT) tablet 50 mg, 50 mg, Orogastric, Daily, Jorge Jacome MD, 50 mg at 06/24/24 0817    [Held by provider] hydroCHLOROthiazide (HYDRODIURIL) tablet 25 mg, 25 mg, Oral, Daily, Nupur Ladd MD    sodium chloride flush 0.9 % injection 5-40 mL, 5-40 mL, IntraVENous, 2 times per day, Nupur Ladd MD, 10 mL at 06/24/24 1927    sodium chloride flush 0.9 % injection 5-40 mL, 5-40 mL, IntraVENous, PRN, Nupur Ladd MD    [Held by provider] enoxaparin (LOVENOX) injection 40 mg, 40 mg, SubCUTAneous, Daily, Nupur Ladd MD, 40 mg at 06/23/24 0903    ondansetron (ZOFRAN-ODT) disintegrating tablet 4 mg, 4 mg, Oral, Q8H PRN **OR** ondansetron (ZOFRAN) injection 4 mg, 4 mg, IntraVENous, Q6H PRN, Nupur Ladd MD, 4 mg at 06/21/24 2201    polyethylene glycol (GLYCOLAX) packet 17 g, 17 g, Oral, Daily PRN, Nupur Ladd MD    acetaminophen (TYLENOL) tablet 650 mg, 650 mg, Oral, Q6H PRN **OR** acetaminophen (TYLENOL) suppository 650 mg, 650 mg, Rectal, Q6H PRN, Nupur Ladd MD    budesonide (PULMICORT) nebulizer suspension 250 mcg, 0.25 mg, Nebulization, BID RT, 250 mcg at 06/25/24 0745 **AND** arformoterol tartrate (BROVANA) nebulizer solution 15 mcg, 15 mcg, Nebulization, BID RT, Nupur Ladd MD, 15 mcg at 06/25/24 0745    levalbuterol

## 2024-06-25 NOTE — PROGRESS NOTES
0700: Bedside and Verbal shift change report given to CHANDANA Peraza (oncoming nurse) by CHANDANA Anne (offgoing nurse). Report included the following information Nurse Handoff Report, Index, Intake/Output, MAR, and Recent Results.     1900: Bedside and Verbal shift change report given to CHANDANA Shen (oncoming nurse) by CHANDANA Peraza (offgoing nurse). Report included the following information Nurse Handoff Report, Index, Intake/Output, MAR, and Recent Results.

## 2024-06-25 NOTE — PROGRESS NOTES
This patient might need Blood and blood product transfusion during this episode of hospital care for above clinical issues     The indications, risk and benefits were discussed with:     Patient   Patient's family Amaya Freire , #[phone]    Other      The above mentioned parties agreed with the possible transfusion of blood or blood products. In case such need arise, nursing staff will have my permission to obtain written authorization of such transfusion.        Rasheeda Coughlin MD

## 2024-06-25 NOTE — PROGRESS NOTES
Physical Therapy    Chart reviewed, patient with Hgb 6.5 and pending transfusion, will defer and continue to follow.    OK PlunkettT

## 2024-06-25 NOTE — CONSENT
Informed Consent for Blood Component Transfusion Note    I have discussed with the  Cousin Amaya Freire , #[phone],  the rationale for blood component transfusion; its benefits in treating or preventing fatigue, organ damage, or death; and its risk which includes mild transfusion reactions, rare risk of blood borne infection, or more serious but rare reactions. I have discussed the alternatives to transfusion, including the risk and consequences of not receiving transfusion. The  cousin Amaya Freire , #[phone],  had an opportunity to ask questions and had agreed to proceed with transfusion of blood components.    Electronically signed by Rasheeda Coughlin MD on 6/25/24 at 8:28 AM EDT

## 2024-06-25 NOTE — PROGRESS NOTES
Hospitalist Progress Note    NAME:   Li Casanova   : 1960   MRN: [id number]     Date/Time: 2024    Patient PCP: Star Napier MD    Icu course :  64 y/o female PMHx of CAD, HTN, CVA  COPD/asthma (on chronic home 2L) chronic tobacco use disorder and polysubstance abuse use admitted () by hospital medicine for COPD exacerbation after presenting with acute onset of shortness of breath.  CTA chest-negative for PE.  She was started on systemic steroids and bronchodilators.  Per reports she has a history of daily alcohol use as well as history of polysubstance abuse.  Her UDS was positive for cocaine.  Rapid response was called due to tachycardia (140s), worsening tachypnea with RR in the 40s with diffuse wheezing throughout as well as increased agitation. Patient was transferred to the ICU, started on Precedex infusion, phenobarbital taper and diltiazem infusion.      : resting comfortably on the ventilator  : deeply sedated  : Failed SAT/SBT this morning, currently on Precedex 1.2, propofol 40, on phenobarbital taper and started on scheduled Seroquel . Triglycerides 462, re-check in AM, may need to stop propofol Continue to assess mentation for appropriateness for SAT/SBT, if fails cuff leak, will need to consider early trach.  : Patient remains intubated and sedated. Intermittently very agitated when sedation weaning is attempted, has copious oral secretion   : patient was taken off ventilator and et tube removed and transferred to floors .     Assessment / Plan:      Acute hypoxic/hypercapnic respiratory failure requiring emergent intubation - in the setting of COPD/asthma exacerbation, with inability to protect airways in the setting tox metabolic encephalopathy due to illicit drug ingestion/withdrawal. (Of note small glottic opening, INTUBATED WITH 7.0 ETT )  CTA chest-leg PE, emphysema with extensive peripheral scarring and subsegmental atelectasis, pulmonary  **OR** dextrose bolus 10% 250 mL, 250 mL, IntraVENous, PRN, Heavenrkaur, Stephany B, APRN - NP    glucagon injection 1 mg, 1 mg, SubCUTAneous, PRN, Heavenridge, Stephany B, APRN - NP    dextrose 10 % infusion, , IntraVENous, Continuous PRN, Heavenridge, Stephany B, APRN - NP    clopidogrel (PLAVIX) tablet 75 mg, 75 mg, Orogastric, Daily, Heavenridge, Stephany B, APRN - NP, 75 mg at 06/23/24 0901    aspirin chewable tablet 81 mg, 81 mg, Orogastric, Daily, Heavenridge, Stephany B, APRN - NP, 81 mg at 06/23/24 0901    atorvastatin (LIPITOR) tablet 20 mg, 20 mg, Orogastric, Daily, Heavenridge, Stephany B, APRN - NP, 20 mg at 06/24/24 0816       LABS:    I reviewed today's most current labs and imaging studies.    Pertinent labs include:  Recent Labs     06/23/24  0151 06/24/24  0441 06/24/24  1410   WBC 13.8* 14.3* 14.8*   HGB 7.4* 6.8* 6.4*   HCT 25.2* 23.3* 21.0*    338 314       Recent Labs     06/22/24  0420 06/23/24  0151 06/24/24  0441    142 143   K 3.8 3.2* 3.1*    109* 110*   CO2 29 28 26   GLUCOSE 93 102* 121*   BUN 24* 20 16   CREATININE 0.71 0.73 0.66   CALCIUM 10.2* 9.4 9.9   MG 2.6*  --  2.2   PHOS 3.4  --   --        Xray Result (most recent):  XR CHEST PORTABLE  Narrative: INDICATION:  Acute respiratory failure    EXAM: CXR Portable.    FINDINGS: Portable chest shows interval extubation and NG tube removal since  6/20/2024. There is no apparent pneumothorax.  Lungs show improved aeration with  mild residual left base atelectasis. Heart size is normal. There is no pulmonary  edema.  Impression: Improved aeration. Mild residual left base atelectasis.    Electronically signed by DIMITRIOS FRANKLIN     Microbiology:  Results       Procedure Component Value Units Date/Time    Culture, Respiratory [[id number]]     Order Status: Sent Specimen: Sputum Expectorated     Culture, Blood 1 [[id number]] Collected: 06/22/24 1522    Order Status: Completed Specimen: Blood Updated: 06/24/24 0647     Special Requests

## 2024-06-25 NOTE — PLAN OF CARE
Problem: Discharge Planning  Goal: Discharge to home or other facility with appropriate resources  Outcome: Progressing     Problem: Skin/Tissue Integrity  Goal: Absence of new skin breakdown  Description: 1.  Monitor for areas of redness and/or skin breakdown  2.  Assess vascular access sites hourly  3.  Every 4-6 hours minimum:  Change oxygen saturation probe site  4.  Every 4-6 hours:  If on nasal continuous positive airway pressure, respiratory therapy assess nares and determine need for appliance change or resting period.  Outcome: Progressing     Problem: Safety - Adult  Goal: Free from fall injury  Outcome: Progressing     Problem: Respiratory - Adult  Goal: Achieves optimal ventilation and oxygenation  6/25/2024 1015 by Karmen Cohen, RN  Outcome: Progressing  6/25/2024 0909 by Radha Garrett, RT  Outcome: Progressing  6/24/2024 2107 by Dian Marie RCP  Outcome: Progressing     Problem: Pain  Goal: Verbalizes/displays adequate comfort level or baseline comfort level  Outcome: Progressing     Problem: Chronic Conditions and Co-morbidities  Goal: Patient's chronic conditions and co-morbidity symptoms are monitored and maintained or improved  Outcome: Progressing  Flowsheets (Taken 6/24/2024 2305 by Dayana Gtz, RN)  Care Plan - Patient's Chronic Conditions and Co-Morbidity Symptoms are Monitored and Maintained or Improved: Monitor and assess patient's chronic conditions and comorbid symptoms for stability, deterioration, or improvement     Problem: Nutrition Deficit:  Goal: Optimize nutritional status  Outcome: Progressing     Problem: ABCDS Injury Assessment  Goal: Absence of physical injury  Outcome: Progressing     Problem: Confusion  Goal: Confusion, delirium, dementia, or psychosis is improved or at baseline  Description: INTERVENTIONS:  1. Assess for possible contributors to thought disturbance, including medications, impaired vision or hearing, underlying metabolic abnormalities,

## 2024-06-25 NOTE — PLAN OF CARE
Requiring transfusion today.     Problem: Hematologic - Adult  Goal: Maintains hematologic stability  6/25/2024 1058 by Lilly Thurman, RN  Outcome: Not Progressing  6/25/2024 1015 by Karmen Cohen, RN  Outcome: Progressing

## 2024-06-25 NOTE — CARE COORDINATION
0830 to 0811 -     IFEANYI received message from [facility] that they cannot accept and that [facility] will meet with patient today at bedside.  IFEANYI contacted Amaya (cousin) who is aware of this and is agreeable to any facility that can accept her for IPR.  Amaya is aware that patient may not have coverage for SNF but is aware she may have coverage for LTC, if she qualifies.  Amaya understands that if patient cannot be accepted into a facility, she will need to return home.  [facility] referrals sent out to IPR facilities.    0948 - [facility] does not accept patient's insurance.      1543 to 1547 - W pending, IFEANYI requested update.  [facility], [facility] and [facility] unable to accept at this time.  CM attempted to contact Amaya (cousin), unable to reach anyone, VM left.  CM to look into SNF options for patient.      Phoebe Christianson, BISIN, RN    Care Management  [phone]

## 2024-06-25 NOTE — SIGNIFICANT EVENT
Reviewed CBC from earlier today - persisted anemia 6.8 from 6/24 4:41am, recheck 6.4 at 14:10 today.     Discussed findings with patient including need for blood transfusion. States that she does not want blood transfusion currently, refusing despite need. I discussed with her the possible dangers of not receiving blood transfusion including damage to multiple organ systems and she still declines.     Reviewed chart - does have hx of encephalopathy during this admission. Attempted to call emergency contact on file - no answer to listed phone number.     Will repeat CBC. Consider iron transfusion. Will discuss with daytime hospitalist.     Devyn Engel MD  6/25/2024

## 2024-06-25 NOTE — PROGRESS NOTES
End of Shift Note    Bedside shift change report given to Kamren ELLINGTON (oncoming nurse) by Dayana Gtz RN (offgoing nurse).  Report included the following information SBAR, Kardex, MAR, Recent Results, and Cardiac Rhythm NSR-ST    Shift worked:  [id number]     Shift summary and any significant changes:     none     Concerns for physician to address:  none     Zone phone for oncoming shift:   [phone]       Activity:     Number times ambulated in hallways past shift: 0  Number of times OOB to chair past shift: 1    Cardiac:   Cardiac Monitoring: Yes           Access:  Current line(s): PIV     Genitourinary:   Urinary status: voiding    Respiratory:      Chronic home O2 use?: YES  Incentive spirometer at bedside: NO       GI:     Current diet:  ADULT DIET; Regular  Passing flatus: NO  Tolerating current diet: YES       Pain Management:   Patient states pain is manageable on current regimen: N/A    Skin:     Interventions: PT/OT consult and limit briefs    Patient Safety:  Fall Score:    Interventions: bed/chair alarm, assistive device (walker, cane. etc), gripper socks, pt to call before getting OOB, stay with me (per policy), and sitter at bedside       Length of Stay:  Expected LOS: 10  Actual LOS: 9      Dayana Gtz RN

## 2024-06-26 LAB
ABO + RH BLD: NORMAL
ANION GAP SERPL CALC-SCNC: 3 MMOL/L (ref 5–15)
BLD PROD TYP BPU: NORMAL
BLOOD BANK BLOOD PRODUCT EXPIRATION DATE: NORMAL
BLOOD BANK DISPENSE STATUS: NORMAL
BLOOD BANK ISBT PRODUCT BLOOD TYPE: 9500
BLOOD BANK PRODUCT CODE: NORMAL
BLOOD BANK UNIT TYPE AND RH: NORMAL
BLOOD GROUP ANTIBODIES SERPL: NORMAL
BPU ID: NORMAL
BUN SERPL-MCNC: 11 MG/DL (ref 6–20)
BUN/CREAT SERPL: 16 (ref 12–20)
CALCIUM SERPL-MCNC: 9.4 MG/DL (ref 8.5–10.1)
CHLORIDE SERPL-SCNC: 111 MMOL/L (ref 97–108)
CO2 SERPL-SCNC: 28 MMOL/L (ref 21–32)
CREAT SERPL-MCNC: 0.69 MG/DL (ref 0.55–1.02)
CROSSMATCH RESULT: NORMAL
ERYTHROCYTE [DISTWIDTH] IN BLOOD BY AUTOMATED COUNT: 19.9 % (ref 11.5–14.5)
GLUCOSE SERPL-MCNC: 109 MG/DL (ref 65–100)
HCT VFR BLD AUTO: 24.5 % (ref 35–47)
HGB BLD-MCNC: 7.5 G/DL (ref 11.5–16)
MCH RBC QN AUTO: 27.6 PG (ref 26–34)
MCHC RBC AUTO-ENTMCNC: 30.6 G/DL (ref 30–36.5)
MCV RBC AUTO: 90.1 FL (ref 80–99)
NRBC # BLD: 0.18 K/UL (ref 0–0.01)
NRBC BLD-RTO: 1.2 PER 100 WBC
PLATELET # BLD AUTO: 321 K/UL (ref 150–400)
PMV BLD AUTO: 10.8 FL (ref 8.9–12.9)
POTASSIUM SERPL-SCNC: 3.3 MMOL/L (ref 3.5–5.1)
RBC # BLD AUTO: 2.72 M/UL (ref 3.8–5.2)
SODIUM SERPL-SCNC: 142 MMOL/L (ref 136–145)
SPECIMEN EXP DATE BLD: NORMAL
UNIT DIVISION: 0
UNIT ISSUE DATE/TIME: NORMAL
WBC # BLD AUTO: 15 K/UL (ref 3.6–11)

## 2024-06-26 PROCEDURE — 6370000000 HC RX 637 (ALT 250 FOR IP): Performed by: INTERNAL MEDICINE

## 2024-06-26 PROCEDURE — 6370000000 HC RX 637 (ALT 250 FOR IP): Performed by: STUDENT IN AN ORGANIZED HEALTH CARE EDUCATION/TRAINING PROGRAM

## 2024-06-26 PROCEDURE — 1100000003 HC PRIVATE W/ TELEMETRY

## 2024-06-26 PROCEDURE — 2580000003 HC RX 258: Performed by: GENERAL ACUTE CARE HOSPITAL

## 2024-06-26 PROCEDURE — 6370000000 HC RX 637 (ALT 250 FOR IP): Performed by: HOSPITALIST

## 2024-06-26 PROCEDURE — 36415 COLL VENOUS BLD VENIPUNCTURE: CPT

## 2024-06-26 PROCEDURE — 97116 GAIT TRAINING THERAPY: CPT

## 2024-06-26 PROCEDURE — 97530 THERAPEUTIC ACTIVITIES: CPT

## 2024-06-26 PROCEDURE — 2580000003 HC RX 258: Performed by: HOSPITALIST

## 2024-06-26 PROCEDURE — 2580000003 HC RX 258: Performed by: STUDENT IN AN ORGANIZED HEALTH CARE EDUCATION/TRAINING PROGRAM

## 2024-06-26 PROCEDURE — 6360000002 HC RX W HCPCS: Performed by: HOSPITALIST

## 2024-06-26 PROCEDURE — 6360000002 HC RX W HCPCS: Performed by: STUDENT IN AN ORGANIZED HEALTH CARE EDUCATION/TRAINING PROGRAM

## 2024-06-26 PROCEDURE — 2700000000 HC OXYGEN THERAPY PER DAY

## 2024-06-26 PROCEDURE — 85027 COMPLETE CBC AUTOMATED: CPT

## 2024-06-26 PROCEDURE — 6370000000 HC RX 637 (ALT 250 FOR IP): Performed by: GENERAL ACUTE CARE HOSPITAL

## 2024-06-26 PROCEDURE — 80048 BASIC METABOLIC PNL TOTAL CA: CPT

## 2024-06-26 PROCEDURE — 6370000000 HC RX 637 (ALT 250 FOR IP): Performed by: NURSE PRACTITIONER

## 2024-06-26 PROCEDURE — 6360000002 HC RX W HCPCS: Performed by: GENERAL ACUTE CARE HOSPITAL

## 2024-06-26 PROCEDURE — 94640 AIRWAY INHALATION TREATMENT: CPT

## 2024-06-26 RX ORDER — POTASSIUM CHLORIDE 20 MEQ/1
40 TABLET, EXTENDED RELEASE ORAL ONCE
Status: COMPLETED | OUTPATIENT
Start: 2024-06-26 | End: 2024-06-26

## 2024-06-26 RX ADMIN — Medication 100 MG: at 08:17

## 2024-06-26 RX ADMIN — BUDESONIDE 250 MCG: 0.25 INHALANT RESPIRATORY (INHALATION) at 21:08

## 2024-06-26 RX ADMIN — SODIUM CHLORIDE, PRESERVATIVE FREE 10 ML: 5 INJECTION INTRAVENOUS at 20:13

## 2024-06-26 RX ADMIN — FOLIC ACID 1 MG: 1 TABLET ORAL at 08:17

## 2024-06-26 RX ADMIN — Medication 1 AMPULE: at 20:12

## 2024-06-26 RX ADMIN — GUAIFENESIN 600 MG: 600 TABLET, EXTENDED RELEASE ORAL at 20:12

## 2024-06-26 RX ADMIN — IRON SUCROSE 200 MG: 20 INJECTION, SOLUTION INTRAVENOUS at 13:44

## 2024-06-26 RX ADMIN — ARFORMOTEROL TARTRATE 15 MCG: 15 SOLUTION RESPIRATORY (INHALATION) at 21:08

## 2024-06-26 RX ADMIN — GUAIFENESIN 600 MG: 600 TABLET, EXTENDED RELEASE ORAL at 08:17

## 2024-06-26 RX ADMIN — 0.12% CHLORHEXIDINE GLUCONATE 15 ML: 1.2 RINSE ORAL at 08:17

## 2024-06-26 RX ADMIN — ARFORMOTEROL TARTRATE 15 MCG: 15 SOLUTION RESPIRATORY (INHALATION) at 07:59

## 2024-06-26 RX ADMIN — SODIUM CHLORIDE: 9 INJECTION, SOLUTION INTRAVENOUS at 20:21

## 2024-06-26 RX ADMIN — WATER 40 MG: 1 INJECTION INTRAMUSCULAR; INTRAVENOUS; SUBCUTANEOUS at 08:14

## 2024-06-26 RX ADMIN — BUDESONIDE 250 MCG: 0.25 INHALANT RESPIRATORY (INHALATION) at 07:59

## 2024-06-26 RX ADMIN — POTASSIUM CHLORIDE 40 MEQ: 1500 TABLET, EXTENDED RELEASE ORAL at 09:38

## 2024-06-26 RX ADMIN — PIPERACILLIN AND TAZOBACTAM 3375 MG: 3; .375 INJECTION, POWDER, LYOPHILIZED, FOR SOLUTION INTRAVENOUS at 20:21

## 2024-06-26 RX ADMIN — IPRATROPIUM BROMIDE AND ALBUTEROL SULFATE 1 DOSE: .5; 3 SOLUTION RESPIRATORY (INHALATION) at 15:27

## 2024-06-26 RX ADMIN — Medication 1 AMPULE: at 08:14

## 2024-06-26 RX ADMIN — IPRATROPIUM BROMIDE AND ALBUTEROL SULFATE 1 DOSE: .5; 3 SOLUTION RESPIRATORY (INHALATION) at 11:45

## 2024-06-26 RX ADMIN — QUETIAPINE FUMARATE 50 MG: 25 TABLET ORAL at 08:17

## 2024-06-26 RX ADMIN — SERTRALINE 50 MG: 50 TABLET, FILM COATED ORAL at 08:17

## 2024-06-26 RX ADMIN — SODIUM CHLORIDE, PRESERVATIVE FREE 10 ML: 5 INJECTION INTRAVENOUS at 20:16

## 2024-06-26 RX ADMIN — HYDROXYZINE HYDROCHLORIDE 10 MG: 10 TABLET ORAL at 20:56

## 2024-06-26 RX ADMIN — SODIUM CHLORIDE, PRESERVATIVE FREE 10 ML: 5 INJECTION INTRAVENOUS at 08:18

## 2024-06-26 RX ADMIN — SODIUM CHLORIDE, PRESERVATIVE FREE 10 ML: 5 INJECTION INTRAVENOUS at 08:14

## 2024-06-26 RX ADMIN — PIPERACILLIN AND TAZOBACTAM 3375 MG: 3; .375 INJECTION, POWDER, LYOPHILIZED, FOR SOLUTION INTRAVENOUS at 10:50

## 2024-06-26 RX ADMIN — LANSOPRAZOLE 30 MG: 30 TABLET, ORALLY DISINTEGRATING, DELAYED RELEASE ORAL at 06:07

## 2024-06-26 RX ADMIN — ATORVASTATIN CALCIUM 20 MG: 20 TABLET, FILM COATED ORAL at 08:15

## 2024-06-26 RX ADMIN — IPRATROPIUM BROMIDE AND ALBUTEROL SULFATE 1 DOSE: .5; 3 SOLUTION RESPIRATORY (INHALATION) at 21:07

## 2024-06-26 RX ADMIN — 0.12% CHLORHEXIDINE GLUCONATE 15 ML: 1.2 RINSE ORAL at 20:12

## 2024-06-26 RX ADMIN — IPRATROPIUM BROMIDE AND ALBUTEROL SULFATE 1 DOSE: .5; 3 SOLUTION RESPIRATORY (INHALATION) at 07:59

## 2024-06-26 RX ADMIN — QUETIAPINE FUMARATE 50 MG: 25 TABLET ORAL at 20:12

## 2024-06-26 RX ADMIN — PIPERACILLIN AND TAZOBACTAM 3375 MG: 3; .375 INJECTION, POWDER, LYOPHILIZED, FOR SOLUTION INTRAVENOUS at 04:04

## 2024-06-26 RX ADMIN — SODIUM CHLORIDE: 9 INJECTION, SOLUTION INTRAVENOUS at 04:03

## 2024-06-26 RX ADMIN — AMLODIPINE BESYLATE 10 MG: 5 TABLET ORAL at 08:15

## 2024-06-26 ASSESSMENT — PAIN SCALES - GENERAL
PAINLEVEL_OUTOF10: 0
PAINLEVEL_OUTOF10: 0

## 2024-06-26 NOTE — PROGRESS NOTES
Hospitalist Progress Note    NAME:   Li Casanova   : 1960   MRN: [id number]     Date/Time: 2024    Patient PCP: Star Napier MD    Icu course :  64 y/o female PMHx of CAD, HTN, CVA  COPD/asthma (on chronic home 2L) chronic tobacco use disorder and polysubstance abuse use admitted () by hospital medicine for COPD exacerbation after presenting with acute onset of shortness of breath.  CTA chest-negative for PE.  She was started on systemic steroids and bronchodilators.  Per reports she has a history of daily alcohol use as well as history of polysubstance abuse.  Her UDS was positive for cocaine.  Rapid response was called due to tachycardia (140s), worsening tachypnea with RR in the 40s with diffuse wheezing throughout as well as increased agitation. Patient was transferred to the ICU, started on Precedex infusion, phenobarbital taper and diltiazem infusion.      : resting comfortably on the ventilator  : deeply sedated  : Failed SAT/SBT this morning, currently on Precedex 1.2, propofol 40, on phenobarbital taper and started on scheduled Seroquel . Triglycerides 462, re-check in AM, may need to stop propofol Continue to assess mentation for appropriateness for SAT/SBT, if fails cuff leak, will need to consider early trach.  : Patient remains intubated and sedated. Intermittently very agitated when sedation weaning is attempted, has copious oral secretion   : patient was taken off ventilator and et tube removed and transferred to floors .     Assessment / Plan:      Acute hypoxic/hypercapnic respiratory failure requiring emergent intubation - in the setting of COPD/asthma exacerbation, with inability to protect airways in the setting tox metabolic encephalopathy due to illicit drug ingestion/withdrawal. (Of note small glottic opening, INTUBATED WITH 7.0 ETT )  CTA chest-leg PE, emphysema with extensive peripheral scarring and subsegmental atelectasis, pulmonary

## 2024-06-26 NOTE — PROGRESS NOTES
0700: Bedside and Verbal shift change report given to CHANDANA Peraza (oncoming nurse) by CHANDANA Shen (offgoing nurse). Report included the following information Nurse Handoff Report, Index, Intake/Output, MAR, and Recent Results.     1050: Sitter has been discontinued as patient is calm and cooperative.    1900: Bedside and Verbal shift change report given to CHANDANA Shen (oncoming nurse) by CHANDANA Peraza (offgoing nurse). Report included the following information Nurse Handoff Report, Index, Intake/Output, MAR, and Recent Results.

## 2024-06-26 NOTE — PROGRESS NOTES
Bedside shift change report given to CHANDANA Peraza (oncoming nurse) by  CHANDANA Shen/ CHANDANA Hood(offgoing nurse). Report included the following information Nurse Handoff Report, Index, ED Encounter Summary, ED SBAR, Adult Overview, Surgery Report, Intake/Output, MAR, Recent Results, Med Rec Status, Cardiac Rhythm NSR, and Alarm Parameters.

## 2024-06-26 NOTE — PLAN OF CARE
Problem: Physical Therapy - Adult  Goal: By Discharge: Performs mobility at highest level of function for planned discharge setting.  See evaluation for individualized goals.  Description: FUNCTIONAL STATUS PRIOR TO ADMISSION: Patient was modified independent without AD for functional mobility.  Patient is not a reliable historian, but reports she ambulated household distances without assistance and denies falls.  Has history of daily drinking and positive for cocaine on admission.      HOME SUPPORT PRIOR TO ADMISSION: The patient lived with family who can assist as needed.    Physical Therapy Goals  Initiated 6/24/2024  1.  Patient will move from supine to sit and sit to supine in bed with independence within 7 day(s).    2.  Patient will perform sit to stand with supervision/set-up within 7 day(s).  3.  Patient will transfer from bed to chair and chair to bed with supervision/set-up using the least restrictive device within 7 day(s).  4.  Patient will ambulate with supervision/set-up for 150 feet with the least restrictive device within 7 day(s).   5.  Patient will ascend/descend 3 stairs with  handrail(s) with contact guard assist within 7 day(s).   Outcome: Progressing   PHYSICAL THERAPY TREATMENT    Patient: Li Casanova (63 y.o. female)  Date: 6/26/2024  Diagnosis: Shortness of breath [R06.02]  Acute exacerbation of chronic obstructive pulmonary disease (COPD) (HCC) [J44.1]  COPD exacerbation (HCC) [J44.1] Acute exacerbation of chronic obstructive pulmonary disease (COPD) (HCC)      Precautions: Fall Risk, Seizure                      ASSESSMENT:  Patient continues to benefit from skilled PT services and is slowly progressing towards goals. Patient received in bed and agreeable to participate, calm today and able to follow commands but remains confused.  Came to sit EOB with SBA and extra time, sitting balance is intact.  Patient able to perform exercises in seated position with good tolerance.  Ambulated  with min assist (hand held) x 15 feet but patient still unstable and ataxic, does not attend to task and requires frequent reminders for safety.  After seated rest break used RW to ambulate around room x 30 feet, more steady but needs min assist to steady and with walker management.  Patient fatigued quickly and also appeared drowsy after being up, she declined transferring to chair.  Educated on safety and falls prevention and left in supine with call bell in reach. Continue to recommend rehab, patient is at high risk of falls and below baseline.           PLAN:  Patient continues to benefit from skilled intervention to address the above impairments.  Continue treatment per established plan of care.    Recommend with staff: therapy recommendations for staff: Recommend mobility with staff assist x1 using rolling walker.    Recommend for next PT session: further progression of gait with existing device    Recommendation for discharge: (in order for the patient to meet his/her long term goals): Therapy 3 hours/day 5-7 days/week    Other factors to consider for discharge: patient's current support system is unable to meet their requirements for physical assistance, high risk for falls, not safe to be alone, and concern for safely navigating or managing the home environment    IF patient discharges home will need the following DME: patient owns DME required for discharge       SUBJECTIVE:   Patient stated, \"I need to go back to sleep.\"    OBJECTIVE DATA SUMMARY:   Critical Behavior:          Functional Mobility Training:  Bed Mobility:  Bed Mobility Training  Supine to Sit: Stand-by assistance  Sit to Supine: Stand-by assistance  Scooting: Stand-by assistance  Transfers:  Transfer Training  Interventions: Safety awareness training;Verbal cues  Sit to Stand: Contact-guard assistance  Stand to Sit: Contact-guard assistance  Bed to Chair: Minimum assistance  Balance:  Balance  Sitting: Intact  Standing: Impaired  Standing

## 2024-06-26 NOTE — PLAN OF CARE
Problem: Respiratory - Adult  Goal: Achieves optimal ventilation and oxygenation  6/25/2024 2137 by Dian Marie RCP  Outcome: Progressing  6/25/2024 1015 by Karmen Cohen, RN  Outcome: Progressing  6/25/2024 0909 by Radha Garrett, RT  Outcome: Progressing     Problem: Hematologic - Adult  Goal: Maintains hematologic stability  6/25/2024 1058 by Lilly Thurman RN  Outcome: Not Progressing  6/25/2024 1015 by Karmen Cohen, RN  Outcome: Progressing

## 2024-06-26 NOTE — PLAN OF CARE
Problem: Discharge Planning  Goal: Discharge to home or other facility with appropriate resources  6/26/2024 0926 by Karmen Cohen RN  Outcome: Progressing  6/25/2024 2322 by Sana Avila RN  Outcome: Progressing  Flowsheets (Taken 6/25/2024 1906 by Hermelinda Horvath, RN)  Discharge to home or other facility with appropriate resources: Identify barriers to discharge with patient and caregiver     Problem: Skin/Tissue Integrity  Goal: Absence of new skin breakdown  Description: 1.  Monitor for areas of redness and/or skin breakdown  2.  Assess vascular access sites hourly  3.  Every 4-6 hours minimum:  Change oxygen saturation probe site  4.  Every 4-6 hours:  If on nasal continuous positive airway pressure, respiratory therapy assess nares and determine need for appliance change or resting period.  6/26/2024 0926 by Karmen Cohen RN  Outcome: Progressing  6/25/2024 2322 by Sana Avila RN  Outcome: Progressing     Problem: Safety - Adult  Goal: Free from fall injury  6/26/2024 0926 by Karmen Cohen RN  Outcome: Progressing  6/25/2024 2322 by Sana Avila RN  Outcome: Progressing     Problem: Respiratory - Adult  Goal: Achieves optimal ventilation and oxygenation  6/26/2024 0926 by Karmen Cohen RN  Outcome: Progressing  6/25/2024 2322 by Sana Avila RN  Outcome: Progressing  6/25/2024 2137 by Dian Marie RCP  Outcome: Progressing     Problem: Pain  Goal: Verbalizes/displays adequate comfort level or baseline comfort level  6/26/2024 0926 by Karmen Cohen RN  Outcome: Progressing  6/25/2024 2322 by Sana Avila RN  Outcome: Progressing     Problem: Chronic Conditions and Co-morbidities  Goal: Patient's chronic conditions and co-morbidity symptoms are monitored and maintained or improved  6/26/2024 0926 by Karmen Cohen RN  Outcome: Progressing  6/25/2024 2322 by Sana Avila RN  Outcome: Progressing  Flowsheets (Taken 6/25/2024 1906 by

## 2024-06-26 NOTE — CARE COORDINATION
5802 to 7705 -     IFEANYI contacted patient's cousin, Amaya, who is aware that no [facility] have agreed to accepting patient yet.  She is agreeable to CM looking into SNF options but is aware that patient may not have coverage for short term rehab.  She is aware that LTC or a 30 day stay may be an option but patient will likely not get the same level of rehab.  Amaya agreeable to CM looking into any rehab options, no preference in location, but Amaya may consider bringing the patient rather that going into LTC.    IFEANYI contacted Edgardo (A liaison) who recommended sending referral to [facility] and [facility]; referrals sent via Epic.  She is aware of the patient's situation, will review chart and will contact patient's cousin, Amaya, to talk over options.    3055 - Edgardo confirmed that Amaya prefer [facility].  Edgardo to visit patient at bedside and will ask their insurance person if authorization will be needed.  RN confirmed that telesitter/sitter have been removed from room.    9468 - CCL notified of anticipated TOM tomorrow and confirmed sitter was removed from bedside earlier today.    1613 - IFEANYI emailed [facility] for [facility] completed earlier this year.        Phoebe Christianson, BISIN, RN    Care Management  [phone]

## 2024-06-26 NOTE — PROGRESS NOTES
Spiritual Care Assessment/Progress Note  [facility]    Name: Li Casanova MRN: [id number]    Age: 63 y.o.     Sex: female   Language: English     Date: 6/26/2024            Total Time Calculated: 14 min              Spiritual Assessment begun in [facility] 2 CARDIAC MEDICAL STEP DOWN  Service Provided For: Patient not available  Referral/Consult From: Rounding  Encounter Overview/Reason: Attempted Encounter    Spiritual beliefs:      [] Involved in a shanda tradition/spiritual practice:      [] Supported by a shanda community:      [] Claims no spiritual orientation:      [] Seeking spiritual identity:           [] Adheres to an individual form of spirituality:      [x] Not able to assess:                Identified resources for coping and support system:   Support System: Unknown       [] Prayer                  [] Devotional reading               [] Music                  [] Guided Imagery     [] Pet visits                                        [] Other: (COMMENT)     Specific area/focus of visit   Encounter:    Crisis:    Spiritual/Emotional needs: Type: Spiritual Support  Ritual, Rites and Sacraments:    Grief, Loss, and Adjustments:    Ethics/Mediation:    Behavioral Health:    Palliative Care:    Advance Care Planning:      Plan/Referrals: Continue to visit, (comment)    Narrative:    Reviewed chart prior to visit on [facility] unit for spiritual assessment and support.  No family/friends present. Ms Casanova appeared to be resting comfortably.  Unable to assess for spiritual needs or concerns at this time.   available upon referral by staff or by patient/family request.    AYALA Soto, BCC, Staff   [facility]     Paging Service [phone] ([phone])

## 2024-06-26 NOTE — PROGRESS NOTES
Bedside shift change report given to CHANDANA Shen/ CHANDANA Hood (oncoming nurse) by CHANDANA Peraza (offgoing nurse). Report included the following information Nurse Handoff Report, Index, ED Encounter Summary, ED SBAR, Adult Overview, Surgery Report, Intake/Output, MAR, Recent Results, Med Rec Status, Cardiac Rhythm NSR, and Alarm Parameters.

## 2024-06-26 NOTE — PLAN OF CARE
Problem: Discharge Planning  Goal: Discharge to home or other facility with appropriate resources  6/25/2024 2322 by Sana Avila RN  Outcome: Progressing  6/25/2024 1015 by Karmen Cohen RN  Outcome: Progressing     Problem: Skin/Tissue Integrity  Goal: Absence of new skin breakdown  Description: 1.  Monitor for areas of redness and/or skin breakdown  2.  Assess vascular access sites hourly  3.  Every 4-6 hours minimum:  Change oxygen saturation probe site  4.  Every 4-6 hours:  If on nasal continuous positive airway pressure, respiratory therapy assess nares and determine need for appliance change or resting period.  6/25/2024 2322 by Sana Avila RN  Outcome: Progressing  6/25/2024 1015 by Karmen Cohen RN  Outcome: Progressing     Problem: Safety - Adult  Goal: Free from fall injury  6/25/2024 2322 by Sana Avila RN  Outcome: Progressing  6/25/2024 1015 by Karmen Cohen RN  Outcome: Progressing     Problem: Respiratory - Adult  Goal: Achieves optimal ventilation and oxygenation  6/25/2024 2322 by Sana Avila RN  Outcome: Progressing  6/25/2024 2137 by Dian Marie RCP  Outcome: Progressing  6/25/2024 1015 by Karmen Cohen RN  Outcome: Progressing     Problem: Hematologic - Adult  Goal: Maintains hematologic stability  6/25/2024 2322 by Sana Avila RN  Outcome: Progressing  6/25/2024 1058 by Lilly Thurman RN  Outcome: Not Progressing  6/25/2024 1015 by Karmen Cohen RN  Outcome: Progressing

## 2024-06-27 LAB
ANION GAP SERPL CALC-SCNC: 5 MMOL/L (ref 5–15)
BUN SERPL-MCNC: 7 MG/DL (ref 6–20)
BUN/CREAT SERPL: 10 (ref 12–20)
CALCIUM SERPL-MCNC: 9.6 MG/DL (ref 8.5–10.1)
CHLORIDE SERPL-SCNC: 112 MMOL/L (ref 97–108)
CO2 SERPL-SCNC: 26 MMOL/L (ref 21–32)
CREAT SERPL-MCNC: 0.67 MG/DL (ref 0.55–1.02)
ERYTHROCYTE [DISTWIDTH] IN BLOOD BY AUTOMATED COUNT: 22.4 % (ref 11.5–14.5)
GLUCOSE SERPL-MCNC: 131 MG/DL (ref 65–100)
HCT VFR BLD AUTO: 25.7 % (ref 35–47)
HEMOCCULT STL QL: POSITIVE
HGB BLD-MCNC: 7.8 G/DL (ref 11.5–16)
MCH RBC QN AUTO: 27.8 PG (ref 26–34)
MCHC RBC AUTO-ENTMCNC: 30.4 G/DL (ref 30–36.5)
MCV RBC AUTO: 91.5 FL (ref 80–99)
NRBC # BLD: 0.1 K/UL (ref 0–0.01)
NRBC BLD-RTO: 0.8 PER 100 WBC
PLATELET # BLD AUTO: 317 K/UL (ref 150–400)
PMV BLD AUTO: 10.6 FL (ref 8.9–12.9)
POTASSIUM SERPL-SCNC: 3.1 MMOL/L (ref 3.5–5.1)
RBC # BLD AUTO: 2.81 M/UL (ref 3.8–5.2)
SODIUM SERPL-SCNC: 143 MMOL/L (ref 136–145)
WBC # BLD AUTO: 12.8 K/UL (ref 3.6–11)

## 2024-06-27 PROCEDURE — 85027 COMPLETE CBC AUTOMATED: CPT

## 2024-06-27 PROCEDURE — 6370000000 HC RX 637 (ALT 250 FOR IP): Performed by: GENERAL ACUTE CARE HOSPITAL

## 2024-06-27 PROCEDURE — 6370000000 HC RX 637 (ALT 250 FOR IP): Performed by: INTERNAL MEDICINE

## 2024-06-27 PROCEDURE — 2580000003 HC RX 258: Performed by: HOSPITALIST

## 2024-06-27 PROCEDURE — 94640 AIRWAY INHALATION TREATMENT: CPT

## 2024-06-27 PROCEDURE — 1100000003 HC PRIVATE W/ TELEMETRY

## 2024-06-27 PROCEDURE — 6370000000 HC RX 637 (ALT 250 FOR IP): Performed by: HOSPITALIST

## 2024-06-27 PROCEDURE — 94010 BREATHING CAPACITY TEST: CPT

## 2024-06-27 PROCEDURE — 82272 OCCULT BLD FECES 1-3 TESTS: CPT

## 2024-06-27 PROCEDURE — 36415 COLL VENOUS BLD VENIPUNCTURE: CPT

## 2024-06-27 PROCEDURE — G0238 OTH RESP PROC, INDIV: HCPCS

## 2024-06-27 PROCEDURE — 97535 SELF CARE MNGMENT TRAINING: CPT

## 2024-06-27 PROCEDURE — 6360000002 HC RX W HCPCS: Performed by: HOSPITALIST

## 2024-06-27 PROCEDURE — 97530 THERAPEUTIC ACTIVITIES: CPT

## 2024-06-27 PROCEDURE — 6370000000 HC RX 637 (ALT 250 FOR IP): Performed by: STUDENT IN AN ORGANIZED HEALTH CARE EDUCATION/TRAINING PROGRAM

## 2024-06-27 PROCEDURE — 6360000002 HC RX W HCPCS: Performed by: STUDENT IN AN ORGANIZED HEALTH CARE EDUCATION/TRAINING PROGRAM

## 2024-06-27 PROCEDURE — 6370000000 HC RX 637 (ALT 250 FOR IP): Performed by: NURSE PRACTITIONER

## 2024-06-27 PROCEDURE — 2580000003 HC RX 258: Performed by: GENERAL ACUTE CARE HOSPITAL

## 2024-06-27 PROCEDURE — 80048 BASIC METABOLIC PNL TOTAL CA: CPT

## 2024-06-27 PROCEDURE — 97116 GAIT TRAINING THERAPY: CPT

## 2024-06-27 RX ORDER — POTASSIUM CHLORIDE 20 MEQ/1
40 TABLET, EXTENDED RELEASE ORAL ONCE
Status: COMPLETED | OUTPATIENT
Start: 2024-06-27 | End: 2024-06-27

## 2024-06-27 RX ADMIN — BUDESONIDE 250 MCG: 0.25 INHALANT RESPIRATORY (INHALATION) at 21:43

## 2024-06-27 RX ADMIN — ARFORMOTEROL TARTRATE 15 MCG: 15 SOLUTION RESPIRATORY (INHALATION) at 07:07

## 2024-06-27 RX ADMIN — ARFORMOTEROL TARTRATE 15 MCG: 15 SOLUTION RESPIRATORY (INHALATION) at 21:42

## 2024-06-27 RX ADMIN — 0.12% CHLORHEXIDINE GLUCONATE 15 ML: 1.2 RINSE ORAL at 20:56

## 2024-06-27 RX ADMIN — Medication 100 MG: at 08:52

## 2024-06-27 RX ADMIN — QUETIAPINE FUMARATE 50 MG: 25 TABLET ORAL at 20:57

## 2024-06-27 RX ADMIN — 0.12% CHLORHEXIDINE GLUCONATE 15 ML: 1.2 RINSE ORAL at 08:52

## 2024-06-27 RX ADMIN — IPRATROPIUM BROMIDE AND ALBUTEROL SULFATE 1 DOSE: .5; 3 SOLUTION RESPIRATORY (INHALATION) at 21:44

## 2024-06-27 RX ADMIN — SODIUM CHLORIDE, PRESERVATIVE FREE 10 ML: 5 INJECTION INTRAVENOUS at 08:54

## 2024-06-27 RX ADMIN — GUAIFENESIN 600 MG: 600 TABLET, EXTENDED RELEASE ORAL at 20:57

## 2024-06-27 RX ADMIN — Medication 1 AMPULE: at 08:52

## 2024-06-27 RX ADMIN — GUAIFENESIN 600 MG: 600 TABLET, EXTENDED RELEASE ORAL at 08:52

## 2024-06-27 RX ADMIN — ATORVASTATIN CALCIUM 20 MG: 20 TABLET, FILM COATED ORAL at 08:52

## 2024-06-27 RX ADMIN — LANSOPRAZOLE 30 MG: 30 TABLET, ORALLY DISINTEGRATING, DELAYED RELEASE ORAL at 05:26

## 2024-06-27 RX ADMIN — IPRATROPIUM BROMIDE AND ALBUTEROL SULFATE 1 DOSE: .5; 3 SOLUTION RESPIRATORY (INHALATION) at 11:19

## 2024-06-27 RX ADMIN — WATER 40 MG: 1 INJECTION INTRAMUSCULAR; INTRAVENOUS; SUBCUTANEOUS at 08:53

## 2024-06-27 RX ADMIN — POTASSIUM CHLORIDE 40 MEQ: 1500 TABLET, EXTENDED RELEASE ORAL at 09:43

## 2024-06-27 RX ADMIN — SODIUM CHLORIDE, PRESERVATIVE FREE 10 ML: 5 INJECTION INTRAVENOUS at 20:58

## 2024-06-27 RX ADMIN — ACETAMINOPHEN 650 MG: 325 TABLET ORAL at 20:56

## 2024-06-27 RX ADMIN — QUETIAPINE FUMARATE 50 MG: 25 TABLET ORAL at 08:52

## 2024-06-27 RX ADMIN — IPRATROPIUM BROMIDE AND ALBUTEROL SULFATE 1 DOSE: .5; 3 SOLUTION RESPIRATORY (INHALATION) at 07:02

## 2024-06-27 RX ADMIN — BUDESONIDE 250 MCG: 0.25 INHALANT RESPIRATORY (INHALATION) at 07:07

## 2024-06-27 RX ADMIN — SERTRALINE 50 MG: 50 TABLET, FILM COATED ORAL at 08:52

## 2024-06-27 RX ADMIN — AMLODIPINE BESYLATE 10 MG: 5 TABLET ORAL at 08:52

## 2024-06-27 RX ADMIN — Medication 1 AMPULE: at 20:56

## 2024-06-27 RX ADMIN — IPRATROPIUM BROMIDE AND ALBUTEROL SULFATE 1 DOSE: .5; 3 SOLUTION RESPIRATORY (INHALATION) at 15:11

## 2024-06-27 RX ADMIN — FOLIC ACID 1 MG: 1 TABLET ORAL at 08:52

## 2024-06-27 RX ADMIN — HYDROXYZINE HYDROCHLORIDE 10 MG: 10 TABLET ORAL at 20:57

## 2024-06-27 ASSESSMENT — PAIN SCALES - GENERAL
PAINLEVEL_OUTOF10: 0

## 2024-06-27 ASSESSMENT — COPD QUESTIONNAIRES
QUESTION6_LEAVINGHOUSE: 5
QUESTION5_HOMEACTIVITIES: 5
QUESTION3_CHESTTIGHTNESS: 0
QUESTION7_SLEEPQUALITY: 0
CAT_TOTALSCORE: 18
QUESTION4_WALKINCLINE: 3
QUESTION1_COUGHFREQUENCY: 0
QUESTION8_ENERGYLEVEL: 5
QUESTION2_CHESTPHLEGM: 0

## 2024-06-27 NOTE — PLAN OF CARE
Problem: Physical Therapy - Adult  Goal: By Discharge: Performs mobility at highest level of function for planned discharge setting.  See evaluation for individualized goals.  Description: FUNCTIONAL STATUS PRIOR TO ADMISSION: Patient was modified independent without AD for functional mobility.  Patient is not a reliable historian, but reports she ambulated household distances without assistance and denies falls.  Has history of daily drinking and positive for cocaine on admission.      HOME SUPPORT PRIOR TO ADMISSION: The patient lived with family who can assist as needed.    Physical Therapy Goals  Initiated 6/24/2024  1.  Patient will move from supine to sit and sit to supine in bed with independence within 7 day(s).    2.  Patient will perform sit to stand with supervision/set-up within 7 day(s).  3.  Patient will transfer from bed to chair and chair to bed with supervision/set-up using the least restrictive device within 7 day(s).  4.  Patient will ambulate with supervision/set-up for 150 feet with the least restrictive device within 7 day(s).   5.  Patient will ascend/descend 3 stairs with  handrail(s) with contact guard assist within 7 day(s).   Outcome: Progressing   PHYSICAL THERAPY TREATMENT    Patient: Li Casanova (63 y.o. female)  Date: 6/27/2024  Diagnosis: Shortness of breath [R06.02]  Acute exacerbation of chronic obstructive pulmonary disease (COPD) ([facility]) [J44.1]  COPD exacerbation (HCC) [J44.1] Acute exacerbation of chronic obstructive pulmonary disease (COPD) (HCC)      Precautions: Fall Risk, Seizure                      ASSESSMENT:  Patient continues to benefit from skilled PT services and is progressing towards goals. Patient received up in chair and agreeable to participate, was able to progress ambulation into the hallway x approx 60 feet with RW and min assist.  Gait iris is slow, no ataxia noted,  does have increased trunk sway but no overt LOB.  Sats 95% on RA post ambulation, note

## 2024-06-27 NOTE — PLAN OF CARE
Problem: Occupational Therapy - Adult  Goal: By Discharge: Performs self-care activities at highest level of function for planned discharge setting.  See evaluation for individualized goals.  Description: FUNCTIONAL STATUS PRIOR TO ADMISSION:  Patient requires assistance for ADLs, ambulates without AD. History of CVA in 2023.    Receives Help From: Family, Personal care attendant (per chart review, recently approved for caregivers for 4 hours/day x 7 days/week), ADL Assistance: Needs assistance (per chart review, cousin assists with bathing and dressing), Ambulation Assistance: Independent, Transfer Assistance: Independent, Active : No     HOME SUPPORT: Patient lived with cousin to provide assistance for ADLs.    Occupational Therapy Goals:  Initiated 6/24/2024  1.  Patient will perform grooming with Contact Guard Assist within 7 day(s).  2.  Patient will perform upper body dressing with Minimal Assist within 7 day(s).  3.  Patient will perform lower body dressing with Moderate Assist within 7 day(s).  4.  Patient will perform toilet transfers with Contact Guard Assist  within 7 day(s).  5.  Patient will perform all aspects of toileting with Moderate Assist within 7 day(s).  6.  Patient will participate in upper extremity therapeutic exercise/activities with Contact Guard Assist for 5 minutes within 7 day(s).    7.  Patient will utilize energy conservation techniques during functional activities with verbal, visual, and tactile cues within 7 day(s).    Outcome: Progressing    OCCUPATIONAL THERAPY TREATMENT  Patient: Li Casanova (63 y.o. female)  Date: 6/27/2024  Primary Diagnosis: Shortness of breath [R06.02]  Acute exacerbation of chronic obstructive pulmonary disease (COPD) (HCC) [J44.1]  COPD exacerbation (HCC) [J44.1]       Precautions: Fall Risk, Seizure                Chart, occupational therapy assessment, plan of care, and goals were reviewed.    ASSESSMENT  Patient continues to benefit from

## 2024-06-27 NOTE — PROGRESS NOTES
Hospitalist Progress Note    NAME:   Li Casanova   : 1960   MRN: [id number]     Date/Time: 2024    Patient PCP: Star Napier MD    Icu course :  64 y/o female PMHx of CAD, HTN, CVA  COPD/asthma (on chronic home 2L) chronic tobacco use disorder and polysubstance abuse use admitted () by hospital medicine for COPD exacerbation after presenting with acute onset of shortness of breath.  CTA chest-negative for PE.  She was started on systemic steroids and bronchodilators.  Per reports she has a history of daily alcohol use as well as history of polysubstance abuse.  Her UDS was positive for cocaine.  Rapid response was called due to tachycardia (140s), worsening tachypnea with RR in the 40s with diffuse wheezing throughout as well as increased agitation. Patient was transferred to the ICU, started on Precedex infusion, phenobarbital taper and diltiazem infusion.      : resting comfortably on the ventilator  : deeply sedated  : Failed SAT/SBT this morning, currently on Precedex 1.2, propofol 40, on phenobarbital taper and started on scheduled Seroquel . Triglycerides 462, re-check in AM, may need to stop propofol Continue to assess mentation for appropriateness for SAT/SBT, if fails cuff leak, will need to consider early trach.  : Patient remains intubated and sedated. Intermittently very agitated when sedation weaning is attempted, has copious oral secretion   : patient was taken off ventilator and et tube removed and transferred to floors .     Assessment / Plan:      Acute hypoxic/hypercapnic respiratory failure requiring emergent intubation - in the setting of COPD/asthma exacerbation, with inability to protect airways in the setting tox metabolic encephalopathy due to illicit drug ingestion/withdrawal. (Of note small glottic opening, INTUBATED WITH 7.0 ETT )  CTA chest-leg PE, emphysema with extensive peripheral scarring and subsegmental atelectasis, pulmonary  Culture, Respiratory [[id number]] Collected: 06/22/24 1715    Order Status: Canceled Specimen: Sputum Expectorated     Culture, Blood 1 [[id number]] Collected: 06/22/24 1522    Order Status: Completed Specimen: Blood Updated: 06/27/24 0536     Special Requests LEFT        Culture NO GROWTH 5 DAYS       Culture, Blood 2 [[id number]] Collected: 06/22/24 1522    Order Status: Completed Specimen: Blood Updated: 06/27/24 0536     Special Requests RIGHT        Culture NO GROWTH 5 DAYS       Culture, Respiratory [[id number]]  (Abnormal)  (Susceptibility) Collected: 06/17/24 0537    Order Status: Completed Specimen: Endotracheal Updated: 06/20/24 0851     Special Requests NO SPECIAL REQUESTS        Gram Stain 1+ Gram positive cocci               RARE EPITHELIAL CELLS SEEN            OCCASIONAL WBCS SEEN        Culture       MODERATE Staphylococcus aureus                  SCANT STREPTOCOCCUS PNEUMONIAE          Susceptibility        Staphylococcus aureus      BACTERIAL SUSCEPTIBILITY PANEL RODOLFO      ciprofloxacin <=0.5 ug/mL Sensitive      clindamycin 0.25 ug/mL Sensitive      doxycycline <=0.5 ug/mL Sensitive      erythromycin >=8 ug/mL Resistant      gentamicin <=0.5 ug/mL Sensitive      levofloxacin 0.25 ug/mL Sensitive      linezolid 2 ug/mL Sensitive      moxifloxacin <=0.25 ug/mL Sensitive      oxacillin <=0.25 ug/mL Sensitive      rifampin <=0.5 ug/mL Sensitive  [1]       tetracycline <=1 ug/mL Sensitive      trimethoprim-sulfamethoxazole <=10 ug/mL Sensitive      vancomycin 1 ug/mL Sensitive                   [1]  Rifampin is not to be used for mono-therapy.               Susceptibility        Streptococcus pneumoniae      BACTERIAL SUSCEPTIBILITY PANEL RODOLFO      Cefotaxime - meningitis <=0.12 ug/mL Sensitive      Cefotaxime - non-meningitis  <=0.12 ug/mL Sensitive      cefTRIAXone (meningitis) <=0.12 ug/mL Sensitive      Ceftriaxone (non-meningitis) <=0.12 ug/mL Sensitive      erythromycin >=8 ug/mL Resistant

## 2024-06-27 NOTE — PROGRESS NOTES
0745: Bedside and Verbal shift change report given to Jasmyne Mora RN  (oncoming nurse) by CHANDANA Shen (offgoing nurse). Report included the following information Nurse Handoff Report, Index, ED Encounter Summary, Intake/Output, MAR, Recent Results, and Cardiac Rhythm NSR    0950: CBC drawn & sent.      1045: Assisted pt x1 to bathroom for BM, pt ambulated on room air. Tolerated well.     1130: Occult stool collected & sent.    1930: End of Shift Note    Bedside shift change report given to RN (oncoming nurse) by Jasmyne Mora RN (offgoing nurse).  Report included the following information SBAR, Kardex, ED Summary, Intake/Output, MAR, Recent Results, and Cardiac Rhythm NSR/sinus tach    Shift worked:  0718-5217     Shift summary and any significant changes:     - Occult stool positive, GI consulted    - Pt on room air, O2 sats 92-94%    - Pt had 2 dark loose Bms this shift     Concerns for physician to address:       Zone phone for oncoming shift:          Activity:     Number times ambulated in hallways past shift: 0  Number of times OOB to chair past shift: 2    Cardiac:   Cardiac Monitoring: Yes           Access:  Current line(s): PIV     Genitourinary:   Urinary status: voiding    Respiratory:      Chronic home O2 use?: YES  Incentive spirometer at bedside: NO       GI:     Current diet:  ADULT DIET; Regular  Passing flatus: YES  Tolerating current diet: YES       Pain Management:   Patient states pain is manageable on current regimen: YES    Skin:     Interventions: float heels, increase time out of bed, PT/OT consult, and internal/external urinary devices    Patient Safety:  Fall Score:    Interventions: bed/chair alarm, assistive device (walker, cane. etc), gripper socks, pt to call before getting OOB, and stay with me (per policy)       Length of Stay:  Expected LOS: 12  Actual LOS: 11      Jasmyne Mora RN

## 2024-06-27 NOTE — CONSULTS
Jackie Alex, NP-C                       [phone] cell                  Monday-Thursday 7:30-5:00                           Gastroenterology Consultation Note      Admit Date: 6/16/2024  Consult Date: 6/27/2024   I greatly appreciate your asking me to see Li Casanova, thank you very much for the opportunity to participate in her care.    Narrative Assessment and Plan   GI consultation for iron deficiency anemia and heme positive stool.  63-year-old female with PMH of CAD, HTN, CVA on Plavix, COPD/asthma on home O2, tobacco use disorder, polysubstance abuse, alcohol abuse.  Presented to the hospital with sudden onset of shortness of breath and admitted for acute COPD exacerbation.  Had reported history of daily alcohol use and UDS was positive for cocaine.  She went into withdrawal and was transferred to ICU for Precedex infusion, phenobarbital taper, and Cardizem infusion.  Last dose of Plavix was 6/23/2024.  She is chronically anemic with hemoglobin noted to be between 9 and 11 on chart review.  She dropped as low 6.4 during her hospitalization and today is 7.8.  She denies any overt GI bleeding.  No abdominal pain or nausea or vomiting.  She stated from the beginning of our visit that she does not want an EGD or colonoscopy and she has never had an EGD or colonoscopy.  There is no family history of GI disease.  She denies any NSAID use.    Impression:  Iron deficiency anemia  Heme positive stool  CAD  HTN  History of CVA on antithrombotic medication  COPD/asthma on home O2  Tobacco use disorder  Polysubstance abuse  Alcohol abuse    She ultimately would not agree to an EGD or colonoscopy after I described the procedures to her and discussed the recommended plan in order for us to clear her to have her blood thinners resumed.  I asked her which she think about it and she said she would so we will check back  disintegrating tablet 30 mg  30 mg Oral QAM AC    ipratropium 0.5 mg-albuterol 2.5 mg (DUONEB) nebulizer solution 1 Dose  1 Dose Inhalation Q4H WA RT    methylPREDNISolone sodium succ (SOLU-MEDROL) 40 mg in sterile water 1 mL injection  40 mg IntraVENous Daily    labetalol (NORMODYNE;TRANDATE) injection syringe 10 mg  10 mg IntraVENous Q4H PRN    QUEtiapine (SEROQUEL) tablet 50 mg  50 mg Oral BID    amLODIPine (NORVASC) tablet 10 mg  10 mg Oral Daily    alcohol 62% (NOZIN) nasal  1 ampule  1 ampule Nasal Q12H    chlorhexidine (PERIDEX) 0.12 % solution 15 mL  15 mL Mouth/Throat BID    sertraline (ZOLOFT) tablet 50 mg  50 mg Orogastric Daily    [Held by provider] hydroCHLOROthiazide (HYDRODIURIL) tablet 25 mg  25 mg Oral Daily    sodium chloride flush 0.9 % injection 5-40 mL  5-40 mL IntraVENous 2 times per day    sodium chloride flush 0.9 % injection 5-40 mL  5-40 mL IntraVENous PRN    ondansetron (ZOFRAN-ODT) disintegrating tablet 4 mg  4 mg Oral Q8H PRN    Or    ondansetron (ZOFRAN) injection 4 mg  4 mg IntraVENous Q6H PRN    polyethylene glycol (GLYCOLAX) packet 17 g  17 g Oral Daily PRN    acetaminophen (TYLENOL) tablet 650 mg  650 mg Oral Q6H PRN    Or    acetaminophen (TYLENOL) suppository 650 mg  650 mg Rectal Q6H PRN    budesonide (PULMICORT) nebulizer suspension 250 mcg  0.25 mg Nebulization BID RT    And    arformoterol tartrate (BROVANA) nebulizer solution 15 mcg  15 mcg Nebulization BID RT    levalbuterol (XOPENEX) nebulizer solution 1.25 mg  1.25 mg Nebulization Q4H PRN    fentaNYL (SUBLIMAZE) injection 25 mcg  25 mcg IntraVENous Q6H PRN    glucose chewable tablet 16 g  4 tablet Oral PRN    dextrose bolus 10% 125 mL  125 mL IntraVENous PRN    Or    dextrose bolus 10% 250 mL  250 mL IntraVENous PRN    glucagon injection 1 mg  1 mg SubCUTAneous PRN    dextrose 10 % infusion   IntraVENous Continuous PRN    [Held by provider] clopidogrel (PLAVIX) tablet 75 mg  75 mg Orogastric Daily    [Held by
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
## 2024-06-27 NOTE — PROGRESS NOTES
ADULT PROTOCOL: JET AEROSOL ASSESSMENT    Patient  Li Casanova     63 y.o.   female     6/27/2024  9:27 AM    Breath Sounds Pre Procedure: Breath Sounds Pre-Tx VANESSA: Scattered wheezes                                  Breath Sounds Pre-Tx LLL: Scattered wheezes        Breath Sounds Pre-Tx RUL: Scattered wheezes        Breath Sounds Pre-Tx RML: Scattered wheezes        Breath Sounds Pre-Tx RLL: Diminished  Breath Sounds Post Procedure: Breath Sounds Post-Tx VANESSA: Scattered wheezes          Breath Sounds Post-Tx LLL: Scattered wheezes          Breath Sounds Post-Tx RUL: Scattered wheezes          Breath Sounds Post-Tx RML: Scattered wheezes          Breath Sounds Post-Tx RLL: Diminished                                     Heart Rate: Pre procedure Pre-Tx Pulse: 89           Post procedure Post-Tx Pulse: 89    Resp Rate: Pre procedure Pre-Tx Resps: 16           Post procedure Post-Tx Resps: 16    Peak Flow: Pre bronchodilator  Peak Flow: 95          Oxygen: O2 Therapy: Oxygen   nasal cannula     Changed: No    SpO2:  SpO2: 97 %   with Oxygen                Nebulizer Therapy: Current medications Medications: Arformoterol, Budesonide      Changed: No    Smoking History:   Tobacco Use    Smoking status: Every Day       Current packs/day: 1.00       Types: Cigarettes    Smokeless tobacco: Never       Problem List:   Patient Active Problem List   Diagnosis    NSTEMI (non-ST elevated myocardial infarction) ([facility])    COPD (chronic obstructive pulmonary disease) ([facility])    Acute exacerbation of chronic obstructive pulmonary disease (COPD) ([facility])       Respiratory Therapist: Eduardo Dial, RT

## 2024-06-27 NOTE — CARE COORDINATION
Transition of Care Plan:     RUR: 28% (high RUR)  Prior Level of Functioning: Assistance with ADLs  Disposition: [facility] under Medicaid, then return home with family (cousin is looking into CGs); substance resources listed on AVS  If SNF or IPR: Date FOC offered: 6/24, 6/26  Date FOC received: 6/24 - IPR ([facility], [facility]); 6/26 - SNF - any accepting facility  Accepting facility: [facility], [facility]  Date authorization started with reference number: N/A  Date authorization received and expires: N/A  Follow up appointments: defer to rehab.  DME needed: defer to rehab.  Patient has 2L O2 NC and cane at home.  Transportation at discharge: Stretcher anticipated to be needed.  IM/IMM Medicare/ letter given: N/A Medicaid  Is patient a  and connected with VA? No              If yes, was  transfer form completed and VA notified? N/A  Caregiver Contact: Amaya Freire - teresasin - [phone]  Discharge Caregiver contacted prior to discharge? Caregiver to be contacted prior to discharge.   Care Conference needed? No  Barriers to discharge: CIWA, Hgb/HR stability    0836 to 0915 - CM received message from [facility] regarding patient not needing authorization to go to their facility.  Information regarding patient's case provided to liaison yesterday.  CM contacted Amaya who is agreeable to patient going to [facility] today, for at least thirty days, if patient is ready.  Amaya agreeable to CM completing a UAI for potential needs in the future.  CM completed UAI and is pending signature at this time.     1036 - UAI received from [facility] and sent to [facility] liaison.    1250 - CM asked attending if patient will discharge today or tomorrow.    1330 - Attending notified CM that TOM will likely be tomorrow or Saturday pending GI consult.  Edgardo ([facility] liaison) notified of this.      Phoebe Christianson, BISIN, RN    Care Management  [phone]

## 2024-06-27 NOTE — PROGRESS NOTES
Bedside shift change report given to CHANDANA Medley  (oncoming nurse) by CHANDANA Shen/ CHANDANA Hood  (offgoing nurse). Report included the following information Nurse Handoff Report, Index, ED Encounter Summary, ED SBAR, Adult Overview, Surgery Report, Intake/Output, MAR, Recent Results, Med Rec Status, Cardiac Rhythm NSR, and Alarm Parameters.

## 2024-06-27 NOTE — PROGRESS NOTES
Bedside shift change report given to CHANDANA Shen/ CHANDANA Hood (oncoming nurse) by Karmen Cohen RN  (offgoing nurse). Report included the following information Nurse Handoff Report, Index, ED Encounter Summary, ED SBAR, Adult Overview, Surgery Report, Intake/Output, MAR, Recent Results, Med Rec Status, Cardiac Rhythm NSR, and Alarm Parameters.

## 2024-06-27 NOTE — PLAN OF CARE
Problem: Discharge Planning  Goal: Discharge to home or other facility with appropriate resources  Outcome: Progressing  Flowsheets (Taken 6/26/2024 1924)  Discharge to home or other facility with appropriate resources: Identify barriers to discharge with patient and caregiver     Problem: Skin/Tissue Integrity  Goal: Absence of new skin breakdown  Description: 1.  Monitor for areas of redness and/or skin breakdown  2.  Assess vascular access sites hourly  3.  Every 4-6 hours minimum:  Change oxygen saturation probe site  4.  Every 4-6 hours:  If on nasal continuous positive airway pressure, respiratory therapy assess nares and determine need for appliance change or resting period.  Outcome: Progressing     Problem: Safety - Adult  Goal: Free from fall injury  Outcome: Progressing  Flowsheets (Taken 6/26/2024 1924)  Free From Fall Injury:   Instruct family/caregiver on patient safety   Based on caregiver fall risk screen, instruct family/caregiver to ask for assistance with transferring infant if caregiver noted to have fall risk factors     Problem: Respiratory - Adult  Goal: Achieves optimal ventilation and oxygenation  6/27/2024 0204 by Hermelinda Horvath, RN  Outcome: Progressing  6/26/2024 2149 by Socorro Gonzalez, RT  Outcome: Progressing  Flowsheets (Taken 6/26/2024 1924 by Hermelinda Horvath, RN)  Achieves optimal ventilation and oxygenation: Assess and instruct to report shortness of breath or any respiratory difficulty  6/26/2024 1751 by Dayana Briceno, RT  Outcome: Progressing     Problem: Pain  Goal: Verbalizes/displays adequate comfort level or baseline comfort level  Outcome: Progressing     Problem: Chronic Conditions and Co-morbidities  Goal: Patient's chronic conditions and co-morbidity symptoms are monitored and maintained or improved  Outcome: Progressing  Flowsheets (Taken 6/26/2024 1924)  Care Plan - Patient's Chronic Conditions and Co-Morbidity Symptoms are Monitored and Maintained or

## 2024-06-27 NOTE — PLAN OF CARE
Problem: Discharge Planning  Goal: Discharge to home or other facility with appropriate resources  6/27/2024 0818 by Jasmyne Mora RN  Outcome: Progressing  6/27/2024 0204 by Hermelinda Horvath RN  Outcome: Progressing  Flowsheets (Taken 6/26/2024 1924)  Discharge to home or other facility with appropriate resources: Identify barriers to discharge with patient and caregiver     Problem: Safety - Adult  Goal: Free from fall injury  6/27/2024 0818 by Jasmyne Mora RN  Outcome: Progressing  6/27/2024 0204 by Hermelinda Horvath RN  Outcome: Progressing  Flowsheets (Taken 6/26/2024 1924)  Free From Fall Injury:   Instruct family/caregiver on patient safety   Based on caregiver fall risk screen, instruct family/caregiver to ask for assistance with transferring infant if caregiver noted to have fall risk factors     Problem: Pain  Goal: Verbalizes/displays adequate comfort level or baseline comfort level  6/27/2024 0818 by Jasmyne Mora RN  Outcome: Progressing  Flowsheets (Taken 6/27/2024 0745)  Verbalizes/displays adequate comfort level or baseline comfort level: Encourage patient to monitor pain and request assistance  6/27/2024 0204 by Hermelinda Horvath RN  Outcome: Progressing

## 2024-06-28 ENCOUNTER — APPOINTMENT (OUTPATIENT)
Facility: HOSPITAL | Age: 64
End: 2024-06-28
Payer: MEDICAID

## 2024-06-28 LAB
BACTERIA SPEC CULT: NORMAL
BACTERIA SPEC CULT: NORMAL
ERYTHROCYTE [DISTWIDTH] IN BLOOD BY AUTOMATED COUNT: 23.8 % (ref 11.5–14.5)
HCT VFR BLD AUTO: 27.8 % (ref 35–47)
HGB BLD-MCNC: 8.4 G/DL (ref 11.5–16)
MCH RBC QN AUTO: 28.4 PG (ref 26–34)
MCHC RBC AUTO-ENTMCNC: 30.2 G/DL (ref 30–36.5)
MCV RBC AUTO: 93.9 FL (ref 80–99)
NRBC # BLD: 0.12 K/UL (ref 0–0.01)
NRBC BLD-RTO: 0.7 PER 100 WBC
PLATELET # BLD AUTO: 353 K/UL (ref 150–400)
PMV BLD AUTO: 10.4 FL (ref 8.9–12.9)
RBC # BLD AUTO: 2.96 M/UL (ref 3.8–5.2)
SERVICE CMNT-IMP: NORMAL
SERVICE CMNT-IMP: NORMAL
WBC # BLD AUTO: 16.2 K/UL (ref 3.6–11)

## 2024-06-28 PROCEDURE — 97530 THERAPEUTIC ACTIVITIES: CPT

## 2024-06-28 PROCEDURE — 6370000000 HC RX 637 (ALT 250 FOR IP): Performed by: GENERAL ACUTE CARE HOSPITAL

## 2024-06-28 PROCEDURE — 85027 COMPLETE CBC AUTOMATED: CPT

## 2024-06-28 PROCEDURE — 97116 GAIT TRAINING THERAPY: CPT

## 2024-06-28 PROCEDURE — 97535 SELF CARE MNGMENT TRAINING: CPT

## 2024-06-28 PROCEDURE — 97110 THERAPEUTIC EXERCISES: CPT

## 2024-06-28 PROCEDURE — 94640 AIRWAY INHALATION TREATMENT: CPT

## 2024-06-28 PROCEDURE — 6360000002 HC RX W HCPCS: Performed by: STUDENT IN AN ORGANIZED HEALTH CARE EDUCATION/TRAINING PROGRAM

## 2024-06-28 PROCEDURE — 6370000000 HC RX 637 (ALT 250 FOR IP): Performed by: STUDENT IN AN ORGANIZED HEALTH CARE EDUCATION/TRAINING PROGRAM

## 2024-06-28 PROCEDURE — 1100000003 HC PRIVATE W/ TELEMETRY

## 2024-06-28 PROCEDURE — 2580000003 HC RX 258: Performed by: GENERAL ACUTE CARE HOSPITAL

## 2024-06-28 PROCEDURE — 6370000000 HC RX 637 (ALT 250 FOR IP): Performed by: HOSPITALIST

## 2024-06-28 PROCEDURE — 76700 US EXAM ABDOM COMPLETE: CPT

## 2024-06-28 PROCEDURE — 6370000000 HC RX 637 (ALT 250 FOR IP): Performed by: INTERNAL MEDICINE

## 2024-06-28 PROCEDURE — 36415 COLL VENOUS BLD VENIPUNCTURE: CPT

## 2024-06-28 PROCEDURE — 2580000003 HC RX 258: Performed by: STUDENT IN AN ORGANIZED HEALTH CARE EDUCATION/TRAINING PROGRAM

## 2024-06-28 PROCEDURE — 2700000000 HC OXYGEN THERAPY PER DAY

## 2024-06-28 PROCEDURE — 6370000000 HC RX 637 (ALT 250 FOR IP): Performed by: NURSE PRACTITIONER

## 2024-06-28 RX ORDER — IPRATROPIUM BROMIDE AND ALBUTEROL SULFATE 2.5; .5 MG/3ML; MG/3ML
1 SOLUTION RESPIRATORY (INHALATION) EVERY 4 HOURS PRN
Status: DISCONTINUED | OUTPATIENT
Start: 2024-06-28 | End: 2024-06-29 | Stop reason: HOSPADM

## 2024-06-28 RX ADMIN — BUDESONIDE 250 MCG: 0.25 INHALANT RESPIRATORY (INHALATION) at 20:36

## 2024-06-28 RX ADMIN — QUETIAPINE FUMARATE 50 MG: 25 TABLET ORAL at 10:47

## 2024-06-28 RX ADMIN — SODIUM CHLORIDE, PRESERVATIVE FREE 10 ML: 5 INJECTION INTRAVENOUS at 10:51

## 2024-06-28 RX ADMIN — SERTRALINE 50 MG: 50 TABLET, FILM COATED ORAL at 10:47

## 2024-06-28 RX ADMIN — SODIUM CHLORIDE, PRESERVATIVE FREE 10 ML: 5 INJECTION INTRAVENOUS at 20:37

## 2024-06-28 RX ADMIN — GUAIFENESIN 600 MG: 600 TABLET, EXTENDED RELEASE ORAL at 10:47

## 2024-06-28 RX ADMIN — IPRATROPIUM BROMIDE AND ALBUTEROL SULFATE 1 DOSE: .5; 3 SOLUTION RESPIRATORY (INHALATION) at 09:21

## 2024-06-28 RX ADMIN — ATORVASTATIN CALCIUM 20 MG: 20 TABLET, FILM COATED ORAL at 10:47

## 2024-06-28 RX ADMIN — Medication 1 AMPULE: at 10:47

## 2024-06-28 RX ADMIN — Medication 1 AMPULE: at 20:37

## 2024-06-28 RX ADMIN — Medication 100 MG: at 10:47

## 2024-06-28 RX ADMIN — QUETIAPINE FUMARATE 50 MG: 25 TABLET ORAL at 20:34

## 2024-06-28 RX ADMIN — LANSOPRAZOLE 30 MG: 30 TABLET, ORALLY DISINTEGRATING, DELAYED RELEASE ORAL at 05:50

## 2024-06-28 RX ADMIN — HYDROXYZINE HYDROCHLORIDE 10 MG: 10 TABLET ORAL at 20:34

## 2024-06-28 RX ADMIN — SODIUM CHLORIDE, PRESERVATIVE FREE 10 ML: 5 INJECTION INTRAVENOUS at 20:36

## 2024-06-28 RX ADMIN — FOLIC ACID 1 MG: 1 TABLET ORAL at 10:47

## 2024-06-28 RX ADMIN — BUDESONIDE 250 MCG: 0.25 INHALANT RESPIRATORY (INHALATION) at 09:21

## 2024-06-28 RX ADMIN — 0.12% CHLORHEXIDINE GLUCONATE 15 ML: 1.2 RINSE ORAL at 10:47

## 2024-06-28 RX ADMIN — GUAIFENESIN 600 MG: 600 TABLET, EXTENDED RELEASE ORAL at 20:34

## 2024-06-28 RX ADMIN — ARFORMOTEROL TARTRATE 15 MCG: 15 SOLUTION RESPIRATORY (INHALATION) at 20:36

## 2024-06-28 RX ADMIN — ARFORMOTEROL TARTRATE 15 MCG: 15 SOLUTION RESPIRATORY (INHALATION) at 09:21

## 2024-06-28 ASSESSMENT — PAIN SCALES - GENERAL
PAINLEVEL_OUTOF10: 0

## 2024-06-28 NOTE — PROGRESS NOTES
Gastroenterology Daily Progress Note   (MAIRA Quintanilla for Dr. Mcdonald)   [facility]    Admit Date: 6/16/2024     Follow up of anemia    Subjective:       No c/o. No hematochezia or melena. Wants to do EGD/Colon as outpatient.  Thinks her plavix use is for hx of CVA. Admits to drinking 40oz beer daily for many years and using cocaine but states she will stop using cocaine.    Current Facility-Administered Medications   Medication Dose Route Frequency    0.9 % sodium chloride infusion   IntraVENous PRN    guaiFENesin (MUCINEX) extended release tablet 600 mg  600 mg Oral BID    hydrOXYzine HCl (ATARAX) tablet 10 mg  10 mg Oral TID PRN    sodium chloride flush 0.9 % injection 5-40 mL  5-40 mL IntraVENous 2 times per day    sodium chloride flush 0.9 % injection 5-40 mL  5-40 mL IntraVENous PRN    0.9 % sodium chloride infusion   IntraVENous PRN    thiamine mononitrate tablet 100 mg  100 mg Oral Daily    LORazepam (ATIVAN) tablet 1 mg  1 mg Oral Q1H PRN    Or    LORazepam (ATIVAN) injection 1 mg  1 mg IntraVENous Q1H PRN    Or    LORazepam (ATIVAN) tablet 2 mg  2 mg Oral Q1H PRN    Or    LORazepam (ATIVAN) injection 2 mg  2 mg IntraVENous Q1H PRN    Or    LORazepam (ATIVAN) tablet 3 mg  3 mg Oral Q1H PRN    Or    LORazepam (ATIVAN) injection 3 mg  3 mg IntraVENous Q1H PRN    Or    LORazepam (ATIVAN) tablet 4 mg  4 mg Oral Q1H PRN    Or    LORazepam (ATIVAN) injection 4 mg  4 mg IntraVENous Q1H PRN    folic acid (FOLVITE) tablet 1 mg  1 mg Oral Daily    lansoprazole (PREVACID SOLUTAB) disintegrating tablet 30 mg  30 mg Oral QAM AC    ipratropium 0.5 mg-albuterol 2.5 mg (DUONEB) nebulizer solution 1 Dose  1 Dose Inhalation Q4H WA RT    labetalol (NORMODYNE;TRANDATE) injection syringe 10 mg  10 mg IntraVENous Q4H PRN    QUEtiapine (SEROQUEL) tablet 50 mg  50 mg Oral BID    amLODIPine (NORVASC) tablet 10 mg  10 mg Oral Daily    alcohol 62% (NOZIN) nasal  1 ampule  1 ampule Nasal

## 2024-06-28 NOTE — PROGRESS NOTES
End of Shift Note    Bedside shift change report given to CHANDANA Richardson (oncoming nurse) by Hermelinda Horvath RN (offgoing nurse).  Report included the following information SBAR, Kardex, ED Summary, Procedure Summary, Intake/Output, MAR, Accordion, Recent Results, Med Rec Status, Cardiac Rhythm NSR, and Alarm Parameters     Shift worked:  [id number]     Shift summary and any significant changes:     Uneventful shift     Concerns for physician to address:       Zone phone for oncoming shift:          Activity:     Number times ambulated in hallways past shift: 0  Number of times OOB to chair past shift: 1    Cardiac:   Cardiac Monitoring: Yes           Access:  Current line(s): PIV     Genitourinary:   Urinary status: voiding    Respiratory:      Chronic home O2 use?: YES  Incentive spirometer at bedside: YES       GI:     Current diet:  ADULT DIET; Regular  Passing flatus: YES  Tolerating current diet: YES       Pain Management:   Patient states pain is manageable on current regimen: YES    Skin:     Interventions: float heels, increase time out of bed, and PT/OT consult    Patient Safety:  Fall Score:    Interventions: bed/chair alarm, assistive device (walker, cane. etc), gripper socks, pt to call before getting OOB, and stay with me (per policy)       Length of Stay:  Expected LOS: 12  Actual LOS: 11      Hermelinda Horvath RN

## 2024-06-28 NOTE — PROGRESS NOTES
Bedside shift change report given to Hermelinda Horvath RN (oncoming nurse) by Jasmyne Mora RN  (offgoing nurse). Report included the following information Nurse Handoff Report, Index, ED Encounter Summary, ED SBAR, Adult Overview, Surgery Report, Intake/Output, MAR, Recent Results, Med Rec Status, Cardiac Rhythm NSR, and Alarm Parameters.

## 2024-06-28 NOTE — PLAN OF CARE
RN  Verbalizes/displays adequate comfort level or baseline comfort level: Encourage patient to monitor pain and request assistance  Taken 6/27/2024 1049 by Jasmyne Mora RN  Verbalizes/displays adequate comfort level or baseline comfort level: Encourage patient to monitor pain and request assistance  6/27/2024 0818 by Jasmyne Mora RN  Outcome: Progressing  Flowsheets (Taken 6/27/2024 0745)  Verbalizes/displays adequate comfort level or baseline comfort level: Encourage patient to monitor pain and request assistance     Problem: Chronic Conditions and Co-morbidities  Goal: Patient's chronic conditions and co-morbidity symptoms are monitored and maintained or improved  Outcome: Progressing  Flowsheets (Taken 6/27/2024 1910)  Care Plan - Patient's Chronic Conditions and Co-Morbidity Symptoms are Monitored and Maintained or Improved: Monitor and assess patient's chronic conditions and comorbid symptoms for stability, deterioration, or improvement     Problem: Nutrition Deficit:  Goal: Optimize nutritional status  Outcome: Progressing     Problem: ABCDS Injury Assessment  Goal: Absence of physical injury  Outcome: Progressing  Flowsheets (Taken 6/27/2024 1910)  Absence of Physical Injury: Implement safety measures based on patient assessment     Problem: Physical Therapy - Adult  Goal: By Discharge: Performs mobility at highest level of function for planned discharge setting.  See evaluation for individualized goals.  Description: FUNCTIONAL STATUS PRIOR TO ADMISSION: Patient was modified independent without AD for functional mobility.  Patient is not a reliable historian, but reports she ambulated household distances without assistance and denies falls.  Has history of daily drinking and positive for cocaine on admission.      HOME SUPPORT PRIOR TO ADMISSION: The patient lived with family who can assist as needed.    Physical Therapy Goals  Initiated 6/24/2024  1.  Patient will move from supine to sit and sit  techniques during functional activities with verbal, visual, and tactile cues within 7 day(s).    6/27/2024 1236 by Kerley, Matthew, OT  Outcome: Progressing     Problem: Confusion  Goal: Confusion, delirium, dementia, or psychosis is improved or at baseline  Description: INTERVENTIONS:  1. Assess for possible contributors to thought disturbance, including medications, impaired vision or hearing, underlying metabolic abnormalities, dehydration, psychiatric diagnoses, and notify attending LIP  2. [place] high risk fall precautions, as indicated  3. Provide frequent short contacts to provide reality reorientation, refocusing and direction  4. Decrease environmental stimuli, including noise as appropriate  5. Monitor and intervene to maintain adequate nutrition, hydration, elimination, sleep and activity  6. If unable to ensure safety without constant attention obtain sitter and review sitter guidelines with assigned personnel  7. Initiate Psychosocial CNS and Spiritual Care consult, as indicated  Outcome: Progressing     Problem: Neurosensory - Adult  Goal: Achieves stable or improved neurological status  Outcome: Progressing  Flowsheets  Taken 6/27/2024 1910 by Hermelinda Horvath RN  Achieves stable or improved neurological status: Assess for and report changes in neurological status  Taken 6/27/2024 0745 by Jasmyne Mora RN  Achieves stable or improved neurological status: Assess for and report changes in neurological status  Goal: Achieves maximal functionality and self care  Outcome: Progressing     Problem: Cardiovascular - Adult  Goal: Maintains optimal cardiac output and hemodynamic stability  Outcome: Progressing  Flowsheets  Taken 6/27/2024 1910 by Hermelinda Horvath RN  Maintains optimal cardiac output and hemodynamic stability: Monitor blood pressure and heart rate  Taken 6/27/2024 0745 by Jasmyne Mora RN  Maintains optimal cardiac output and hemodynamic stability: Monitor blood pressure and heart

## 2024-06-28 NOTE — PLAN OF CARE
Problem: Physical Therapy - Adult  Goal: By Discharge: Performs mobility at highest level of function for planned discharge setting.  See evaluation for individualized goals.  Description: FUNCTIONAL STATUS PRIOR TO ADMISSION: Patient was modified independent without AD for functional mobility.  Patient is not a reliable historian, but reports she ambulated household distances without assistance and denies falls.  Has history of daily drinking and positive for cocaine on admission.      HOME SUPPORT PRIOR TO ADMISSION: The patient lived with family who can assist as needed.    Physical Therapy Goals  Initiated 6/24/2024  1.  Patient will move from supine to sit and sit to supine in bed with independence within 7 day(s).    2.  Patient will perform sit to stand with supervision/set-up within 7 day(s).  3.  Patient will transfer from bed to chair and chair to bed with supervision/set-up using the least restrictive device within 7 day(s).  4.  Patient will ambulate with supervision/set-up for 150 feet with the least restrictive device within 7 day(s).   5.  Patient will ascend/descend 3 stairs with  handrail(s) with contact guard assist within 7 day(s).   Outcome: Progressing   PHYSICAL THERAPY TREATMENT    Patient: Li Casanova (63 y.o. female)  Date: 6/28/2024  Diagnosis: Shortness of breath [R06.02]  Acute exacerbation of chronic obstructive pulmonary disease (COPD) (HCC) [J44.1]  COPD exacerbation (HCC) [J44.1] Acute exacerbation of chronic obstructive pulmonary disease (COPD) (HCC)      Precautions: Fall Risk, Seizure                      ASSESSMENT:  Patient continues to benefit from skilled PT services and is slowly progressing towards goals. Patient received up in chair, sleepy but agreeable to participate.  Patient ambulated into bathroom with RW and min assist, required cues for safety and positioning when toileting and to perform functional reaching activities at the sink.  Patient ambulated x 80 feet  Assistance required to correct errors made;Assistance required to implement solutions;Assistance required to generate solutions;Assistance required to identify errors made;Decreased awareness of errors  Insights: Decreased awareness of deficits  Initiation: Requires cues for some  Sequencing: Requires cues for some    Functional Mobility Training:  Bed Mobility:  Bed Mobility Training  Bed Mobility Training: Yes  Rolling: Modified independent  Supine to Sit: Modified independent  Scooting: Modified independent  Transfers:  Transfer Training  Transfer Training: Yes  Interventions: Safety awareness training;Verbal cues  Sit to Stand: Stand-by assistance  Stand to Sit: Stand-by assistance  Bed to Chair: Contact-guard assistance  Balance:  Balance  Sitting: Intact  Standing: Impaired  Standing - Static: Good;Constant support  Standing - Dynamic: Good;Fair;Constant support   Ambulation/Gait Training:     Gait  Overall Level of Assistance: Minimum assistance;Assist X1  Distance (ft): 80 Feet  Assistive Device: Gait belt;Walker, rolling  Base of Support: Widened  Speed/Karin: Pace decreased (< 100 feet/min)  Step Length: Left shortened;Right shortened  Gait Abnormalities: Trunk sway increased        Neuro Re-Education:                      Activity Tolerance:   Good    After treatment:   Patient left in no apparent distress sitting up in chair, Call bell within reach, and Bed/ chair alarm activated      COMMUNICATION/EDUCATION:   The patient's plan of care was discussed with: occupational therapist and registered nurse    Patient Education  Education Given To: Patient  Education Provided: Role of Therapy;Plan of Care;Fall Prevention Strategies;Home Exercise Program  Education Method: Verbal;Demonstration  Barriers to Learning: Cognition  Education Outcome: Continued education needed      Lori Joseph, PT  Minutes: 37

## 2024-06-28 NOTE — CARE COORDINATION
Transition of Care Plan:     RUR: 28% (high RUR)  Prior Level of Functioning: Assistance with ADLs  Disposition: [facility] under Medicaid, then return home with family (cousin is looking into CGs); substance resources listed on AVS  If SNF or IPR: Date FOC offered: 6/24, 6/26  Date FOC received: 6/24 - IPR ([facility]); 6/26 - SNF - any accepting facility  Accepting facility: [facility], [facility]  Date authorization started with reference number: N/A  Date authorization received and expires: N/A  Follow up appointments: defer to rehab.  DME needed: defer to rehab.  Patient has 2L O2 NC and cane at home.  Transportation at discharge: Stretcher anticipated to be needed.  IM/IMM Medicare/ letter given: N/A Medicaid  Is patient a  and connected with VA? No              If yes, was  transfer form completed and VA notified? N/A  Caregiver Contact: Amaya Freire - teresasin - [phone]  Discharge Caregiver contacted prior to discharge? Caregiver to be contacted prior to discharge.   Care Conference needed? No  Barriers to discharge: CIWA, Hgb/HR stability, GI clearance    1229 - CM attempted to reach [facility] liaison, unable to reach them at this time.  CM sent message to [facility] via [facility] regarding TOM tomorrow vs. next week depending on GI interventions.    BISI BlairN, RN    Care Management  [phone]

## 2024-06-28 NOTE — PROGRESS NOTES
Hospitalist Progress Note    NAME:   Li Casanova   : 1960   MRN: [id number]     Date/Time: 2024    Patient PCP: Star Napier MD    Icu course :  62 y/o female PMHx of CAD, HTN, CVA  COPD/asthma (on chronic home 2L) chronic tobacco use disorder and polysubstance abuse use admitted () by hospital medicine for COPD exacerbation after presenting with acute onset of shortness of breath.  CTA chest-negative for PE.  She was started on systemic steroids and bronchodilators.  Per reports she has a history of daily alcohol use as well as history of polysubstance abuse.  Her UDS was positive for cocaine.  Rapid response was called due to tachycardia (140s), worsening tachypnea with RR in the 40s with diffuse wheezing throughout as well as increased agitation. Patient was transferred to the ICU, started on Precedex infusion, phenobarbital taper and diltiazem infusion.      : resting comfortably on the ventilator  : deeply sedated  : Failed SAT/SBT this morning, currently on Precedex 1.2, propofol 40, on phenobarbital taper and started on scheduled Seroquel . Triglycerides 462, re-check in AM, may need to stop propofol Continue to assess mentation for appropriateness for SAT/SBT, if fails cuff leak, will need to consider early trach.  : Patient remains intubated and sedated. Intermittently very agitated when sedation weaning is attempted, has copious oral secretion   : patient was taken off ventilator and et tube removed and transferred to floors .     Assessment / Plan:      Acute hypoxic/hypercapnic respiratory failure requiring emergent intubation - in the setting of COPD/asthma exacerbation, with inability to protect airways in the setting tox metabolic encephalopathy due to illicit drug ingestion/withdrawal. (Of note small glottic opening, INTUBATED WITH 7.0 ETT )  CTA chest-leg PE, emphysema with extensive peripheral scarring and subsegmental atelectasis, pulmonary  mg, 1.25 mg, Nebulization, Q4H PRN, Gordo Fairbanks MD, 1.25 mg at 06/24/24 0536    fentaNYL (SUBLIMAZE) injection 25 mcg, 25 mcg, IntraVENous, Q6H PRN, Heavenrkaur, Stephany B, APRN - NP, 25 mcg at 06/21/24 2210    glucose chewable tablet 16 g, 4 tablet, Oral, PRN, Heavenridge, Stephany B, APRN - NP    dextrose bolus 10% 125 mL, 125 mL, IntraVENous, PRN **OR** dextrose bolus 10% 250 mL, 250 mL, IntraVENous, PRN, Heavenridge, Stephany B, APRN - NP    glucagon injection 1 mg, 1 mg, SubCUTAneous, PRN, Heavenridge, Stephany B, APRN - NP    dextrose 10 % infusion, , IntraVENous, Continuous PRN, Heavenridge, Stephany B, APRN - NP    clopidogrel (PLAVIX) tablet 75 mg, 75 mg, Orogastric, Daily, Heavenridge, Stephany B, APRN - NP, 75 mg at 06/23/24 0901    aspirin chewable tablet 81 mg, 81 mg, Orogastric, Daily, Heavenridge, Stephany B, APRN - NP, 81 mg at 06/23/24 0901    atorvastatin (LIPITOR) tablet 20 mg, 20 mg, Orogastric, Daily, Heavenridge, Stephany B, APRN - NP, 20 mg at 06/28/24 1047       LABS:    I reviewed today's most current labs and imaging studies.    Pertinent labs include:  Recent Labs     06/26/24  0037 06/27/24  0951 06/28/24  0548   WBC 15.0* 12.8* 16.2*   HGB 7.5* 7.8* 8.4*   HCT 24.5* 25.7* 27.8*    317 353       Recent Labs     06/26/24  0037 06/27/24  0528    143   K 3.3* 3.1*   * 112*   CO2 28 26   GLUCOSE 109* 131*   BUN 11 7   CREATININE 0.69 0.67   CALCIUM 9.4 9.6       Xray Result (most recent):  US ABDOMEN COMPLETE  Narrative: INDICATION: alcoholic, evaluate for cirrhosis     EXAM: US Abdomen    FINDINGS:   Sonography of the abdomen shows a normal gallbladder without stones or wall  thickening.  The bile ducts are not enlarged.  The liver appears normal. Portal  and hepatic veins demonstrate antegrade flow. There is no ascites. There is no  splenomegaly.     The pancreas is obscured by bowel gas. Aorta is not aneurysmal. IVC is  unremarkable. The kidneys show no evident mass and are normal

## 2024-06-28 NOTE — PROGRESS NOTES
0700 Bedside and Verbal shift change report given to Debra ELLINGTON (oncoming nurse) by Hermelinda ELLINGTON (offgoing nurse). Report included the following information Nurse Handoff Report, Index, ED Encounter Summary, ED SBAR, Intake/Output, MAR, Recent Results, and Cardiac Rhythm Sinus Rhythm, Sinus Tach .     1115 Patient YOLANDA to US.     End of Shift Note    Bedside shift change report given to Bernie ELLINGTON (oncoming nurse) by Debra Lucas RN (offgoing nurse).  Report included the following information SBAR, Kardex, ED Summary, Intake/Output, MAR, Recent Results, and Cardiac Rhythm NSR, Sinus Tach    Shift worked:  7a to 7p     Shift summary and any significant changes:     See above    Had US ABD today  IV steroids d/c'ed     Concerns for physician to address:        Zone phone for oncoming shift:           Activity:     Number times ambulated in hallways past shift: 0  Number of times OOB to chair past shift: 2    Cardiac:   Cardiac Monitoring: Yes           Access:  Current line(s): PIV     Genitourinary:   Urinary status: voiding    Respiratory:      Chronic home O2 use?: NO  Incentive spirometer at bedside: NO       GI:     Current diet:  ADULT DIET; Regular  Passing flatus: YES  Tolerating current diet: YES       Pain Management:   Patient states pain is manageable on current regimen: YES    Skin:     Interventions: float heels, increase time out of bed, foam dressing, PT/OT consult, limit briefs, internal/external urinary devices, and nutritional support    Patient Safety:  Fall Score:    Interventions: bed/chair alarm, assistive device (walker, cane. etc), gripper socks, pt to call before getting OOB, stay with me (per policy), and gait belt       Length of Stay:  Expected LOS: 13  Actual LOS: 12      Debra Lucas RN

## 2024-06-28 NOTE — PLAN OF CARE
Problem: Occupational Therapy - Adult  Goal: By Discharge: Performs self-care activities at highest level of function for planned discharge setting.  See evaluation for individualized goals.  Description: FUNCTIONAL STATUS PRIOR TO ADMISSION:  Patient requires assistance for ADLs, ambulates without AD. History of CVA in 2023.    Receives Help From: Family, Personal care attendant (per chart review, recently approved for caregivers for 4 hours/day x 7 days/week), ADL Assistance: Needs assistance (per chart review, cousin assists with bathing and dressing), Ambulation Assistance: Independent, Transfer Assistance: Independent, Active : No     HOME SUPPORT: Patient lived with cousin to provide assistance for ADLs.    Occupational Therapy Goals:  Initiated 6/24/2024  1.  Patient will perform grooming with Contact Guard Assist within 7 day(s).  2.  Patient will perform upper body dressing with Minimal Assist within 7 day(s).  3.  Patient will perform lower body dressing with Moderate Assist within 7 day(s).  4.  Patient will perform toilet transfers with Contact Guard Assist  within 7 day(s).  5.  Patient will perform all aspects of toileting with Moderate Assist within 7 day(s).  6.  Patient will participate in upper extremity therapeutic exercise/activities with Contact Guard Assist for 5 minutes within 7 day(s).    7.  Patient will utilize energy conservation techniques during functional activities with verbal, visual, and tactile cues within 7 day(s).    Outcome: Progressing     OCCUPATIONAL THERAPY TREATMENT  Patient: Li Casanova (63 y.o. female)  Date: 6/28/2024  Primary Diagnosis: Shortness of breath [R06.02]  Acute exacerbation of chronic obstructive pulmonary disease (COPD) (HCC) [J44.1]  COPD exacerbation (HCC) [J44.1]       Precautions: Fall Risk, Seizure                Chart, occupational therapy assessment, plan of care, and goals were reviewed.    ASSESSMENT  Patient continues to benefit from

## 2024-06-28 NOTE — RT PROTOCOL NOTE
ADULT PROTOCOL: JET AEROSOL ASSESSMENT    Patient  Li Casanova     63 y.o.   female     6/28/2024  11:33 AM    Breath Sounds Pre Procedure: Breath Sounds Pre-Tx VANESSA: Clear                                  Breath Sounds Pre-Tx LLL: Clear        Breath Sounds Pre-Tx RUL: Clear        Breath Sounds Pre-Tx RML: Clear        Breath Sounds Pre-Tx RLL: Clear  Breath Sounds Post Procedure: Breath Sounds Post-Tx VANESSA: Clear          Breath Sounds Post-Tx LLL: Clear          Breath Sounds Post-Tx RUL: Clear          Breath Sounds Post-Tx RML: Clear          Breath Sounds Post-Tx RLL: Clear                                       Heart Rate: Pre procedure Pre-Tx Pulse: 93           Post procedure Post-Tx Pulse: 88    Resp Rate: Pre procedure Pre-Tx Resps: 16           Post procedure Post-Tx Resps: 15      Oxygen: O2 Therapy: Oxygen   nasal cannula     Changed: No    SpO2:  SpO2: 96 %   without Oxygen                Nebulizer Therapy: Current medications Medications: Albuterol/Ipratropium      Changed: Yes    Comments: made PRN at this time. BS clear, on RA.     Problem List:   Patient Active Problem List   Diagnosis    NSTEMI (non-ST elevated myocardial infarction) ([facility])    COPD (chronic obstructive pulmonary disease) ([facility])    Acute exacerbation of chronic obstructive pulmonary disease (COPD) ([facility])       Respiratory Therapist: Christopher Frances, RT

## 2024-06-29 VITALS
HEIGHT: 59 IN | TEMPERATURE: 98 F | OXYGEN SATURATION: 95 % | WEIGHT: 129 LBS | DIASTOLIC BLOOD PRESSURE: 81 MMHG | BODY MASS INDEX: 26 KG/M2 | RESPIRATION RATE: 19 BRPM | SYSTOLIC BLOOD PRESSURE: 100 MMHG | HEART RATE: 83 BPM

## 2024-06-29 LAB
ANION GAP SERPL CALC-SCNC: 4 MMOL/L (ref 5–15)
BASOPHILS # BLD: 0.2 K/UL (ref 0–0.1)
BASOPHILS NFR BLD: 1 % (ref 0–1)
BUN SERPL-MCNC: 13 MG/DL (ref 6–20)
BUN/CREAT SERPL: 21 (ref 12–20)
CALCIUM SERPL-MCNC: 9.2 MG/DL (ref 8.5–10.1)
CHLORIDE SERPL-SCNC: 113 MMOL/L (ref 97–108)
CO2 SERPL-SCNC: 25 MMOL/L (ref 21–32)
CREAT SERPL-MCNC: 0.63 MG/DL (ref 0.55–1.02)
DIFFERENTIAL METHOD BLD: ABNORMAL
EOSINOPHIL # BLD: 0 K/UL (ref 0–0.4)
EOSINOPHIL NFR BLD: 0 % (ref 0–7)
ERYTHROCYTE [DISTWIDTH] IN BLOOD BY AUTOMATED COUNT: 24.4 % (ref 11.5–14.5)
GLUCOSE SERPL-MCNC: 96 MG/DL (ref 65–100)
HCT VFR BLD AUTO: 30.6 % (ref 35–47)
HGB BLD-MCNC: 9.2 G/DL (ref 11.5–16)
IMM GRANULOCYTES # BLD AUTO: 0 K/UL (ref 0–0.04)
IMM GRANULOCYTES NFR BLD AUTO: 0 % (ref 0–0.5)
LYMPHOCYTES # BLD: 3.8 K/UL (ref 0.8–3.5)
LYMPHOCYTES NFR BLD: 24 % (ref 12–49)
MCH RBC QN AUTO: 28.3 PG (ref 26–34)
MCHC RBC AUTO-ENTMCNC: 30.1 G/DL (ref 30–36.5)
MCV RBC AUTO: 94.2 FL (ref 80–99)
METAMYELOCYTES NFR BLD MANUAL: 2 %
MONOCYTES # BLD: 0.6 K/UL (ref 0–1)
MONOCYTES NFR BLD: 4 % (ref 5–13)
NEUTS BAND NFR BLD MANUAL: 1 %
NEUTS SEG # BLD: 10.8 K/UL (ref 1.8–8)
NEUTS SEG NFR BLD: 68 % (ref 32–75)
NRBC # BLD: 0.1 K/UL (ref 0–0.01)
NRBC BLD-RTO: 0.6 PER 100 WBC
PLATELET # BLD AUTO: 352 K/UL (ref 150–400)
PMV BLD AUTO: 10 FL (ref 8.9–12.9)
POTASSIUM SERPL-SCNC: 3.8 MMOL/L (ref 3.5–5.1)
PROCALCITONIN SERPL-MCNC: <0.05 NG/ML
RBC # BLD AUTO: 3.25 M/UL (ref 3.8–5.2)
RBC MORPH BLD: ABNORMAL
RBC MORPH BLD: ABNORMAL
SODIUM SERPL-SCNC: 142 MMOL/L (ref 136–145)
WBC # BLD AUTO: 15.7 K/UL (ref 3.6–11)

## 2024-06-29 PROCEDURE — 36415 COLL VENOUS BLD VENIPUNCTURE: CPT

## 2024-06-29 PROCEDURE — 80048 BASIC METABOLIC PNL TOTAL CA: CPT

## 2024-06-29 PROCEDURE — 6370000000 HC RX 637 (ALT 250 FOR IP): Performed by: INTERNAL MEDICINE

## 2024-06-29 PROCEDURE — 94640 AIRWAY INHALATION TREATMENT: CPT

## 2024-06-29 PROCEDURE — 2580000003 HC RX 258: Performed by: STUDENT IN AN ORGANIZED HEALTH CARE EDUCATION/TRAINING PROGRAM

## 2024-06-29 PROCEDURE — 85025 COMPLETE CBC W/AUTO DIFF WBC: CPT

## 2024-06-29 PROCEDURE — 6370000000 HC RX 637 (ALT 250 FOR IP): Performed by: HOSPITALIST

## 2024-06-29 PROCEDURE — 2580000003 HC RX 258: Performed by: GENERAL ACUTE CARE HOSPITAL

## 2024-06-29 PROCEDURE — 6370000000 HC RX 637 (ALT 250 FOR IP): Performed by: GENERAL ACUTE CARE HOSPITAL

## 2024-06-29 PROCEDURE — 84145 PROCALCITONIN (PCT): CPT

## 2024-06-29 PROCEDURE — 6370000000 HC RX 637 (ALT 250 FOR IP): Performed by: NURSE PRACTITIONER

## 2024-06-29 PROCEDURE — 6360000002 HC RX W HCPCS: Performed by: STUDENT IN AN ORGANIZED HEALTH CARE EDUCATION/TRAINING PROGRAM

## 2024-06-29 RX ORDER — LANSOPRAZOLE 30 MG/1
30 TABLET, ORALLY DISINTEGRATING, DELAYED RELEASE ORAL
Qty: 30 TABLET | Refills: 1 | Status: SHIPPED | OUTPATIENT
Start: 2024-06-30 | End: 2024-08-29

## 2024-06-29 RX ORDER — QUETIAPINE FUMARATE 50 MG/1
50 TABLET, FILM COATED ORAL 2 TIMES DAILY
Qty: 60 TABLET | Refills: 0 | Status: SHIPPED | OUTPATIENT
Start: 2024-06-29 | End: 2024-07-29

## 2024-06-29 RX ORDER — AMLODIPINE BESYLATE 5 MG/1
2.5 TABLET ORAL DAILY
Status: DISCONTINUED | OUTPATIENT
Start: 2024-06-30 | End: 2024-06-29 | Stop reason: HOSPADM

## 2024-06-29 RX ORDER — THIAMINE MONONITRATE (VIT B1) 100 MG
100 TABLET ORAL DAILY
Qty: 30 TABLET | Refills: 0 | Status: SHIPPED
Start: 2024-06-30 | End: 2024-07-30

## 2024-06-29 RX ADMIN — Medication 100 MG: at 09:43

## 2024-06-29 RX ADMIN — FOLIC ACID 1 MG: 1 TABLET ORAL at 09:43

## 2024-06-29 RX ADMIN — BUDESONIDE 250 MCG: 0.25 INHALANT RESPIRATORY (INHALATION) at 07:45

## 2024-06-29 RX ADMIN — LANSOPRAZOLE 30 MG: 30 TABLET, ORALLY DISINTEGRATING, DELAYED RELEASE ORAL at 07:04

## 2024-06-29 RX ADMIN — SODIUM CHLORIDE, PRESERVATIVE FREE 10 ML: 5 INJECTION INTRAVENOUS at 09:45

## 2024-06-29 RX ADMIN — QUETIAPINE FUMARATE 50 MG: 25 TABLET ORAL at 09:43

## 2024-06-29 RX ADMIN — ARFORMOTEROL TARTRATE 15 MCG: 15 SOLUTION RESPIRATORY (INHALATION) at 07:45

## 2024-06-29 RX ADMIN — GUAIFENESIN 600 MG: 600 TABLET, EXTENDED RELEASE ORAL at 09:42

## 2024-06-29 RX ADMIN — ATORVASTATIN CALCIUM 20 MG: 20 TABLET, FILM COATED ORAL at 09:43

## 2024-06-29 RX ADMIN — ASPIRIN 81 MG: 81 TABLET, CHEWABLE ORAL at 09:43

## 2024-06-29 RX ADMIN — SERTRALINE 50 MG: 50 TABLET, FILM COATED ORAL at 09:42

## 2024-06-29 RX ADMIN — CLOPIDOGREL BISULFATE 75 MG: 75 TABLET ORAL at 09:43

## 2024-06-29 RX ADMIN — 0.12% CHLORHEXIDINE GLUCONATE 15 ML: 1.2 RINSE ORAL at 09:42

## 2024-06-29 RX ADMIN — Medication 1 AMPULE: at 09:45

## 2024-06-29 ASSESSMENT — PAIN SCALES - GENERAL
PAINLEVEL_OUTOF10: 0

## 2024-06-29 NOTE — PLAN OF CARE
Problem: Discharge Planning  Goal: Discharge to home or other facility with appropriate resources  Outcome: Progressing     Problem: Skin/Tissue Integrity  Goal: Absence of new skin breakdown  Description: 1.  Monitor for areas of redness and/or skin breakdown  2.  Assess vascular access sites hourly  3.  Every 4-6 hours minimum:  Change oxygen saturation probe site  4.  Every 4-6 hours:  If on nasal continuous positive airway pressure, respiratory therapy assess nares and determine need for appliance change or resting period.  Outcome: Progressing     Problem: Safety - Adult  Goal: Free from fall injury  Outcome: Progressing     Problem: Respiratory - Adult  Goal: Achieves optimal ventilation and oxygenation  6/29/2024 1059 by Debra Lucas, RN  Outcome: Progressing  6/29/2024 0804 by Dayana Briceno, RT  Outcome: Progressing     Problem: Pain  Goal: Verbalizes/displays adequate comfort level or baseline comfort level  Outcome: Progressing  Flowsheets (Taken 6/28/2024 2310 by Bernie Kincaid, RN)  Verbalizes/displays adequate comfort level or baseline comfort level: Encourage patient to monitor pain and request assistance     Problem: Chronic Conditions and Co-morbidities  Goal: Patient's chronic conditions and co-morbidity symptoms are monitored and maintained or improved  Outcome: Progressing     Problem: Nutrition Deficit:  Goal: Optimize nutritional status  Outcome: Progressing     Problem: ABCDS Injury Assessment  Goal: Absence of physical injury  Outcome: Progressing     Problem: Confusion  Goal: Confusion, delirium, dementia, or psychosis is improved or at baseline  Description: INTERVENTIONS:  1. Assess for possible contributors to thought disturbance, including medications, impaired vision or hearing, underlying metabolic abnormalities, dehydration, psychiatric diagnoses, and notify attending LIP  2. [place] high risk fall precautions, as indicated  3. Provide frequent short contacts to

## 2024-06-29 NOTE — CARE COORDINATION
Pt clear from CM standpoint - [facility] transport @ 1500PM.    Transition of Care Plan:     RUR: 27% \"high risk\"  Prior Level of Functioning: Needs assistance with ADL's  Disposition: [facility] under Medicaid, then return home with family   Room #: 30B  Report: [phone]  If SNF or IPR: Date FOC offered: 6/24, 6/26  Date FOC received: 6/24 - IPR ([facility]); 6/26 - SNF - any accepting facility  Accepting facility: [facility], [facility]  Date authorization started with reference number: N/A  Date authorization received and expires: N/A  Follow up appointments: defer to facility.  DME needed: defer to facility.  Patient has 2L O2 NC and cane at home.  Transportation at discharge: [facility] transport arranged for 1500PM  IM/IMM Medicare/ letter given: N/A - Medicaid  Is patient a [place] and connected with VA? No              If yes, was [place] transfer form completed and VA notified? N/A  Caregiver Contact: Amayaruddy Ortegaximena - dara - [phone]  Discharge Caregiver contacted prior to discharge? CM left voicemail for caregiver  Care Conference needed? No  Barriers to discharge: None     Transition of Care Plan to SNF/Rehab    Communication to Patient/Family:  Met with patient and family and they are agreeable to the transition plan. The Plan for Transition of Care is related to the following treatment goals: [facility].    The Patient and/or patient representative was provided with a choice of provider and agrees  with the discharge plan.      Yes [x] No []    A Freedom of choice list was provided with basic dialogue that supports the patient's individualized plan of care/goals and shares the quality data associated with the providers.       Yes [x] No []    SNF/Rehab Transition:  Patient has been accepted to [facility] and meets criteria for admission.   [facility] will transported by [facility] and expected to leave at 1500PM.    Communication to  facility  [] Individuals who are pateints or residents of a state owned/operated facility that is licensed by Department of Behavioral Services (DBHDS) and seek direct admission to VA nursing facility  [] A screening not required for enrollment in Medicaid Hospice services as set out in 12 VAC 30-  [] [facility] ([facility]) staff shall perform screenings of the [facility] clients.    Advanced Care Plan:  [x]Surrogate Decision Maker of Care  Amaya Freire (caregiver), [phone]  []POA  [x]Communicated Code Status and copy sent.  FULL Code.    Other:          06/29/24 130   Services At/After Discharge   Transition of Care Consult (CM Consult) Discharge Planning   Services At/After Discharge Skilled Nursing Facility (SNF)    Resource Information Provided? No   Mode of Transport at Discharge BLS   Hospital Transport Time of Discharge 1500   Confirm Follow Up Transport Other (see comment)  ([facility])   Condition of Participation: Discharge Planning   The Plan for Transition of Care is related to the following treatment goals: Go to SNF, then return home with family   The Patient and/or Patient Representative was provided with a Choice of Provider? Patient   The Patient and/Or Patient Representative agree with the Discharge Plan? Yes   Freedom of Choice list was provided with basic dialogue that supports the patient's individualized plan of care/goals, treatment preferences, and shares the quality data associated with the providers?  Yes     MARII Merlos  Care Management  [facility]   [id number]

## 2024-06-29 NOTE — PLAN OF CARE
Problem: Discharge Planning  Goal: Discharge to home or other facility with appropriate resources  6/29/2024 1453 by Debra Lucas RN  Outcome: Adequate for Discharge  6/29/2024 1059 by Debra Lucas RN  Outcome: Progressing     Problem: Skin/Tissue Integrity  Goal: Absence of new skin breakdown  Description: 1.  Monitor for areas of redness and/or skin breakdown  2.  Assess vascular access sites hourly  3.  Every 4-6 hours minimum:  Change oxygen saturation probe site  4.  Every 4-6 hours:  If on nasal continuous positive airway pressure, respiratory therapy assess nares and determine need for appliance change or resting period.  6/29/2024 1453 by Debra Lucas RN  Outcome: Adequate for Discharge  6/29/2024 1059 by Debra Lucas RN  Outcome: Progressing     Problem: Safety - Adult  Goal: Free from fall injury  6/29/2024 1453 by Debra Lucas RN  Outcome: Adequate for Discharge  6/29/2024 1059 by Debra Lucas RN  Outcome: Progressing     Problem: Respiratory - Adult  Goal: Achieves optimal ventilation and oxygenation  6/29/2024 1453 by Debra Lucas RN  Outcome: Adequate for Discharge  6/29/2024 1059 by Debra Lucas RN  Outcome: Progressing  6/29/2024 0804 by Dayana Briceno, RT  Outcome: Progressing     Problem: Pain  Goal: Verbalizes/displays adequate comfort level or baseline comfort level  6/29/2024 1453 by Debra Lucas RN  Outcome: Adequate for Discharge  6/29/2024 1059 by Debra Lucas RN  Outcome: Progressing  Flowsheets (Taken 6/28/2024 2319 by Bernie Kincaid RN)  Verbalizes/displays adequate comfort level or baseline comfort level: Encourage patient to monitor pain and request assistance     Problem: Chronic Conditions and Co-morbidities  Goal: Patient's chronic conditions and co-morbidity symptoms are monitored and maintained or improved  6/29/2024 1453 by Debra Lucas RN  Outcome: Adequate for Discharge  6/29/2024 1059 by Debra Lucas RN  Outcome: Progressing

## 2024-06-29 NOTE — PROGRESS NOTES
0700 Bedside and Verbal shift change report given to Debra RN (oncoming nurse) by Bernie RN (offgoing nurse). Report included the following information Nurse Handoff Report, Index, ED Encounter Summary, ED SBAR, Intake/Output, MAR, Recent Results, and Cardiac Rhythm NSR, Sinus Tach .     0944 Patient's /58, HR 98. Scheduled amlodipine held. Notified Neto FATIMA    1321 Telephone SBAR report given to Gabriela Chacon LPN at [facility].     1452 [facility] at bedside to  patient. Reviewed discharge instructions and education with patient. Allowed opportunity for questions. Patient discharged to Skilled nursing facility via  EMS transportation

## 2024-06-29 NOTE — DISCHARGE SUMMARY
MG tablet  Commonly known as: DESYREL     Vitron-C  MG Tabs  Generic drug: iron-vitamin C           * This list has 3 medication(s) that are the same as other medications prescribed for you. Read the directions carefully, and ask your doctor or other care provider to review them with you.                STOP taking these medications      amLODIPine 2.5 MG tablet  Commonly known as: NORVASC     hydroCHLOROthiazide 25 MG tablet  Commonly known as: HYDRODIURIL               Where to Get Your Medications        These medications were sent to [id number] - [place], [place] - [address] [facility] - P [phone] - F [phone]  [facility] [facility] [address]      Phone: [phone]   lansoprazole 30 MG disintegrating tablet  QUEtiapine 50 MG tablet       Information about where to get these medications is not yet available    Ask your nurse or doctor about these medications  metoprolol tartrate 25 MG tablet  vitamin B-1 100 MG tablet             DISPOSITION:    Home with Family:       Home with HH/PT/OT/RN:    SNF/LTC:    JOSSELYN:    OTHER:            Code status: full   Recommended diet: regular diet  Recommended activity: activity as tolerated  Wound care: None      Follow up with:   PCP : Star Napier MD Brown, Leon J, MD  [address] [place], [place] [id number] [phone]    Schedule an appointment as soon as possible for a visit       [facility]   Address: [facility] ([address] [place], [address])    Phone: [phone] or [phone]    Fax: [phone]  Follow up  Please contact for assistace with social service needs.    [facility]  Address: [address], [place] [address]    Phone: [phone]  Follow up  Please contact for assistance with substance use.    [facility]: Earl Rodriguez  Phone: [phone]  Fax: [phone]  Follow up  Please contact for assistance with substance use.    Melanie  [facility] (LINK)  [address] [place] [address] [phone]              Total time in minutes spent coordinating this discharge (includes going over instructions, follow-up, prescriptions, and preparing report for sign off to her PCP) :  35 minutes

## 2024-06-29 NOTE — PROGRESS NOTES
Discharge Summary     Patient: Li Casanova MRN: [id number]  SSN: [id number]    YOB: 1960  Age: 63 y.o.  Sex: female       Code Status: Full  Allergies: No Known Allergies    Admit Date: 6/16/2024    Discharge Date: 6/29/2024      Admission Diagnoses: Shortness of breath [R06.02]  Acute exacerbation of chronic obstructive pulmonary disease (COPD) ([facility]) [J44.1]  COPD exacerbation ([facility]) [J44.1]    PMH:   Past Medical History:   Diagnosis Date    Asthma     CVA (cerebral vascular accident) ([facility])     Drug abuse ([facility])     states, \"I do crack every once in a while\"       Social History:  Social history   Social History     Tobacco Use    Smoking status: Every Day     Current packs/day: 1.00     Types: Cigarettes    Smokeless tobacco: Never   Substance Use Topics    Alcohol use: Yes     Drug History   Social History     Substance and Sexual Activity   Drug Use Yes    Types: Cocaine     Familiy History No family history on file.    Consults: Gastroenterology    Significant Diagnostic Studies:     Discharge Condition: Stable    Disposition: SNF    Discharge Medications:   Current Discharge Medication List        START taking these medications    Details   QUEtiapine (SEROQUEL) 50 MG tablet Take 1 tablet by mouth 2 times daily  Qty: 60 tablet, Refills: 0      lansoprazole (PREVACID SOLUTAB) 30 MG disintegrating tablet Take 1 tablet by mouth every morning (before breakfast)  Qty: 30 tablet, Refills: 1      thiamine mononitrate (THIAMINE) 100 MG tablet Take 1 tablet by mouth daily  Qty: 30 tablet, Refills: 0           CONTINUE these medications which have CHANGED    Details   metoprolol tartrate (LOPRESSOR) 25 MG tablet Take 0.5 tablets by mouth 2 times daily Hold for sbp < 100  Qty: 30 tablet, Refills: 0           CONTINUE these medications which have NOT CHANGED    Details   !! albuterol sulfate HFA (PROVENTIL HFA) 108 (90 Base) MCG/ACT inhaler Inhale 2 puffs into the lungs every 6 hours as needed for  Wheezing  Qty: 18 g, Refills: 3      !! albuterol sulfate HFA (PROVENTIL;VENTOLIN;PROAIR) 108 (90 Base) MCG/ACT inhaler Inhale 2 puffs into the lungs every 4 hours as needed for Wheezing  Qty: 18 g, Refills: 0      albuterol (PROVENTIL) (5 MG/ML) 0.5% nebulizer solution Take 0.5 mLs by nebulization every 6 hours as needed for Wheezing  Qty: 120 each, Refills: 0      budesonide-formoterol (BREYNA) 80-4.5 MCG/ACT AERO Inhale 2 puffs into the lungs in the morning and 2 puffs in the evening.  Qty: 10.3 g, Refills: 0      atorvastatin (LIPITOR) 20 MG tablet Take 1 tablet by mouth daily      Nebulizers (COMPRESSOR/NEBULIZER) MISC 1 Device by Does not apply route once for 1 dose  Qty: 1 each, Refills: 0      iron-vitamin C (VITRON-C)  MG TABS Take 1 tablet by mouth daily      clopidogrel (PLAVIX) 75 MG tablet Take 1 tablet by mouth daily      cetirizine (ZYRTEC) 10 MG tablet Take 1 tablet by mouth daily      ferrous sulfate (FE TABS 325) 325 (65 Fe) MG EC tablet Take 1 tablet by mouth daily (with breakfast)      traZODone (DESYREL) 50 MG tablet Take 1 tablet by mouth nightly      docusate sodium (COLACE) 100 MG capsule Take 1 capsule by mouth 2 times daily as needed for Constipation      sertraline (ZOLOFT) 50 MG tablet Take 1 tablet by mouth daily      folic acid (FOLVITE) 1 MG tablet Take 1 tablet by mouth daily      ASPIRIN 81 PO Take 81 mg by mouth daily       !! - Potential duplicate medications found. Please discuss with provider.        STOP taking these medications       amLODIPine (NORVASC) 2.5 MG tablet Comments:   Reason for Stopping:         hydroCHLOROthiazide (HYDRODIURIL) 25 MG tablet Comments:   Reason for Stopping:               Hard scripts included in packet: no    Isolation/Precautions: No  Isolation Type: NA    Activity: activity as tolerated  Diet: ADULT DIET; Regular  Wound Care: none needed   Lines: no access  Davis: N/A  Last BM (including prior to admit): 06/29/24    Last Vitals: VITALS:  BP